# Patient Record
Sex: FEMALE | Race: WHITE | Employment: PART TIME | URBAN - NONMETROPOLITAN AREA
[De-identification: names, ages, dates, MRNs, and addresses within clinical notes are randomized per-mention and may not be internally consistent; named-entity substitution may affect disease eponyms.]

---

## 2018-02-20 ENCOUNTER — HOSPITAL ENCOUNTER (EMERGENCY)
Age: 28
Discharge: HOME | End: 2018-02-20
Attending: FAMILY MEDICINE
Payer: COMMERCIAL

## 2018-02-20 ENCOUNTER — APPOINTMENT (OUTPATIENT)
Dept: GENERAL RADIOLOGY | Age: 28
End: 2018-02-20
Payer: COMMERCIAL

## 2018-02-20 VITALS
OXYGEN SATURATION: 97 % | HEART RATE: 123 BPM | WEIGHT: 136 LBS | BODY MASS INDEX: 24.87 KG/M2 | SYSTOLIC BLOOD PRESSURE: 123 MMHG | DIASTOLIC BLOOD PRESSURE: 97 MMHG | TEMPERATURE: 99.9 F | RESPIRATION RATE: 16 BRPM

## 2018-02-20 DIAGNOSIS — R63.8 DEHYDRATION SYMPTOMS: ICD-10-CM

## 2018-02-20 DIAGNOSIS — J10.1 INFLUENZA A: Primary | ICD-10-CM

## 2018-02-20 LAB
ABSOLUTE EOS #: 0 K/UL (ref 0–0.4)
ABSOLUTE IMMATURE GRANULOCYTE: ABNORMAL K/UL (ref 0–0.3)
ABSOLUTE LYMPH #: 0.8 K/UL (ref 1–4.8)
ABSOLUTE MONO #: 0.4 K/UL (ref 0–1)
ANION GAP SERPL CALCULATED.3IONS-SCNC: 12 MMOL/L (ref 9–17)
BASOPHILS # BLD: 0 % (ref 0–2)
BASOPHILS ABSOLUTE: 0 K/UL (ref 0–0.2)
BUN BLDV-MCNC: 13 MG/DL (ref 6–20)
BUN/CREAT BLD: 21 (ref 9–20)
CALCIUM SERPL-MCNC: 9.4 MG/DL (ref 8.6–10.4)
CHLORIDE BLD-SCNC: 102 MMOL/L (ref 98–107)
CO2: 22 MMOL/L (ref 20–31)
CREAT SERPL-MCNC: 0.63 MG/DL (ref 0.5–0.9)
DIFFERENTIAL TYPE: ABNORMAL
DIRECT EXAM: ABNORMAL
DIRECT EXAM: NORMAL
EKG ATRIAL RATE: 108 BPM
EKG P AXIS: 48 DEGREES
EKG P-R INTERVAL: 140 MS
EKG Q-T INTERVAL: 320 MS
EKG QRS DURATION: 74 MS
EKG QTC CALCULATION (BAZETT): 428 MS
EKG R AXIS: 35 DEGREES
EKG T AXIS: 27 DEGREES
EKG VENTRICULAR RATE: 108 BPM
EOSINOPHILS RELATIVE PERCENT: 0 % (ref 0–8)
GFR AFRICAN AMERICAN: >60 ML/MIN
GFR NON-AFRICAN AMERICAN: >60 ML/MIN
GFR SERPL CREATININE-BSD FRML MDRD: ABNORMAL ML/MIN/{1.73_M2}
GFR SERPL CREATININE-BSD FRML MDRD: ABNORMAL ML/MIN/{1.73_M2}
GLUCOSE BLD-MCNC: 107 MG/DL (ref 70–99)
HCG QUANTITATIVE: <1 IU/L
HCT VFR BLD CALC: 44.3 % (ref 36–46)
HEMOGLOBIN: 15 G/DL (ref 12–16)
IMMATURE GRANULOCYTES: ABNORMAL %
LYMPHOCYTES # BLD: 13 % (ref 24–44)
Lab: ABNORMAL
Lab: ABNORMAL
Lab: NORMAL
MCH RBC QN AUTO: 30.1 PG (ref 26–34)
MCHC RBC AUTO-ENTMCNC: 33.9 G/DL (ref 31–37)
MCV RBC AUTO: 88.9 FL (ref 80–100)
MONOCYTES # BLD: 6 % (ref 0–12)
NRBC AUTOMATED: ABNORMAL PER 100 WBC
PDW BLD-RTO: 12.6 % (ref 12.1–15.2)
PLATELET # BLD: 210 K/UL (ref 140–450)
PLATELET ESTIMATE: ABNORMAL
PMV BLD AUTO: 8.9 FL (ref 6–12)
POTASSIUM SERPL-SCNC: 3.9 MMOL/L (ref 3.7–5.3)
RBC # BLD: 4.99 M/UL (ref 4–5.2)
RBC # BLD: ABNORMAL 10*6/UL
SEG NEUTROPHILS: 81 % (ref 36–66)
SEGMENTED NEUTROPHILS ABSOLUTE COUNT: 4.9 K/UL (ref 1.8–7.7)
SODIUM BLD-SCNC: 136 MMOL/L (ref 135–144)
SPECIMEN DESCRIPTION: ABNORMAL
SPECIMEN DESCRIPTION: NORMAL
STATUS: ABNORMAL
STATUS: ABNORMAL
STATUS: NORMAL
WBC # BLD: 6.1 K/UL (ref 3.5–11)
WBC # BLD: ABNORMAL 10*3/UL

## 2018-02-20 PROCEDURE — 6360000002 HC RX W HCPCS: Performed by: FAMILY MEDICINE

## 2018-02-20 PROCEDURE — 2580000003 HC RX 258: Performed by: FAMILY MEDICINE

## 2018-02-20 PROCEDURE — 71046 X-RAY EXAM CHEST 2 VIEWS: CPT

## 2018-02-20 PROCEDURE — 85025 COMPLETE CBC W/AUTO DIFF WBC: CPT

## 2018-02-20 PROCEDURE — 36415 COLL VENOUS BLD VENIPUNCTURE: CPT

## 2018-02-20 PROCEDURE — 99284 EMERGENCY DEPT VISIT MOD MDM: CPT

## 2018-02-20 PROCEDURE — 96374 THER/PROPH/DIAG INJ IV PUSH: CPT

## 2018-02-20 PROCEDURE — 84702 CHORIONIC GONADOTROPIN TEST: CPT

## 2018-02-20 PROCEDURE — 93005 ELECTROCARDIOGRAM TRACING: CPT

## 2018-02-20 PROCEDURE — 87804 INFLUENZA ASSAY W/OPTIC: CPT

## 2018-02-20 PROCEDURE — 80048 BASIC METABOLIC PNL TOTAL CA: CPT

## 2018-02-20 PROCEDURE — 87651 STREP A DNA AMP PROBE: CPT

## 2018-02-20 PROCEDURE — 6370000000 HC RX 637 (ALT 250 FOR IP): Performed by: FAMILY MEDICINE

## 2018-02-20 RX ORDER — OSELTAMIVIR PHOSPHATE 75 MG/1
75 CAPSULE ORAL 2 TIMES DAILY
Qty: 10 CAPSULE | Refills: 0 | Status: SHIPPED | OUTPATIENT
Start: 2018-02-20 | End: 2018-02-25

## 2018-02-20 RX ORDER — 0.9 % SODIUM CHLORIDE 0.9 %
1000 INTRAVENOUS SOLUTION INTRAVENOUS ONCE
Status: COMPLETED | OUTPATIENT
Start: 2018-02-20 | End: 2018-02-20

## 2018-02-20 RX ORDER — DEXTROMETHORPHAN HYDROBROMIDE AND PROMETHAZINE HYDROCHLORIDE 15; 6.25 MG/5ML; MG/5ML
5 SYRUP ORAL 4 TIMES DAILY PRN
Qty: 140 ML | Refills: 0 | Status: SHIPPED | OUTPATIENT
Start: 2018-02-20 | End: 2018-02-27

## 2018-02-20 RX ORDER — KETOROLAC TROMETHAMINE 30 MG/ML
30 INJECTION, SOLUTION INTRAMUSCULAR; INTRAVENOUS ONCE
Status: COMPLETED | OUTPATIENT
Start: 2018-02-20 | End: 2018-02-20

## 2018-02-20 RX ORDER — PROMETHAZINE HYDROCHLORIDE AND CODEINE PHOSPHATE 6.25; 1 MG/5ML; MG/5ML
5 SYRUP ORAL ONCE
Status: COMPLETED | OUTPATIENT
Start: 2018-02-20 | End: 2018-02-20

## 2018-02-20 RX ADMIN — KETOROLAC TROMETHAMINE 30 MG: 30 INJECTION, SOLUTION INTRAMUSCULAR at 08:32

## 2018-02-20 RX ADMIN — Medication 5 ML: at 08:23

## 2018-02-20 RX ADMIN — SODIUM CHLORIDE 1000 ML: 9 INJECTION, SOLUTION INTRAVENOUS at 08:31

## 2018-02-20 ASSESSMENT — PAIN SCALES - GENERAL
PAINLEVEL_OUTOF10: 10
PAINLEVEL_OUTOF10: 10

## 2018-02-20 ASSESSMENT — PAIN DESCRIPTION - LOCATION
LOCATION_2: THROAT
LOCATION: HEAD

## 2018-02-20 ASSESSMENT — PAIN DESCRIPTION - INTENSITY: RATING_2: 8

## 2018-02-20 ASSESSMENT — PAIN DESCRIPTION - DESCRIPTORS: DESCRIPTORS: ACHING

## 2018-02-20 ASSESSMENT — PAIN DESCRIPTION - PAIN TYPE: TYPE: ACUTE PAIN

## 2018-02-20 NOTE — ED PROVIDER NOTES
[facility]  eMERGENCY dEPARTMENT eNCOUnter          [facility]       Chief Complaint   Patient presents with    Cough     onset Friday    Pharyngitis     onset Friday    Headache     onset yesterday    Dizziness     onset this am       Nurses Notes reviewed and I agree except as noted in the HPI. HISTORY OF PRESENT ILLNESS    Elena Degroot is a 28 y.o. female who presents To emergency with complaint of cough sore throat headache bodyaches dizziness and fever chills. Patient states cough and sore throat symptoms began 3-4 days prior, headache began day prior, the patient had some dizziness this morning. Patient did not realize she had a fever quantitatively until she arrived emergent, but has had subjective fevers and chills. Patient has had no relief with Motrin 800 Tylenol,  at night all evening prior. 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 Patient rates her overall discomfort a 10 out of 10, aching. REVIEW OF SYSTEMS     Review of Systems   All other systems reviewed and are negative. PAST MEDICAL HISTORY    has a past medical history of Anemia; GERD (gastroesophageal reflux disease); Kidney stones; and Postpartum depression. SURGICAL HISTORY      has a past surgical history that includes External ear surgery; Upper gastrointestinal endoscopy; and other surgical history (Right, 2016). CURRENT MEDICATIONS       Discharge Medication List as of 2018  9:38 AM      CONTINUE these medications which have NOT CHANGED    Details   omeprazole (PRILOSEC) 20 MG capsule Take 20 mg by mouth Daily , R-0             ALLERGIES     has No Known Allergies. FAMILY HISTORY     indicated that her mother is alive. She indicated that her father is alive. She indicated that her sister is alive.  She indicated that her maternal grandmother is . She indicated that her maternal grandfather is alive. family history includes Arthritis in her maternal grandmother; Asthma in her sister; Heart Disease in her maternal grandfather; High Blood Pressure in her maternal grandfather; Stroke in her maternal grandfather; Ulcerative Colitis in her father and sister; Vision Loss in her maternal grandmother. SOCIAL HISTORY      reports that she has never smoked. She has never used smokeless tobacco. She reports that she does not drink alcohol or use drugs. PHYSICAL EXAM     INITIAL VITALS:  weight is 136 lb (61.7 kg). Her tympanic temperature is 99.9 °F (37.7 °C). Her blood pressure is 123/97 (abnormal) and her pulse is 123. Her respiration is 16 and oxygen saturation is 97%. Physical Exam   Constitutional: Patient is oriented to person, place, and time. Patient appears well-developed and well-nourished. Patient is active and cooperative. HENT:   Head: Normocephalic and atraumatic. Head is without contusion. Right Ear: Hearing and external ear normal. No drainage. Left Ear: Hearing and external ear normal. No drainage. Nose: Nose normal. No nasal deformity. No epistaxis. Mouth/Throat: Mucous membranes are  dry. Mild erythema negative exudate or oral lesions  Eyes: EOMI. Conjunctivae, sclera, and lids are normal. Right eye exhibits no discharge. Left eye exhibits no discharge. Neck: Full passive range of motion without pain and phonation normal.   Cardiovascular:  Tachycardia, regular rhythm and intact distal pulses. No edema. Pulses: Right radial pulse  2+   Pulmonary/Chest: Effort normal.  Mild tachypnea. No wheezes, rhonchi, or rales. Abdominal: Soft. Patient without distension or tenderness  Musculoskeletal:   Negative acute trauma or deformity,  apparent full range of motion and normal strength all extremities appropriate to age. Neurological: Patient is alert and oriented to person, place, and time. patient displays no tremor. Patient displays no seizure activity. .  Lymphatic: Mild anterior cervical adenopathy, nontender  Skin: Skin is warm and dry. Patient is not diaphoretic. Psychiatric: Patient has a normal mood and affect. Patient speech is normal and behavior is normal. Cognition and memory are normal.      DIFFERENTIAL DIAGNOSIS:   Influenza strep throat pneumonia bronchitis URI    DIAGNOSTIC RESULTS     EKG: All EKG's are interpreted by the Emergency Department Physician who either signs or Co-signs this chart in the absence of a cardiologist.  EKG    The patient had an EKG which is interpreted by me in the absence of a Cardiologist.   [x] Without comparison to previous. [] With comparison to a previous EKG Dated     EKG @ 0756 hrs - sinus tachycardia rate 108 normal axis normal intervals , no prior for comparison      RADIOLOGY: non-plain film images(s) such as CT, Ultrasound and MRI are read by the radiologist.  XR CHEST STANDARD (2 VW)   Final Result   Normal chest          LABS:   Labs Reviewed   RAPID INFLUENZA A/B ANTIGENS - Abnormal; Notable for the following:        Result Value    Direct Exam   (*)     Value: POSITIVE for Influenza A Antigen NEGATIVE for Influenza B Antigen    All other components within normal limits   CBC WITH AUTO DIFFERENTIAL - Abnormal; Notable for the following:     Seg Neutrophils 81 (*)     Lymphocytes 13 (*)     Absolute Lymph # 0.80 (*)     All other components within normal limits   BASIC METABOLIC PANEL - Abnormal; Notable for the following:     Glucose 107 (*)     Bun/Cre Ratio 21 (*)     All other components within normal limits   STREP A DNA PROBE, AMPLIFICATION - Abnormal; Notable for the following:     Direct Exam   (*)     Value: POSITIVE: Specimen positive for Streptococcus pyogenes by DNA amplification.     All other components within normal limits   STREP SCREEN GROUP A THROAT   HCG, QUANTITATIVE, PREGNANCY       EMERGENCY DEPARTMENT COURSE:   Vitals:    Vitals:    02/20/18 4968

## 2018-09-18 ENCOUNTER — HOSPITAL ENCOUNTER (OUTPATIENT)
Age: 28
Discharge: HOME | End: 2018-09-18
Payer: COMMERCIAL

## 2018-09-18 LAB
ESTRADIOL LEVEL: 47 PG/ML (ref 27–314)
HCG QUANTITATIVE: <1 IU/L
PROGESTERONE LEVEL: 4.9 NG/ML

## 2018-09-18 PROCEDURE — 82670 ASSAY OF TOTAL ESTRADIOL: CPT

## 2018-09-18 PROCEDURE — 84702 CHORIONIC GONADOTROPIN TEST: CPT

## 2018-09-18 PROCEDURE — 84144 ASSAY OF PROGESTERONE: CPT

## 2018-09-18 PROCEDURE — 36415 COLL VENOUS BLD VENIPUNCTURE: CPT

## 2018-09-25 ENCOUNTER — HOSPITAL ENCOUNTER (EMERGENCY)
Age: 28
Discharge: HOME | End: 2018-09-25
Attending: EMERGENCY MEDICINE
Payer: COMMERCIAL

## 2018-09-25 VITALS
DIASTOLIC BLOOD PRESSURE: 88 MMHG | TEMPERATURE: 98.4 F | HEART RATE: 84 BPM | SYSTOLIC BLOOD PRESSURE: 152 MMHG | BODY MASS INDEX: 26.7 KG/M2 | OXYGEN SATURATION: 97 % | WEIGHT: 146 LBS | RESPIRATION RATE: 16 BRPM

## 2018-09-25 DIAGNOSIS — S30.814A ABRASION OF VAGINA, INITIAL ENCOUNTER: Primary | ICD-10-CM

## 2018-09-25 PROCEDURE — 99282 EMERGENCY DEPT VISIT SF MDM: CPT

## 2018-09-25 RX ORDER — ESTRADIOL 0.5 MG/1
2 TABLET ORAL DAILY
Status: ON HOLD | COMMUNITY
End: 2019-06-24

## 2018-09-25 ASSESSMENT — PAIN SCALES - GENERAL: PAINLEVEL_OUTOF10: 9

## 2018-09-25 ASSESSMENT — ENCOUNTER SYMPTOMS
CONSTIPATION: 0
DIARRHEA: 0
SORE THROAT: 0
ABDOMINAL DISTENTION: 0
COUGH: 0
NAUSEA: 0
SHORTNESS OF BREATH: 0
RHINORRHEA: 0
VOMITING: 0
WHEEZING: 0

## 2018-09-25 ASSESSMENT — PAIN DESCRIPTION - PAIN TYPE: TYPE: ACUTE PAIN

## 2018-09-25 ASSESSMENT — PAIN DESCRIPTION - LOCATION: LOCATION: VAGINA

## 2018-09-25 ASSESSMENT — PAIN DESCRIPTION - DESCRIPTORS: DESCRIPTORS: SORE

## 2018-09-25 NOTE — ED PROVIDER NOTES
Pressure Maternal Grandfather     Arthritis Maternal Grandmother     Vision Loss Maternal Grandmother        Allergies:  Patient has no known allergies. Home Medications:  Prior to Admission medications    Medication Sig Start Date End Date Taking? Authorizing Provider   estradiol (ESTRACE) 0.5 MG tablet Take 0.5 mg by mouth daily   Yes Historical Provider, MD   omeprazole (PRILOSEC) 20 MG capsule Take 20 mg by mouth Daily  5/20/16  Yes Historical Provider, MD       REVIEW OF SYSTEMS    (2-9 systems for level 4, 10 or more for level 5)      Review of Systems   Constitutional: Negative for activity change, appetite change, fatigue and fever. HENT: Negative for congestion, rhinorrhea and sore throat. Respiratory: Negative for cough, shortness of breath and wheezing. Cardiovascular: Negative for chest pain, palpitations and leg swelling. Gastrointestinal: Negative for abdominal distention, constipation, diarrhea, nausea and vomiting. Genitourinary: Positive for vaginal pain. Negative for decreased urine volume and dysuria. Skin: Negative for rash. Neurological: Negative for dizziness, weakness, light-headedness, numbness and headaches. PHYSICAL EXAM   (up to 7 for level 4, 8 or more for level 5)      INITIAL VITALS:   BP (!) 152/88   Pulse 84   Temp 98.4 °F (36.9 °C) (Tympanic)   Resp 16   Wt 146 lb (66.2 kg)   LMP 09/21/2018   SpO2 97%   BMI 26.70 kg/m²     Physical Exam   Constitutional: She is oriented to person, place, and time. Nontoxic appearing, answering all questions appropriately no signs of distress   HENT:   Head: Normocephalic and atraumatic. Eyes: Conjunctivae are normal. Right eye exhibits no discharge. Left eye exhibits no discharge. Cardiovascular: Normal rate, regular rhythm and normal heart sounds. Exam reveals no gallop and no friction rub. No murmur heard. Pulmonary/Chest: Effort normal and breath sounds normal. No respiratory distress.  She has no DO  09/25/18 [facility] Francine, DO  09/25/18 1208

## 2018-09-25 NOTE — ED NOTES
Writer in with dr Macey Sheppard during vaginal exam. Pt tolerated well.        Angela Donald RN  09/25/18 7651

## 2018-10-23 ENCOUNTER — HOSPITAL ENCOUNTER (EMERGENCY)
Age: 28
Discharge: HOME | End: 2018-10-23
Attending: EMERGENCY MEDICINE
Payer: COMMERCIAL

## 2018-10-23 VITALS
SYSTOLIC BLOOD PRESSURE: 130 MMHG | HEART RATE: 103 BPM | RESPIRATION RATE: 18 BRPM | TEMPERATURE: 99.5 F | OXYGEN SATURATION: 99 % | DIASTOLIC BLOOD PRESSURE: 90 MMHG

## 2018-10-23 DIAGNOSIS — J06.9 UPPER RESPIRATORY TRACT INFECTION, UNSPECIFIED TYPE: Primary | ICD-10-CM

## 2018-10-23 DIAGNOSIS — J40 BRONCHITIS: ICD-10-CM

## 2018-10-23 PROCEDURE — 99283 EMERGENCY DEPT VISIT LOW MDM: CPT

## 2018-11-07 ENCOUNTER — HOSPITAL ENCOUNTER (OUTPATIENT)
Dept: ULTRASOUND IMAGING | Facility: CLINIC | Age: 28
Discharge: HOME | End: 2018-11-09
Payer: COMMERCIAL

## 2018-11-07 ENCOUNTER — HOSPITAL ENCOUNTER (OUTPATIENT)
Age: 28
Setting detail: SPECIMEN
Discharge: HOME | End: 2018-11-07
Payer: COMMERCIAL

## 2018-11-07 DIAGNOSIS — Z34.90 PREGNANCY, UNSPECIFIED GESTATIONAL AGE: ICD-10-CM

## 2018-11-07 LAB
ESTRADIOL LEVEL: 390 PG/ML (ref 27–314)
HCG QUANTITATIVE: ABNORMAL IU/L
PROGESTERONE LEVEL: 9.56 NG/ML

## 2018-11-07 PROCEDURE — 76817 TRANSVAGINAL US OBSTETRIC: CPT

## 2018-11-14 ENCOUNTER — HOSPITAL ENCOUNTER (OUTPATIENT)
Age: 28
Setting detail: SPECIMEN
Discharge: HOME | End: 2018-11-14
Payer: COMMERCIAL

## 2018-11-14 ENCOUNTER — HOSPITAL ENCOUNTER (OUTPATIENT)
Dept: ULTRASOUND IMAGING | Facility: CLINIC | Age: 28
Discharge: HOME | End: 2018-11-16
Payer: COMMERCIAL

## 2018-11-14 DIAGNOSIS — Z3A.01 LESS THAN 8 WEEKS GESTATION OF PREGNANCY: ICD-10-CM

## 2018-11-14 LAB
ESTRADIOL LEVEL: 637 PG/ML (ref 27–314)
PROGESTERONE LEVEL: 21.9 NG/ML

## 2018-11-14 PROCEDURE — 76817 TRANSVAGINAL US OBSTETRIC: CPT

## 2018-11-25 ENCOUNTER — HOSPITAL ENCOUNTER (EMERGENCY)
Age: 28
Discharge: HOME | End: 2018-11-25
Payer: COMMERCIAL

## 2018-11-25 VITALS
SYSTOLIC BLOOD PRESSURE: 114 MMHG | RESPIRATION RATE: 13 BRPM | TEMPERATURE: 98.4 F | HEART RATE: 80 BPM | DIASTOLIC BLOOD PRESSURE: 80 MMHG | OXYGEN SATURATION: 97 %

## 2018-11-25 DIAGNOSIS — N30.00 ACUTE CYSTITIS WITHOUT HEMATURIA: ICD-10-CM

## 2018-11-25 DIAGNOSIS — O21.9 NAUSEA/VOMITING IN PREGNANCY: ICD-10-CM

## 2018-11-25 DIAGNOSIS — K21.9 GASTROESOPHAGEAL REFLUX DISEASE, ESOPHAGITIS PRESENCE NOT SPECIFIED: Primary | ICD-10-CM

## 2018-11-25 LAB
-: ABNORMAL
ABSOLUTE EOS #: 0.13 K/UL (ref 0–0.44)
ABSOLUTE IMMATURE GRANULOCYTE: 0.03 K/UL (ref 0–0.3)
ABSOLUTE LYMPH #: 2.57 K/UL (ref 1.1–3.7)
ABSOLUTE MONO #: 0.58 K/UL (ref 0.1–1.2)
ALBUMIN SERPL-MCNC: 3.7 G/DL (ref 3.5–5.2)
ALBUMIN/GLOBULIN RATIO: 1.2 (ref 1–2.5)
ALP BLD-CCNC: 73 U/L (ref 35–104)
ALT SERPL-CCNC: 12 U/L (ref 5–33)
AMORPHOUS: ABNORMAL
ANION GAP SERPL CALCULATED.3IONS-SCNC: 11 MMOL/L (ref 9–17)
AST SERPL-CCNC: 15 U/L
BACTERIA: ABNORMAL
BASOPHILS # BLD: 1 % (ref 0–2)
BASOPHILS ABSOLUTE: 0.06 K/UL (ref 0–0.2)
BILIRUB SERPL-MCNC: 0.22 MG/DL (ref 0.3–1.2)
BILIRUBIN URINE: NEGATIVE
BUN BLDV-MCNC: 8 MG/DL (ref 6–20)
BUN/CREAT BLD: 15 (ref 9–20)
CALCIUM SERPL-MCNC: 9 MG/DL (ref 8.6–10.4)
CASTS UA: ABNORMAL /LPF
CHLORIDE BLD-SCNC: 103 MMOL/L (ref 98–107)
CO2: 24 MMOL/L (ref 20–31)
COLOR: YELLOW
COMMENT UA: ABNORMAL
CREAT SERPL-MCNC: 0.54 MG/DL (ref 0.5–0.9)
CRYSTALS, UA: ABNORMAL /HPF
DIFFERENTIAL TYPE: ABNORMAL
EOSINOPHILS RELATIVE PERCENT: 1 % (ref 1–4)
EPITHELIAL CELLS UA: ABNORMAL /HPF (ref 0–25)
GFR AFRICAN AMERICAN: >60 ML/MIN
GFR NON-AFRICAN AMERICAN: >60 ML/MIN
GFR SERPL CREATININE-BSD FRML MDRD: ABNORMAL ML/MIN/{1.73_M2}
GFR SERPL CREATININE-BSD FRML MDRD: ABNORMAL ML/MIN/{1.73_M2}
GLUCOSE BLD-MCNC: 86 MG/DL (ref 70–99)
GLUCOSE URINE: NEGATIVE
HCT VFR BLD CALC: 41.7 % (ref 36.3–47.1)
HEMOGLOBIN: 14.3 G/DL (ref 11.9–15.1)
IMMATURE GRANULOCYTES: 0 %
KETONES, URINE: NEGATIVE
LEUKOCYTE ESTERASE, URINE: ABNORMAL
LIPASE: 22 U/L (ref 13–60)
LYMPHOCYTES # BLD: 26 % (ref 24–43)
MCH RBC QN AUTO: 31.6 PG (ref 25.2–33.5)
MCHC RBC AUTO-ENTMCNC: 34.3 G/DL (ref 28.4–34.8)
MCV RBC AUTO: 92.1 FL (ref 82.6–102.9)
MONOCYTES # BLD: 6 % (ref 3–12)
MUCUS: ABNORMAL
NITRITE, URINE: NEGATIVE
NRBC AUTOMATED: 0 PER 100 WBC
OTHER OBSERVATIONS UA: ABNORMAL
PDW BLD-RTO: 11.5 % (ref 11.8–14.4)
PH UA: 6 (ref 5–9)
PLATELET # BLD: 241 K/UL (ref 138–453)
PLATELET ESTIMATE: ABNORMAL
PMV BLD AUTO: 10.2 FL (ref 8.1–13.5)
POTASSIUM SERPL-SCNC: 4 MMOL/L (ref 3.7–5.3)
PROTEIN UA: NEGATIVE
RBC # BLD: 4.53 M/UL (ref 3.95–5.11)
RBC # BLD: ABNORMAL 10*6/UL
RBC UA: ABNORMAL /HPF (ref 0–2)
RENAL EPITHELIAL, UA: ABNORMAL /HPF
SEG NEUTROPHILS: 66 % (ref 36–65)
SEGMENTED NEUTROPHILS ABSOLUTE COUNT: 6.45 K/UL (ref 1.5–8.1)
SODIUM BLD-SCNC: 138 MMOL/L (ref 135–144)
SPECIFIC GRAVITY UA: 1.02 (ref 1.01–1.02)
TOTAL PROTEIN: 6.8 G/DL (ref 6.4–8.3)
TRICHOMONAS: ABNORMAL
TURBIDITY: CLEAR
URINE HGB: NEGATIVE
UROBILINOGEN, URINE: NORMAL
WBC # BLD: 9.8 K/UL (ref 3.5–11.3)
WBC # BLD: ABNORMAL 10*3/UL
WBC UA: ABNORMAL /HPF (ref 0–5)
YEAST: ABNORMAL

## 2018-11-25 PROCEDURE — 85025 COMPLETE CBC W/AUTO DIFF WBC: CPT

## 2018-11-25 PROCEDURE — 96375 TX/PRO/DX INJ NEW DRUG ADDON: CPT

## 2018-11-25 PROCEDURE — 6360000002 HC RX W HCPCS: Performed by: PHYSICIAN ASSISTANT

## 2018-11-25 PROCEDURE — 2580000003 HC RX 258: Performed by: PHYSICIAN ASSISTANT

## 2018-11-25 PROCEDURE — 81001 URINALYSIS AUTO W/SCOPE: CPT

## 2018-11-25 PROCEDURE — 96365 THER/PROPH/DIAG IV INF INIT: CPT

## 2018-11-25 PROCEDURE — 36415 COLL VENOUS BLD VENIPUNCTURE: CPT

## 2018-11-25 PROCEDURE — 87086 URINE CULTURE/COLONY COUNT: CPT

## 2018-11-25 PROCEDURE — 80053 COMPREHEN METABOLIC PANEL: CPT

## 2018-11-25 PROCEDURE — 99284 EMERGENCY DEPT VISIT MOD MDM: CPT

## 2018-11-25 PROCEDURE — 83690 ASSAY OF LIPASE: CPT

## 2018-11-25 RX ORDER — METOCLOPRAMIDE HYDROCHLORIDE 5 MG/ML
10 INJECTION INTRAMUSCULAR; INTRAVENOUS ONCE
Status: DISCONTINUED | OUTPATIENT
Start: 2018-11-25 | End: 2018-11-25

## 2018-11-25 RX ORDER — CEPHALEXIN 500 MG/1
500 CAPSULE ORAL 4 TIMES DAILY
Qty: 28 CAPSULE | Refills: 0 | Status: SHIPPED | OUTPATIENT
Start: 2018-11-25 | End: 2018-12-02

## 2018-11-25 RX ORDER — METOCLOPRAMIDE HYDROCHLORIDE 5 MG/ML
10 INJECTION INTRAMUSCULAR; INTRAVENOUS ONCE
Status: COMPLETED | OUTPATIENT
Start: 2018-11-25 | End: 2018-11-25

## 2018-11-25 RX ORDER — 0.9 % SODIUM CHLORIDE 0.9 %
1000 INTRAVENOUS SOLUTION INTRAVENOUS ONCE
Status: DISCONTINUED | OUTPATIENT
Start: 2018-11-25 | End: 2018-11-25

## 2018-11-25 RX ORDER — OMEPRAZOLE 20 MG/1
20 CAPSULE, DELAYED RELEASE ORAL DAILY
Qty: 30 CAPSULE | Refills: 0 | Status: SHIPPED | OUTPATIENT
Start: 2018-11-25 | End: 2020-09-30 | Stop reason: ALTCHOICE

## 2018-11-25 RX ORDER — DOXYLAMINE SUCCINATE AND PYRIDOXINE HYDROCHLORIDE, DELAYED RELEASE TABLETS 10 MG/10 MG 10; 10 MG/1; MG/1
TABLET, DELAYED RELEASE ORAL
Qty: 20 TABLET | Refills: 0 | Status: ON HOLD | OUTPATIENT
Start: 2018-11-25 | End: 2019-06-16

## 2018-11-25 RX ADMIN — SODIUM CHLORIDE 999 ML: 9 INJECTION, SOLUTION INTRAVENOUS at 17:09

## 2018-11-25 RX ADMIN — METOCLOPRAMIDE 10 MG: 5 INJECTION, SOLUTION INTRAMUSCULAR; INTRAVENOUS at 17:16

## 2018-11-25 ASSESSMENT — ENCOUNTER SYMPTOMS
VOMITING: 1
SORE THROAT: 1
ABDOMINAL PAIN: 0
NAUSEA: 1

## 2018-11-25 ASSESSMENT — PAIN DESCRIPTION - PAIN TYPE: TYPE: ACUTE PAIN

## 2018-11-25 ASSESSMENT — PAIN SCALES - GENERAL: PAINLEVEL_OUTOF10: 3

## 2018-11-25 ASSESSMENT — PAIN DESCRIPTION - LOCATION: LOCATION: THROAT

## 2018-11-26 LAB
CULTURE: NORMAL
Lab: NORMAL
SPECIMEN DESCRIPTION: NORMAL
STATUS: NORMAL

## 2018-11-29 ENCOUNTER — APPOINTMENT (OUTPATIENT)
Dept: GENERAL RADIOLOGY | Age: 28
End: 2018-11-29
Payer: COMMERCIAL

## 2018-11-29 ENCOUNTER — HOSPITAL ENCOUNTER (EMERGENCY)
Age: 28
Discharge: HOME | End: 2018-11-29
Attending: EMERGENCY MEDICINE
Payer: COMMERCIAL

## 2018-11-29 VITALS
OXYGEN SATURATION: 99 % | RESPIRATION RATE: 18 BRPM | HEIGHT: 63 IN | BODY MASS INDEX: 25.87 KG/M2 | TEMPERATURE: 98.6 F | DIASTOLIC BLOOD PRESSURE: 106 MMHG | WEIGHT: 146 LBS | SYSTOLIC BLOOD PRESSURE: 135 MMHG | HEART RATE: 83 BPM

## 2018-11-29 DIAGNOSIS — J02.9 SORE THROAT: Primary | ICD-10-CM

## 2018-11-29 DIAGNOSIS — K21.00 GASTROESOPHAGEAL REFLUX DISEASE WITH ESOPHAGITIS: ICD-10-CM

## 2018-11-29 PROCEDURE — 99283 EMERGENCY DEPT VISIT LOW MDM: CPT

## 2018-11-29 PROCEDURE — 6370000000 HC RX 637 (ALT 250 FOR IP): Performed by: EMERGENCY MEDICINE

## 2018-11-29 PROCEDURE — 70360 X-RAY EXAM OF NECK: CPT

## 2018-11-29 RX ORDER — MAGNESIUM HYDROXIDE/ALUMINUM HYDROXICE/SIMETHICONE 120; 1200; 1200 MG/30ML; MG/30ML; MG/30ML
30 SUSPENSION ORAL ONCE
Status: COMPLETED | OUTPATIENT
Start: 2018-11-29 | End: 2018-11-29

## 2018-11-29 RX ORDER — PROMETHAZINE HYDROCHLORIDE 12.5 MG/1
12.5 TABLET ORAL 3 TIMES DAILY PRN
Qty: 12 TABLET | Refills: 0 | Status: SHIPPED | OUTPATIENT
Start: 2018-11-29 | End: 2018-12-06

## 2018-11-29 RX ADMIN — ALUMINUM HYDROXIDE, MAGNESIUM HYDROXIDE, AND SIMETHICONE 30 ML: 200; 200; 20 SUSPENSION ORAL at 18:21

## 2018-11-29 NOTE — ED NOTES
Patient requesting xray of neck to make sure there isn't something in her throat     Yancy Joaquin RN  11/29/18 6814

## 2018-11-29 NOTE — ED PROVIDER NOTES
History:   Procedure Laterality Date    EXTERNAL EAR SURGERY      OTHER SURGICAL HISTORY Right 5/26/2016    right cysto HLL with stent exchange    UPPER GASTROINTESTINAL ENDOSCOPY         CURRENT MEDICATIONS       Previous Medications    CEPHALEXIN (KEFLEX) 500 MG CAPSULE    Take 1 capsule by mouth 4 times daily for 7 days    DOXYLAMINE-PYRIDOXINE 10-10 MG TBEC    Take 2 tablets PO at bedtime for Nausea    ESTRADIOL (ESTRACE) 0.5 MG TABLET    Take 2 mg by mouth daily     OMEPRAZOLE (PRILOSEC) 20 MG DELAYED RELEASE CAPSULE    Take 1 capsule by mouth daily    PROGESTERONE (FIRST-PROGESTERONE ) 200 MG SUPP    Place 200 mg vaginally daily    PROGESTERONE (PROMETRIUM) 200 MG CAPSULE    Take 200 mg by mouth daily       ALLERGIES     Patient has no known allergies.     FAMILY HISTORY       Family History   Problem Relation Age of Onset    Ulcerative Colitis Father     Ulcerative Colitis Sister     Asthma Sister     Heart Disease Maternal Grandfather     Stroke Maternal Grandfather     High Blood Pressure Maternal Grandfather     Arthritis Maternal Grandmother     Vision Loss Maternal Grandmother         SOCIAL HISTORY       Social History     Social History    Marital status: Single     Spouse name: N/A    Number of children: N/A    Years of education: N/A     Social History Main Topics    Smoking status: Never Smoker    Smokeless tobacco: Never Used    Alcohol use No    Drug use: No    Sexual activity: Yes     Partners: Male     Other Topics Concern    None     Social History Narrative    None       SCREENINGS           PHYSICAL EXAM    (up to 7 for level 4, 8 or more for level 5)     ED Triage Vitals [11/29/18 1726]   BP Temp Temp Source Pulse Resp SpO2 Height Weight   (!) 135/106 98.6 °F (37 °C) Tympanic 83 18 99 % 5' 3\" (1.6 m) 146 lb (66.2 kg)     ED Triage Vitals [11/29/18 1726]   Enc Vitals Group      BP (!) 135/106      Pulse 83      Resp 18      Temp 98.6 °F (37 °C)      Temp Source that she is eating and drinking and doing fine  [BD]   1834 Requested radiology to shield the abdomen    [BD]      ED Course User Index  [BD] Syanns El, DO     Medications   aluminum & magnesium hydroxide-simethicone (MAALOX) 425-188-17 MG/5ML suspension 30 mL (30 mLs Oral Given 11/29/18 1821)       MDM. Patient was counseled to follow up with primary care doctor in one to two days or return to emergency Department if patient has new or worsening symptoms or other concerns. I was able to address the patient's questions, pain and other concerns to their satisfaction. The patient and/or family   -had the results of all tests and diagnosis explained to them   -were given both verbal and written discharge instructions   -were instructed of the importance of close follow-up   -were told that an ED diagnosis is often a preliminary diagnosis   -that definitive care is often not able to be given in the ED   -were told that close follow-up is essential for good health and good outcomes         REVAL:     As suspected x-ray negative unremarkable visit no findings of medical emergency advised close follow-up    Patient states that she is already on Prilosec    I advised using Maalox with it    I estimate there is LOW risk for (including but not limited to) ACUTE CORONARY SYNDROME, PULMONARY EMBOLISM, PNEUMOTHORAX, RUPTURED ESOPHAGUS OR THORACIC AORTIC DISSECTION, thus I consider the discharge disposition reasonable. Emeterio Dave (or their surrogate) and I have discussed the diagnosis and risks, and we agree with discharging home with close follow-up. We also discussed returning to the Emergency Department immediately if new or worsening symptoms occur. We have discussed the symptoms which are most concerning that necessitate immediate return.         I estimate there is LOW risk for (including but not limited to) NON-ACCIDENTAL TRAUMA, UNSTABLE CERVICAL FRACTURE, ACUTE CORONARY SYNDROME, AORTIC DISSECTION, CENTRAL CORD SYNDROME, / ANTERIOR/ POSTERIOR CORD SYNDROME, EPIDURAL MASS LESION or BACTERIAL MENINGITIS thus I consider the discharge disposition reasonable. Ania Show (or their surrogate) and I have discussed the diagnosis and risks, and we agree with discharging home with close follow-up. We also discussed returning to the Emergency Department immediately if new or worsening symptoms occur. We have discussed the symptoms which are most concerning that necessitate immediate return. CRITICAL CARE TIME       CONSULTS:  None    PROCEDURES:  Unless otherwise noted below, none     Procedures    ATTESTATIONS  Vital signs and nursing notes reviewed. If included as part of this work-up, I interpreted the ECG andreviewed all diagnostic imaging as well as provided preliminary interpretation of plain film imaging as noted. As warranted and when indicated,  I reviewed the PDMP as  applicable. FINAL IMPRESSION      1. Sore throat    2. Gastroesophageal reflux disease with esophagitis          DISPOSITION/PLAN   DISPOSITION        PATIENT REFERREDTO:  [place] [address]  [phone]  Schedule an appointment as soon as possible for a visit in 1 day  For reevaluation of today's complaint      DISCHARGE MEDICATIONS:  New Prescriptions    No medications on file                Summation      Patient Course: See HPI and MDM       ED Medications administered this visit:    Medications   aluminum & magnesium hydroxide-simethicone (MAALOX) 200-200-20 MG/5ML suspension 30 mL (30 mLs Oral Given 11/29/18 1821)       New Prescriptions from this visit:  New Prescriptions    No medications on file       Follow-up:  [address] [facility]  Schedule an appointment as soon as possible for a visit in 1 day  For reevaluation of today's complaint        Final Impression:   1. Sore throat    2.  Gastroesophageal

## 2018-11-29 NOTE — ED NOTES
Patient states that she is pregnant and has been vomiting a lot, states that she has something stuck in her throat. Says she is unable to eat or drink anything that she will vomit it right back up.      Soniya Ledezma RN  11/29/18 1941

## 2018-12-03 ENCOUNTER — HOSPITAL ENCOUNTER (OUTPATIENT)
Age: 28
Setting detail: SPECIMEN
Discharge: HOME | End: 2018-12-03
Payer: COMMERCIAL

## 2018-12-03 ENCOUNTER — HOSPITAL ENCOUNTER (OUTPATIENT)
Dept: ULTRASOUND IMAGING | Facility: CLINIC | Age: 28
Discharge: HOME | End: 2018-12-05
Payer: COMMERCIAL

## 2018-12-03 DIAGNOSIS — Z34.90 PREGNANCY, UNSPECIFIED GESTATIONAL AGE: ICD-10-CM

## 2018-12-03 LAB
ESTRADIOL LEVEL: 1912 PG/ML (ref 27–314)
PROGESTERONE LEVEL: 21.33 NG/ML

## 2018-12-03 PROCEDURE — 76801 OB US < 14 WKS SINGLE FETUS: CPT

## 2018-12-03 PROCEDURE — 76817 TRANSVAGINAL US OBSTETRIC: CPT

## 2018-12-14 ENCOUNTER — HOSPITAL ENCOUNTER (OUTPATIENT)
Dept: ULTRASOUND IMAGING | Age: 28
Discharge: HOME | End: 2018-12-16
Payer: COMMERCIAL

## 2018-12-14 DIAGNOSIS — O20.9 VAGINAL BLEEDING AFFECTING EARLY PREGNANCY: ICD-10-CM

## 2018-12-14 PROCEDURE — 76817 TRANSVAGINAL US OBSTETRIC: CPT

## 2018-12-26 ENCOUNTER — HOSPITAL ENCOUNTER (OUTPATIENT)
Age: 28
Discharge: HOME | End: 2018-12-26
Payer: COMMERCIAL

## 2018-12-26 LAB
ABO/RH: NORMAL
ABSOLUTE EOS #: 0.04 K/UL (ref 0–0.44)
ABSOLUTE IMMATURE GRANULOCYTE: <0.03 K/UL (ref 0–0.3)
ABSOLUTE LYMPH #: 2 K/UL (ref 1.1–3.7)
ABSOLUTE MONO #: 0.39 K/UL (ref 0.1–1.2)
ANTIBODY SCREEN: NEGATIVE
BASOPHILS # BLD: 0 % (ref 0–2)
BASOPHILS ABSOLUTE: <0.03 K/UL (ref 0–0.2)
DIFFERENTIAL TYPE: ABNORMAL
EOSINOPHILS RELATIVE PERCENT: 1 % (ref 1–4)
HCT VFR BLD CALC: 41.5 % (ref 36.3–47.1)
HEMOGLOBIN: 13.7 G/DL (ref 11.9–15.1)
HEPATITIS B SURFACE ANTIGEN: NONREACTIVE
IMMATURE GRANULOCYTES: 0 %
LYMPHOCYTES # BLD: 28 % (ref 24–43)
MCH RBC QN AUTO: 30.5 PG (ref 25.2–33.5)
MCHC RBC AUTO-ENTMCNC: 33 G/DL (ref 28.4–34.8)
MCV RBC AUTO: 92.4 FL (ref 82.6–102.9)
MONOCYTES # BLD: 5 % (ref 3–12)
NRBC AUTOMATED: 0 PER 100 WBC
PDW BLD-RTO: 11.8 % (ref 11.8–14.4)
PLATELET # BLD: 237 K/UL (ref 138–453)
PLATELET ESTIMATE: ABNORMAL
PMV BLD AUTO: 10.4 FL (ref 8.1–13.5)
RBC # BLD: 4.49 M/UL (ref 3.95–5.11)
RBC # BLD: ABNORMAL 10*6/UL
RUBV IGG SER QL: 108.8 IU/ML
SEG NEUTROPHILS: 66 % (ref 36–65)
SEGMENTED NEUTROPHILS ABSOLUTE COUNT: 4.74 K/UL (ref 1.5–8.1)
T. PALLIDUM, IGG: NONREACTIVE
THYROXINE, FREE: 1.27 NG/DL (ref 0.93–1.7)
TSH SERPL DL<=0.05 MIU/L-ACNC: 0.02 MIU/L (ref 0.3–5)
WBC # BLD: 7.2 K/UL (ref 3.5–11.3)
WBC # BLD: ABNORMAL 10*3/UL

## 2018-12-26 PROCEDURE — 84443 ASSAY THYROID STIM HORMONE: CPT

## 2018-12-26 PROCEDURE — 85025 COMPLETE CBC W/AUTO DIFF WBC: CPT

## 2018-12-26 PROCEDURE — 86900 BLOOD TYPING SEROLOGIC ABO: CPT

## 2018-12-26 PROCEDURE — 84439 ASSAY OF FREE THYROXINE: CPT

## 2018-12-26 PROCEDURE — 87340 HEPATITIS B SURFACE AG IA: CPT

## 2018-12-26 PROCEDURE — 86850 RBC ANTIBODY SCREEN: CPT

## 2018-12-26 PROCEDURE — 36415 COLL VENOUS BLD VENIPUNCTURE: CPT

## 2018-12-26 PROCEDURE — 86780 TREPONEMA PALLIDUM: CPT

## 2018-12-26 PROCEDURE — 86901 BLOOD TYPING SEROLOGIC RH(D): CPT

## 2018-12-26 PROCEDURE — 86762 RUBELLA ANTIBODY: CPT

## 2019-01-11 LAB
ABO, EXTERNAL RESULT: NORMAL
C. TRACHOMATIS, EXTERNAL RESULT: NORMAL
HEP B, EXTERNAL RESULT: NORMAL
HIV, EXTERNAL RESULT: NORMAL
RHOGAM, EXTERNAL RESULT: NORMAL
RPR, EXTERNAL RESULT: NORMAL
RUBELLA TITER, EXTERNAL RESULT: NORMAL

## 2019-02-14 ENCOUNTER — HOSPITAL ENCOUNTER (OUTPATIENT)
Age: 29
Discharge: HOME | End: 2019-02-14
Attending: OBSTETRICS & GYNECOLOGY | Admitting: OBSTETRICS & GYNECOLOGY
Payer: COMMERCIAL

## 2019-02-14 ENCOUNTER — APPOINTMENT (OUTPATIENT)
Dept: ULTRASOUND IMAGING | Age: 29
End: 2019-02-14
Payer: COMMERCIAL

## 2019-02-14 LAB
-: ABNORMAL
ABSOLUTE EOS #: 0.06 K/UL (ref 0–0.44)
ABSOLUTE IMMATURE GRANULOCYTE: 0.06 K/UL (ref 0–0.3)
ABSOLUTE LYMPH #: 2.26 K/UL (ref 1.1–3.7)
ABSOLUTE MONO #: 0.63 K/UL (ref 0.1–1.2)
AMORPHOUS: ABNORMAL
BACTERIA: ABNORMAL
BASOPHILS # BLD: 0 % (ref 0–2)
BASOPHILS ABSOLUTE: <0.03 K/UL (ref 0–0.2)
BILIRUBIN URINE: NEGATIVE
CASTS UA: ABNORMAL /LPF
COLOR: YELLOW
COMMENT UA: ABNORMAL
CRYSTALS, UA: ABNORMAL /HPF
DIFFERENTIAL TYPE: ABNORMAL
EOSINOPHILS RELATIVE PERCENT: 1 % (ref 1–4)
EPITHELIAL CELLS UA: ABNORMAL /HPF (ref 0–25)
GLUCOSE URINE: NEGATIVE
HCT VFR BLD CALC: 35.8 % (ref 36.3–47.1)
HEMOGLOBIN: 12.1 G/DL (ref 11.9–15.1)
IMMATURE GRANULOCYTES: 1 %
KETONES, URINE: NEGATIVE
LEUKOCYTE ESTERASE, URINE: ABNORMAL
LYMPHOCYTES # BLD: 22 % (ref 24–43)
MCH RBC QN AUTO: 31.2 PG (ref 25.2–33.5)
MCHC RBC AUTO-ENTMCNC: 33.8 G/DL (ref 28.4–34.8)
MCV RBC AUTO: 92.3 FL (ref 82.6–102.9)
MONOCYTES # BLD: 6 % (ref 3–12)
MUCUS: ABNORMAL
NITRITE, URINE: NEGATIVE
NRBC AUTOMATED: 0 PER 100 WBC
OTHER OBSERVATIONS UA: ABNORMAL
PDW BLD-RTO: 13.2 % (ref 11.8–14.4)
PH UA: 7.5 (ref 5–9)
PLATELET # BLD: 207 K/UL (ref 138–453)
PLATELET ESTIMATE: ABNORMAL
PMV BLD AUTO: 10.1 FL (ref 8.1–13.5)
PROTEIN UA: NEGATIVE
RBC # BLD: 3.88 M/UL (ref 3.95–5.11)
RBC # BLD: ABNORMAL 10*6/UL
RBC UA: ABNORMAL /HPF (ref 0–2)
RENAL EPITHELIAL, UA: ABNORMAL /HPF
SEG NEUTROPHILS: 70 % (ref 36–65)
SEGMENTED NEUTROPHILS ABSOLUTE COUNT: 7.37 K/UL (ref 1.5–8.1)
SPECIFIC GRAVITY UA: 1.01 (ref 1.01–1.02)
TRICHOMONAS: ABNORMAL
TURBIDITY: ABNORMAL
URINE HGB: NEGATIVE
UROBILINOGEN, URINE: NORMAL
WBC # BLD: 10.4 K/UL (ref 3.5–11.3)
WBC # BLD: ABNORMAL 10*3/UL
WBC UA: ABNORMAL /HPF (ref 0–5)
YEAST: ABNORMAL

## 2019-02-14 PROCEDURE — 85025 COMPLETE CBC W/AUTO DIFF WBC: CPT

## 2019-02-14 PROCEDURE — 36415 COLL VENOUS BLD VENIPUNCTURE: CPT

## 2019-02-14 PROCEDURE — 81001 URINALYSIS AUTO W/SCOPE: CPT

## 2019-02-14 PROCEDURE — 76817 TRANSVAGINAL US OBSTETRIC: CPT

## 2019-02-14 PROCEDURE — 99212 OFFICE O/P EST SF 10 MIN: CPT

## 2019-02-25 ENCOUNTER — HOSPITAL ENCOUNTER (OUTPATIENT)
Dept: ULTRASOUND IMAGING | Age: 29
Discharge: HOME | End: 2019-02-27
Payer: COMMERCIAL

## 2019-02-25 DIAGNOSIS — Z36.3 ENCOUNTER FOR ANTENATAL SCREENING FOR MALFORMATION USING ULTRASOUND: ICD-10-CM

## 2019-02-25 PROCEDURE — 76805 OB US >/= 14 WKS SNGL FETUS: CPT

## 2019-03-29 ENCOUNTER — HOSPITAL ENCOUNTER (OUTPATIENT)
Dept: ULTRASOUND IMAGING | Age: 29
Discharge: HOME | End: 2019-03-31
Payer: COMMERCIAL

## 2019-03-29 DIAGNOSIS — O36.63X0 EXCESSIVE FETAL GROWTH AFFECTING MANAGEMENT OF PREGNANCY IN THIRD TRIMESTER, SINGLE OR UNSPECIFIED FETUS: ICD-10-CM

## 2019-03-29 PROCEDURE — 76805 OB US >/= 14 WKS SNGL FETUS: CPT

## 2019-04-19 ENCOUNTER — HOSPITAL ENCOUNTER (OUTPATIENT)
Dept: ULTRASOUND IMAGING | Age: 29
Discharge: HOME | End: 2019-04-21
Payer: COMMERCIAL

## 2019-04-19 DIAGNOSIS — O40.3XX0 POLYHYDRAMNIOS IN THIRD TRIMESTER COMPLICATION, SINGLE OR UNSPECIFIED FETUS: ICD-10-CM

## 2019-04-19 PROCEDURE — 76805 OB US >/= 14 WKS SNGL FETUS: CPT

## 2019-05-25 ENCOUNTER — HOSPITAL ENCOUNTER (OUTPATIENT)
Age: 29
Discharge: HOME | End: 2019-05-26
Attending: OBSTETRICS & GYNECOLOGY | Admitting: OBSTETRICS & GYNECOLOGY
Payer: COMMERCIAL

## 2019-05-25 PROBLEM — O47.00 PRETERM CONTRACTIONS: Status: ACTIVE | Noted: 2019-05-25

## 2019-05-25 LAB
-: ABNORMAL
ABO/RH: NORMAL
ABSOLUTE EOS #: 0.04 K/UL (ref 0–0.44)
ABSOLUTE IMMATURE GRANULOCYTE: 0.21 K/UL (ref 0–0.3)
ABSOLUTE LYMPH #: 1.99 K/UL (ref 1.1–3.7)
ABSOLUTE MONO #: 0.9 K/UL (ref 0.1–1.2)
AMORPHOUS: ABNORMAL
ANTIBODY SCREEN: NEGATIVE
ARM BAND NUMBER: NORMAL
BACTERIA: ABNORMAL
BASOPHILS # BLD: 0 % (ref 0–2)
BASOPHILS ABSOLUTE: 0.03 K/UL (ref 0–0.2)
BILIRUBIN URINE: NEGATIVE
CASTS UA: ABNORMAL /LPF
COLOR: YELLOW
COMMENT UA: ABNORMAL
CRYSTALS, UA: ABNORMAL /HPF
DIFFERENTIAL TYPE: ABNORMAL
EOSINOPHILS RELATIVE PERCENT: 0 % (ref 1–4)
EPITHELIAL CELLS UA: ABNORMAL /HPF (ref 0–25)
EXPIRATION DATE: NORMAL
GLUCOSE URINE: NEGATIVE
HCT VFR BLD CALC: 33.2 % (ref 36.3–47.1)
HEMOGLOBIN: 10.2 G/DL (ref 11.9–15.1)
IMMATURE GRANULOCYTES: 2 %
KETONES, URINE: ABNORMAL
LEUKOCYTE ESTERASE, URINE: ABNORMAL
LYMPHOCYTES # BLD: 16 % (ref 24–43)
MCH RBC QN AUTO: 27.1 PG (ref 25.2–33.5)
MCHC RBC AUTO-ENTMCNC: 30.7 G/DL (ref 28.4–34.8)
MCV RBC AUTO: 88.1 FL (ref 82.6–102.9)
MONOCYTES # BLD: 7 % (ref 3–12)
MUCUS: ABNORMAL
NITRITE, URINE: NEGATIVE
NRBC AUTOMATED: 0.2 PER 100 WBC
OTHER OBSERVATIONS UA: ABNORMAL
PDW BLD-RTO: 14.3 % (ref 11.8–14.4)
PH UA: 7 (ref 5–9)
PLATELET # BLD: 173 K/UL (ref 138–453)
PLATELET ESTIMATE: ABNORMAL
PMV BLD AUTO: 11.2 FL (ref 8.1–13.5)
PROTEIN UA: NEGATIVE
RBC # BLD: 3.77 M/UL (ref 3.95–5.11)
RBC # BLD: ABNORMAL 10*6/UL
RBC UA: ABNORMAL /HPF (ref 0–2)
RENAL EPITHELIAL, UA: ABNORMAL /HPF
SEG NEUTROPHILS: 75 % (ref 36–65)
SEGMENTED NEUTROPHILS ABSOLUTE COUNT: 9.52 K/UL (ref 1.5–8.1)
SPECIFIC GRAVITY UA: 1.01 (ref 1.01–1.02)
TRICHOMONAS: ABNORMAL
TURBIDITY: CLEAR
URINE HGB: NEGATIVE
UROBILINOGEN, URINE: NORMAL
WBC # BLD: 12.7 K/UL (ref 3.5–11.3)
WBC # BLD: ABNORMAL 10*3/UL
WBC UA: ABNORMAL /HPF (ref 0–5)
YEAST: ABNORMAL

## 2019-05-25 PROCEDURE — 86850 RBC ANTIBODY SCREEN: CPT

## 2019-05-25 PROCEDURE — 81001 URINALYSIS AUTO W/SCOPE: CPT

## 2019-05-25 PROCEDURE — 86900 BLOOD TYPING SEROLOGIC ABO: CPT

## 2019-05-25 PROCEDURE — 96372 THER/PROPH/DIAG INJ SC/IM: CPT

## 2019-05-25 PROCEDURE — 2580000003 HC RX 258: Performed by: OBSTETRICS & GYNECOLOGY

## 2019-05-25 PROCEDURE — 6370000000 HC RX 637 (ALT 250 FOR IP): Performed by: OBSTETRICS & GYNECOLOGY

## 2019-05-25 PROCEDURE — 85025 COMPLETE CBC W/AUTO DIFF WBC: CPT

## 2019-05-25 PROCEDURE — 6360000002 HC RX W HCPCS: Performed by: OBSTETRICS & GYNECOLOGY

## 2019-05-25 PROCEDURE — 86901 BLOOD TYPING SEROLOGIC RH(D): CPT

## 2019-05-25 RX ORDER — BETAMETHASONE SODIUM PHOSPHATE AND BETAMETHASONE ACETATE 3; 3 MG/ML; MG/ML
12 INJECTION, SUSPENSION INTRA-ARTICULAR; INTRALESIONAL; INTRAMUSCULAR; SOFT TISSUE ONCE
Status: COMPLETED | OUTPATIENT
Start: 2019-05-25 | End: 2019-05-25

## 2019-05-25 RX ORDER — PROMETHAZINE HYDROCHLORIDE 25 MG/ML
50 INJECTION, SOLUTION INTRAMUSCULAR; INTRAVENOUS ONCE
Status: COMPLETED | OUTPATIENT
Start: 2019-05-25 | End: 2019-05-25

## 2019-05-25 RX ORDER — HYDROXYZINE PAMOATE 50 MG/1
50 CAPSULE ORAL ONCE
Status: COMPLETED | OUTPATIENT
Start: 2019-05-25 | End: 2019-05-25

## 2019-05-25 RX ORDER — TERBUTALINE SULFATE 1 MG/ML
0.25 INJECTION, SOLUTION SUBCUTANEOUS ONCE
Status: COMPLETED | OUTPATIENT
Start: 2019-05-25 | End: 2019-05-25

## 2019-05-25 RX ORDER — SODIUM CHLORIDE, SODIUM LACTATE, POTASSIUM CHLORIDE, CALCIUM CHLORIDE 600; 310; 30; 20 MG/100ML; MG/100ML; MG/100ML; MG/100ML
INJECTION, SOLUTION INTRAVENOUS CONTINUOUS
Status: DISCONTINUED | OUTPATIENT
Start: 2019-05-25 | End: 2019-05-26 | Stop reason: HOSPADM

## 2019-05-25 RX ADMIN — SODIUM CHLORIDE, POTASSIUM CHLORIDE, SODIUM LACTATE AND CALCIUM CHLORIDE: 600; 310; 30; 20 INJECTION, SOLUTION INTRAVENOUS at 22:28

## 2019-05-25 RX ADMIN — HYDROXYZINE PAMOATE 50 MG: 50 CAPSULE ORAL at 22:27

## 2019-05-25 RX ADMIN — Medication 12 MG: at 22:18

## 2019-05-25 RX ADMIN — TERBUTALINE SULFATE 0.25 MG: 1 INJECTION SUBCUTANEOUS at 22:18

## 2019-05-25 RX ADMIN — PROMETHAZINE HYDROCHLORIDE 50 MG: 25 INJECTION INTRAMUSCULAR; INTRAVENOUS at 23:12

## 2019-05-26 VITALS
SYSTOLIC BLOOD PRESSURE: 98 MMHG | HEART RATE: 122 BPM | DIASTOLIC BLOOD PRESSURE: 57 MMHG | TEMPERATURE: 98.1 F | RESPIRATION RATE: 20 BRPM

## 2019-05-26 LAB — N. GONORRHOEAE, EXTERNAL RESULT: NORMAL

## 2019-05-26 PROCEDURE — 96361 HYDRATE IV INFUSION ADD-ON: CPT

## 2019-05-26 PROCEDURE — 99218 PR INITIAL OBSERVATION CARE/DAY 30 MINUTES: CPT | Performed by: OBSTETRICS & GYNECOLOGY

## 2019-05-26 PROCEDURE — 96360 HYDRATION IV INFUSION INIT: CPT

## 2019-05-26 PROCEDURE — 99215 OFFICE O/P EST HI 40 MIN: CPT

## 2019-05-26 PROCEDURE — 59025 FETAL NON-STRESS TEST: CPT | Performed by: OBSTETRICS & GYNECOLOGY

## 2019-05-26 NOTE — FLOWSHEET NOTE
Called to retreive patient from ER. Reports she is 34 weeks and having back pain, and cramping all day. She has hasn't drank anything all day, and just laying in bed all day. Reports Dr Swapnil Rivers usually orders her phenergan for this. Reports this is a surrogate pregnancy, and that she sees [facility] for \"high fluid and larger head and stomach on baby. \" oriented to room and clean catch urine obtained.

## 2019-05-26 NOTE — FLOWSHEET NOTE
Patient reports feeling very anxious, and nervous about having pre term labor. Enc given, and educates patient we are watching her very closely. Patient's toddler also at bedside, and crying. Lights dimmed and enc to take deep breaths.

## 2019-05-26 NOTE — PROGRESS NOTES
Progress note: 33year-old  at 34 weeks 5 days who presented last night with nausea and dehydration and contractions. Good fetal heart tones were noted and there was a reactive nonstress test.  Urinalysis was consistent with dehydration and +4 urine ketones. The patient was given 2 L of IV fluid bolus. One dose of terbutaline subcu. 50 by mouth with Vistaril. The patient later had 50 IM Phenergan. These measures did stop her  contractions and patient felt significantly better after IV fluid bolus and was discharged to home. Diagnosis of false labor contractions nausea vomiting and dehydration.

## 2019-05-26 NOTE — FLOWSHEET NOTE
Dr Goyo Flor called with patient's arrival and c/o of back pain and tightening all day today, has hardly drank any fluids today. CTX q 2 mins apart, and SVE all relayed. Is a patient of Dr Florence Gonzalez and we have no records for her. Per patient she is due 7/2/19, and is a surrogate, she also sees [facility] for \"high fluid, and baby has large abdomen and head\". See orders.

## 2019-05-30 LAB — GBS, EXTERNAL RESULT: NEGATIVE

## 2019-06-01 ENCOUNTER — HOSPITAL ENCOUNTER (OUTPATIENT)
Age: 29
Discharge: HOME | End: 2019-06-01
Attending: OBSTETRICS & GYNECOLOGY | Admitting: OBSTETRICS & GYNECOLOGY
Payer: COMMERCIAL

## 2019-06-01 VITALS
RESPIRATION RATE: 18 BRPM | SYSTOLIC BLOOD PRESSURE: 112 MMHG | HEART RATE: 125 BPM | TEMPERATURE: 98.2 F | DIASTOLIC BLOOD PRESSURE: 77 MMHG

## 2019-06-01 DIAGNOSIS — O40.3XX0 POLYHYDRAMNIOS IN THIRD TRIMESTER COMPLICATION, SINGLE OR UNSPECIFIED FETUS: ICD-10-CM

## 2019-06-01 PROCEDURE — 59025 FETAL NON-STRESS TEST: CPT

## 2019-06-01 RX ORDER — LANOLIN ALCOHOL/MO/W.PET/CERES
325 CREAM (GRAM) TOPICAL 2 TIMES DAILY
Status: ON HOLD | COMMUNITY
End: 2019-06-24

## 2019-06-01 RX ORDER — FOLIC ACID 1 MG/1
1 TABLET ORAL DAILY
Status: ON HOLD | COMMUNITY
End: 2019-06-24

## 2019-06-01 NOTE — PROGRESS NOTES
Patient presents to L&D today for polyhydramnios    IUP at 35 4/7 weeks     NST is reactive. Quality of tracing is satisfactory.

## 2019-06-04 ENCOUNTER — HOSPITAL ENCOUNTER (OUTPATIENT)
Age: 29
Discharge: HOME | End: 2019-06-04
Attending: OBSTETRICS & GYNECOLOGY | Admitting: OBSTETRICS & GYNECOLOGY
Payer: COMMERCIAL

## 2019-06-04 VITALS
TEMPERATURE: 97.7 F | DIASTOLIC BLOOD PRESSURE: 78 MMHG | SYSTOLIC BLOOD PRESSURE: 118 MMHG | HEART RATE: 115 BPM | RESPIRATION RATE: 16 BRPM

## 2019-06-04 PROCEDURE — 59025 FETAL NON-STRESS TEST: CPT

## 2019-06-04 NOTE — PROGRESS NOTES
Patient presents to L&D today for polyhydramnios    IUP at 36+0 weeks     NST is reactive. Quality of tracing is satisfactory.

## 2019-06-08 ENCOUNTER — HOSPITAL ENCOUNTER (OUTPATIENT)
Age: 29
Discharge: HOME | End: 2019-06-08
Attending: OBSTETRICS & GYNECOLOGY | Admitting: OBSTETRICS & GYNECOLOGY
Payer: COMMERCIAL

## 2019-06-08 VITALS
RESPIRATION RATE: 16 BRPM | SYSTOLIC BLOOD PRESSURE: 117 MMHG | TEMPERATURE: 98.4 F | DIASTOLIC BLOOD PRESSURE: 76 MMHG | HEART RATE: 117 BPM

## 2019-06-08 PROCEDURE — 59025 FETAL NON-STRESS TEST: CPT

## 2019-06-08 NOTE — PROGRESS NOTES
Patient presents to L&D today for Polyhydramnios    IUP at 36+4 weeks     NST is reactive. Quality of tracing is satisfactory.

## 2019-06-11 ENCOUNTER — HOSPITAL ENCOUNTER (OUTPATIENT)
Age: 29
Discharge: HOME | End: 2019-06-11
Attending: OBSTETRICS & GYNECOLOGY | Admitting: OBSTETRICS & GYNECOLOGY
Payer: COMMERCIAL

## 2019-06-11 VITALS
SYSTOLIC BLOOD PRESSURE: 115 MMHG | HEART RATE: 112 BPM | TEMPERATURE: 98.6 F | RESPIRATION RATE: 18 BRPM | DIASTOLIC BLOOD PRESSURE: 77 MMHG

## 2019-06-11 PROCEDURE — 59025 FETAL NON-STRESS TEST: CPT

## 2019-06-15 ENCOUNTER — HOSPITAL ENCOUNTER (OUTPATIENT)
Age: 29
Discharge: HOME | End: 2019-06-15
Attending: OBSTETRICS & GYNECOLOGY | Admitting: OBSTETRICS & GYNECOLOGY
Payer: COMMERCIAL

## 2019-06-15 VITALS
SYSTOLIC BLOOD PRESSURE: 118 MMHG | DIASTOLIC BLOOD PRESSURE: 73 MMHG | HEART RATE: 101 BPM | TEMPERATURE: 97.7 F | RESPIRATION RATE: 16 BRPM

## 2019-06-15 PROCEDURE — 59025 FETAL NON-STRESS TEST: CPT

## 2019-06-15 NOTE — PROGRESS NOTES
Patient presents to L&D today for poliohydramnios    IUP at 37.4 weeks     NST is reactive. Quality of tracing is satisfactory.

## 2019-06-16 ENCOUNTER — HOSPITAL ENCOUNTER (OUTPATIENT)
Age: 29
Discharge: HOME | End: 2019-06-16
Attending: OBSTETRICS & GYNECOLOGY | Admitting: OBSTETRICS & GYNECOLOGY
Payer: COMMERCIAL

## 2019-06-16 VITALS
RESPIRATION RATE: 16 BRPM | DIASTOLIC BLOOD PRESSURE: 76 MMHG | SYSTOLIC BLOOD PRESSURE: 114 MMHG | HEART RATE: 105 BPM | TEMPERATURE: 97.8 F

## 2019-06-16 LAB
-: ABNORMAL
ABO/RH: NORMAL
ABSOLUTE EOS #: 0.08 K/UL (ref 0–0.44)
ABSOLUTE IMMATURE GRANULOCYTE: 0.33 K/UL (ref 0–0.3)
ABSOLUTE LYMPH #: 2.83 K/UL (ref 1.1–3.7)
ABSOLUTE MONO #: 1.04 K/UL (ref 0.1–1.2)
AMORPHOUS: ABNORMAL
ANTIBODY SCREEN: NEGATIVE
ARM BAND NUMBER: NORMAL
BACTERIA: ABNORMAL
BASOPHILS # BLD: 1 % (ref 0–2)
BASOPHILS ABSOLUTE: 0.07 K/UL (ref 0–0.2)
BILIRUBIN URINE: NEGATIVE
CASTS UA: ABNORMAL /LPF
COLOR: YELLOW
COMMENT UA: ABNORMAL
CRYSTALS, UA: ABNORMAL /HPF
DIFFERENTIAL TYPE: ABNORMAL
EOSINOPHILS RELATIVE PERCENT: 1 % (ref 1–4)
EPITHELIAL CELLS UA: ABNORMAL /HPF (ref 0–25)
EXPIRATION DATE: NORMAL
GLUCOSE URINE: NEGATIVE
HCT VFR BLD CALC: 32.3 % (ref 36.3–47.1)
HEMOGLOBIN: 9.6 G/DL (ref 11.9–15.1)
IMMATURE GRANULOCYTES: 3 %
KETONES, URINE: NEGATIVE
LEUKOCYTE ESTERASE, URINE: ABNORMAL
LYMPHOCYTES # BLD: 22 % (ref 24–43)
MCH RBC QN AUTO: 25.4 PG (ref 25.2–33.5)
MCHC RBC AUTO-ENTMCNC: 29.7 G/DL (ref 28.4–34.8)
MCV RBC AUTO: 85.4 FL (ref 82.6–102.9)
MONOCYTES # BLD: 8 % (ref 3–12)
MUCUS: ABNORMAL
NITRITE, URINE: NEGATIVE
NRBC AUTOMATED: 0.2 PER 100 WBC
OTHER OBSERVATIONS UA: ABNORMAL
PDW BLD-RTO: 15.3 % (ref 11.8–14.4)
PH UA: 7.5 (ref 5–9)
PLATELET # BLD: 176 K/UL (ref 138–453)
PLATELET ESTIMATE: ABNORMAL
PMV BLD AUTO: 11.7 FL (ref 8.1–13.5)
PROTEIN UA: NEGATIVE
RBC # BLD: 3.78 M/UL (ref 3.95–5.11)
RBC # BLD: ABNORMAL 10*6/UL
RBC UA: ABNORMAL /HPF (ref 0–2)
RENAL EPITHELIAL, UA: ABNORMAL /HPF
SEG NEUTROPHILS: 65 % (ref 36–65)
SEGMENTED NEUTROPHILS ABSOLUTE COUNT: 8.76 K/UL (ref 1.5–8.1)
SPECIFIC GRAVITY UA: 1.01 (ref 1.01–1.02)
TRICHOMONAS: ABNORMAL
TURBIDITY: CLEAR
URINE HGB: NEGATIVE
UROBILINOGEN, URINE: NORMAL
WBC # BLD: 13.1 K/UL (ref 3.5–11.3)
WBC # BLD: ABNORMAL 10*3/UL
WBC UA: ABNORMAL /HPF (ref 0–5)
YEAST: ABNORMAL

## 2019-06-16 PROCEDURE — 81001 URINALYSIS AUTO W/SCOPE: CPT

## 2019-06-16 PROCEDURE — 96361 HYDRATE IV INFUSION ADD-ON: CPT

## 2019-06-16 PROCEDURE — 59025 FETAL NON-STRESS TEST: CPT

## 2019-06-16 PROCEDURE — 36415 COLL VENOUS BLD VENIPUNCTURE: CPT

## 2019-06-16 PROCEDURE — 99215 OFFICE O/P EST HI 40 MIN: CPT

## 2019-06-16 PROCEDURE — 6370000000 HC RX 637 (ALT 250 FOR IP): Performed by: OBSTETRICS & GYNECOLOGY

## 2019-06-16 PROCEDURE — 86850 RBC ANTIBODY SCREEN: CPT

## 2019-06-16 PROCEDURE — 85025 COMPLETE CBC W/AUTO DIFF WBC: CPT

## 2019-06-16 PROCEDURE — 2580000003 HC RX 258: Performed by: OBSTETRICS & GYNECOLOGY

## 2019-06-16 PROCEDURE — 86901 BLOOD TYPING SEROLOGIC RH(D): CPT

## 2019-06-16 PROCEDURE — 87086 URINE CULTURE/COLONY COUNT: CPT

## 2019-06-16 PROCEDURE — 86900 BLOOD TYPING SEROLOGIC ABO: CPT

## 2019-06-16 PROCEDURE — 96360 HYDRATION IV INFUSION INIT: CPT

## 2019-06-16 RX ORDER — SODIUM CHLORIDE, SODIUM LACTATE, POTASSIUM CHLORIDE, CALCIUM CHLORIDE 600; 310; 30; 20 MG/100ML; MG/100ML; MG/100ML; MG/100ML
1000 INJECTION, SOLUTION INTRAVENOUS ONCE
Status: COMPLETED | OUTPATIENT
Start: 2019-06-16 | End: 2019-06-16

## 2019-06-16 RX ORDER — ACETAMINOPHEN 500 MG
1000 TABLET ORAL ONCE
Status: DISCONTINUED | OUTPATIENT
Start: 2019-06-16 | End: 2019-06-16 | Stop reason: HOSPADM

## 2019-06-16 RX ORDER — HYDROXYZINE HYDROCHLORIDE 25 MG/1
25 TABLET, FILM COATED ORAL ONCE
Status: COMPLETED | OUTPATIENT
Start: 2019-06-16 | End: 2019-06-16

## 2019-06-16 RX ORDER — PROMETHAZINE HYDROCHLORIDE 25 MG/1
25 TABLET ORAL ONCE
Status: COMPLETED | OUTPATIENT
Start: 2019-06-16 | End: 2019-06-16

## 2019-06-16 RX ORDER — SODIUM CHLORIDE, SODIUM LACTATE, POTASSIUM CHLORIDE, CALCIUM CHLORIDE 600; 310; 30; 20 MG/100ML; MG/100ML; MG/100ML; MG/100ML
INJECTION, SOLUTION INTRAVENOUS CONTINUOUS
Status: DISCONTINUED | OUTPATIENT
Start: 2019-06-16 | End: 2019-06-16 | Stop reason: HOSPADM

## 2019-06-16 RX ADMIN — HYDROXYZINE HYDROCHLORIDE 25 MG: 25 TABLET ORAL at 14:29

## 2019-06-16 RX ADMIN — SODIUM CHLORIDE, POTASSIUM CHLORIDE, SODIUM LACTATE AND CALCIUM CHLORIDE: 600; 310; 30; 20 INJECTION, SOLUTION INTRAVENOUS at 13:12

## 2019-06-16 RX ADMIN — PROMETHAZINE HYDROCHLORIDE 25 MG: 25 TABLET ORAL at 10:57

## 2019-06-16 RX ADMIN — SODIUM CHLORIDE, POTASSIUM CHLORIDE, SODIUM LACTATE AND CALCIUM CHLORIDE 1000 ML: 600; 310; 30; 20 INJECTION, SOLUTION INTRAVENOUS at 12:09

## 2019-06-16 ASSESSMENT — PAIN DESCRIPTION - DESCRIPTORS
DESCRIPTORS: SORE
DESCRIPTORS: CRAMPING

## 2019-06-16 NOTE — PROGRESS NOTES
Obstetrical antepartum discharge instructions explained to pt, pt v.u. Pt's fiance and 3 children accompany her to home after she gets dressed.

## 2019-06-16 NOTE — PROGRESS NOTES
RN calls  [facility] and reports pt arrived with c/o nausea, cramping since last night that worsened this a.m. With vaginal pressure. SVE reported. RN also reports pt here yesterday afternoon for biweekly NST for polyhydramnios. Urine collected but not back yet reported. Pt requesting phenergan because pt states zofran makes her feel sicker. Rn also reports this is a surrogate pregnancy. Pt multip with history of vaginal deliveries. Orders received.

## 2019-06-16 NOTE — PROGRESS NOTES
Pt states she can tell that the contractions have increased in frequency and she still feels abdominal cramping and low back pain. Pt refuses warm rice sock at this time. Tylenol offered again and pt refuses at this time. Pt states nausea gone as long as she remains in upright position.

## 2019-06-16 NOTE — PROGRESS NOTES
Pt comes in from home with complaints of vaginal pressure when standing and contractions since last night that have been on and off and worsened around 06:00 this a.m. Pt denies vaginal bleeding. Pt reports baby has been slower moving the last couple of days. Pt denies headache or blurred vision. Pt c/0 nausea, denies diarrhea. Pt denies pain or burning when urinating. Pt rates cramping \"7\" on scale 1-10. Pt c/o lower back pain, rates \"5\". Pt states this is surrogacy pregnancy.

## 2019-06-16 NOTE — PROGRESS NOTES
Plan of care discussed with patient. Pt refuses oral vistaril. Pt states that the contractions feel like they spaced out but her lower back pain is still there with just an occasional abdominal cramp in the front. Pt asks RN how vistaril works and when [facility] re-educates patient on the medication and that it's a smaller dose than the last time she had it on a previous visit, pt decides to take the vistaril. Pt states she wants to go home.

## 2019-06-16 NOTE — PROGRESS NOTES
Nausea improving per pt. Tylenol offered for back pain, discomfort. Pt refuses tylenol at this time.

## 2019-06-17 LAB
CULTURE: NORMAL
Lab: NORMAL
SPECIMEN DESCRIPTION: NORMAL

## 2019-06-23 ENCOUNTER — HOSPITAL ENCOUNTER (INPATIENT)
Age: 29
LOS: 2 days | Discharge: HOME | End: 2019-06-25
Attending: OBSTETRICS & GYNECOLOGY | Admitting: OBSTETRICS & GYNECOLOGY
Payer: COMMERCIAL

## 2019-06-23 DIAGNOSIS — N94.89 UTERINE PAIN: Primary | ICD-10-CM

## 2019-06-23 DIAGNOSIS — N20.0 RENAL LITHIASIS: ICD-10-CM

## 2019-06-23 DIAGNOSIS — N20.1 URETERAL CALCULUS: ICD-10-CM

## 2019-06-23 PROBLEM — Z34.90 TERM PREGNANCY: Status: ACTIVE | Noted: 2019-06-23

## 2019-06-23 PROCEDURE — 1220000000 HC SEMI PRIVATE OB R&B

## 2019-06-23 PROCEDURE — 80306 DRUG TEST PRSMV INSTRMNT: CPT

## 2019-06-23 PROCEDURE — 85025 COMPLETE CBC W/AUTO DIFF WBC: CPT

## 2019-06-23 PROCEDURE — 2580000003 HC RX 258: Performed by: OBSTETRICS & GYNECOLOGY

## 2019-06-23 RX ORDER — ONDANSETRON 2 MG/ML
4 INJECTION INTRAMUSCULAR; INTRAVENOUS EVERY 6 HOURS PRN
Status: DISCONTINUED | OUTPATIENT
Start: 2019-06-23 | End: 2019-06-24

## 2019-06-23 RX ORDER — SODIUM CHLORIDE 0.9 % (FLUSH) 0.9 %
10 SYRINGE (ML) INJECTION EVERY 12 HOURS SCHEDULED
Status: DISCONTINUED | OUTPATIENT
Start: 2019-06-24 | End: 2019-06-24

## 2019-06-23 RX ORDER — METHYLERGONOVINE MALEATE 0.2 MG/ML
200 INJECTION INTRAVENOUS PRN
Status: DISCONTINUED | OUTPATIENT
Start: 2019-06-23 | End: 2019-06-24

## 2019-06-23 RX ORDER — MISOPROSTOL 100 UG/1
900 TABLET ORAL PRN
Status: DISCONTINUED | OUTPATIENT
Start: 2019-06-23 | End: 2019-06-24

## 2019-06-23 RX ORDER — CARBOPROST TROMETHAMINE 250 UG/ML
250 INJECTION, SOLUTION INTRAMUSCULAR PRN
Status: DISCONTINUED | OUTPATIENT
Start: 2019-06-23 | End: 2019-06-24

## 2019-06-23 RX ORDER — LIDOCAINE HYDROCHLORIDE 10 MG/ML
30 INJECTION, SOLUTION EPIDURAL; INFILTRATION; INTRACAUDAL; PERINEURAL PRN
Status: DISCONTINUED | OUTPATIENT
Start: 2019-06-23 | End: 2019-06-24

## 2019-06-23 RX ORDER — SODIUM CHLORIDE 0.9 % (FLUSH) 0.9 %
10 SYRINGE (ML) INJECTION PRN
Status: DISCONTINUED | OUTPATIENT
Start: 2019-06-23 | End: 2019-06-24

## 2019-06-23 RX ORDER — SEVOFLURANE 250 ML/250ML
1 LIQUID RESPIRATORY (INHALATION) CONTINUOUS PRN
Status: DISCONTINUED | OUTPATIENT
Start: 2019-06-23 | End: 2019-06-24

## 2019-06-23 RX ORDER — NALBUPHINE HCL 10 MG/ML
10 AMPUL (ML) INJECTION
Status: DISCONTINUED | OUTPATIENT
Start: 2019-06-23 | End: 2019-06-24

## 2019-06-23 RX ORDER — ACETAMINOPHEN 325 MG/1
650 TABLET ORAL EVERY 4 HOURS PRN
Status: DISCONTINUED | OUTPATIENT
Start: 2019-06-23 | End: 2019-06-24

## 2019-06-23 RX ORDER — SODIUM CHLORIDE, SODIUM LACTATE, POTASSIUM CHLORIDE, CALCIUM CHLORIDE 600; 310; 30; 20 MG/100ML; MG/100ML; MG/100ML; MG/100ML
INJECTION, SOLUTION INTRAVENOUS PRN
Status: DISCONTINUED | OUTPATIENT
Start: 2019-06-23 | End: 2019-06-24

## 2019-06-23 RX ADMIN — SODIUM CHLORIDE, POTASSIUM CHLORIDE, SODIUM LACTATE AND CALCIUM CHLORIDE: 600; 310; 30; 20 INJECTION, SOLUTION INTRAVENOUS at 23:52

## 2019-06-24 ENCOUNTER — ANESTHESIA EVENT (OUTPATIENT)
Dept: LABOR AND DELIVERY | Age: 29
End: 2019-06-24
Payer: COMMERCIAL

## 2019-06-24 ENCOUNTER — ANESTHESIA (OUTPATIENT)
Dept: LABOR AND DELIVERY | Age: 29
End: 2019-06-24
Payer: COMMERCIAL

## 2019-06-24 LAB
ABSOLUTE EOS #: 0.11 K/UL (ref 0–0.44)
ABSOLUTE IMMATURE GRANULOCYTE: 0.31 K/UL (ref 0–0.3)
ABSOLUTE LYMPH #: 3.65 K/UL (ref 1.1–3.7)
ABSOLUTE MONO #: 1.36 K/UL (ref 0.1–1.2)
AMPHETAMINE SCREEN URINE: NEGATIVE
BARBITURATE SCREEN URINE: NEGATIVE
BASOPHILS # BLD: 0 % (ref 0–2)
BASOPHILS ABSOLUTE: 0.05 K/UL (ref 0–0.2)
BENZODIAZEPINE SCREEN, URINE: NEGATIVE
BUPRENORPHINE URINE: NEGATIVE
CANNABINOID SCREEN URINE: NEGATIVE
COCAINE METABOLITE, URINE: NEGATIVE
DIFFERENTIAL TYPE: ABNORMAL
EOSINOPHILS RELATIVE PERCENT: 1 % (ref 1–4)
HCT VFR BLD CALC: 31.2 % (ref 36.3–47.1)
HEMOGLOBIN: 9.3 G/DL (ref 11.9–15.1)
IMMATURE GRANULOCYTES: 2 %
LYMPHOCYTES # BLD: 24 % (ref 24–43)
MCH RBC QN AUTO: 24.9 PG (ref 25.2–33.5)
MCHC RBC AUTO-ENTMCNC: 29.8 G/DL (ref 28.4–34.8)
MCV RBC AUTO: 83.4 FL (ref 82.6–102.9)
MDMA URINE: NORMAL
METHADONE SCREEN, URINE: NEGATIVE
METHAMPHETAMINE, URINE: NEGATIVE
MONOCYTES # BLD: 9 % (ref 3–12)
NRBC AUTOMATED: 0 PER 100 WBC
OPIATES, URINE: NEGATIVE
OXYCODONE SCREEN URINE: NEGATIVE
PDW BLD-RTO: 15.6 % (ref 11.8–14.4)
PHENCYCLIDINE, URINE: NEGATIVE
PLATELET # BLD: 200 K/UL (ref 138–453)
PLATELET ESTIMATE: ABNORMAL
PMV BLD AUTO: 11.3 FL (ref 8.1–13.5)
PROPOXYPHENE, URINE: NEGATIVE
RBC # BLD: 3.74 M/UL (ref 3.95–5.11)
RBC # BLD: ABNORMAL 10*6/UL
SEG NEUTROPHILS: 64 % (ref 36–65)
SEGMENTED NEUTROPHILS ABSOLUTE COUNT: 9.62 K/UL (ref 1.5–8.1)
TEST INFORMATION: NORMAL
TRICYCLIC ANTIDEPRESSANTS, UR: NEGATIVE
WBC # BLD: 15.1 K/UL (ref 3.5–11.3)
WBC # BLD: ABNORMAL 10*3/UL

## 2019-06-24 PROCEDURE — 6370000000 HC RX 637 (ALT 250 FOR IP): Performed by: OBSTETRICS & GYNECOLOGY

## 2019-06-24 PROCEDURE — 88307 TISSUE EXAM BY PATHOLOGIST: CPT

## 2019-06-24 PROCEDURE — 2580000003 HC RX 258: Performed by: OBSTETRICS & GYNECOLOGY

## 2019-06-24 PROCEDURE — 51702 INSERT TEMP BLADDER CATH: CPT

## 2019-06-24 PROCEDURE — 6360000002 HC RX W HCPCS: Performed by: NURSE ANESTHETIST, CERTIFIED REGISTERED

## 2019-06-24 PROCEDURE — 1200000000 HC SEMI PRIVATE

## 2019-06-24 PROCEDURE — 7200000001 HC VAGINAL DELIVERY

## 2019-06-24 PROCEDURE — 2500000003 HC RX 250 WO HCPCS: Performed by: NURSE ANESTHETIST, CERTIFIED REGISTERED

## 2019-06-24 PROCEDURE — 6360000002 HC RX W HCPCS: Performed by: OBSTETRICS & GYNECOLOGY

## 2019-06-24 PROCEDURE — 3700000025 EPIDURAL BLOCK: Performed by: NURSE ANESTHETIST, CERTIFIED REGISTERED

## 2019-06-24 RX ORDER — LANOLIN 100 %
OINTMENT (GRAM) TOPICAL PRN
Status: DISCONTINUED | OUTPATIENT
Start: 2019-06-24 | End: 2019-06-25 | Stop reason: HOSPADM

## 2019-06-24 RX ORDER — SODIUM CHLORIDE 0.9 % (FLUSH) 0.9 %
10 SYRINGE (ML) INJECTION PRN
Status: DISCONTINUED | OUTPATIENT
Start: 2019-06-24 | End: 2019-06-25 | Stop reason: HOSPADM

## 2019-06-24 RX ORDER — LIDOCAINE HYDROCHLORIDE 20 MG/ML
INJECTION, SOLUTION EPIDURAL; INFILTRATION; INTRACAUDAL; PERINEURAL PRN
Status: DISCONTINUED | OUTPATIENT
Start: 2019-06-24 | End: 2019-06-24 | Stop reason: SDUPTHER

## 2019-06-24 RX ORDER — EPHEDRINE SULFATE 50 MG/ML
5 INJECTION, SOLUTION INTRAVENOUS EVERY 5 MIN PRN
Status: DISCONTINUED | OUTPATIENT
Start: 2019-06-24 | End: 2019-06-24

## 2019-06-24 RX ORDER — ROPIVACAINE HYDROCHLORIDE 2 MG/ML
10 INJECTION, SOLUTION EPIDURAL; INFILTRATION; PERINEURAL CONTINUOUS
Status: DISCONTINUED | OUTPATIENT
Start: 2019-06-24 | End: 2019-06-24

## 2019-06-24 RX ORDER — ROPIVACAINE HYDROCHLORIDE 2 MG/ML
INJECTION, SOLUTION EPIDURAL; INFILTRATION; PERINEURAL CONTINUOUS PRN
Status: DISCONTINUED | OUTPATIENT
Start: 2019-06-24 | End: 2019-06-24 | Stop reason: SDUPTHER

## 2019-06-24 RX ORDER — ONDANSETRON 2 MG/ML
4 INJECTION INTRAMUSCULAR; INTRAVENOUS EVERY 6 HOURS PRN
Status: DISCONTINUED | OUTPATIENT
Start: 2019-06-24 | End: 2019-06-25 | Stop reason: HOSPADM

## 2019-06-24 RX ORDER — FERROUS SULFATE 325(65) MG
325 TABLET ORAL 3 TIMES DAILY PRN
Status: DISCONTINUED | OUTPATIENT
Start: 2019-06-24 | End: 2019-06-25 | Stop reason: HOSPADM

## 2019-06-24 RX ORDER — IBUPROFEN 800 MG/1
800 TABLET ORAL EVERY 8 HOURS
Status: DISCONTINUED | OUTPATIENT
Start: 2019-06-24 | End: 2019-06-25 | Stop reason: HOSPADM

## 2019-06-24 RX ORDER — SODIUM CHLORIDE 0.9 % (FLUSH) 0.9 %
10 SYRINGE (ML) INJECTION EVERY 12 HOURS SCHEDULED
Status: DISCONTINUED | OUTPATIENT
Start: 2019-06-24 | End: 2019-06-25 | Stop reason: HOSPADM

## 2019-06-24 RX ORDER — ACETAMINOPHEN 325 MG/1
650 TABLET ORAL EVERY 4 HOURS PRN
Status: DISCONTINUED | OUTPATIENT
Start: 2019-06-24 | End: 2019-06-25 | Stop reason: HOSPADM

## 2019-06-24 RX ORDER — METHYLERGONOVINE MALEATE 0.2 MG/ML
200 INJECTION INTRAVENOUS
Status: ACTIVE | OUTPATIENT
Start: 2019-06-24 | End: 2019-06-24

## 2019-06-24 RX ORDER — OXYCODONE HYDROCHLORIDE AND ACETAMINOPHEN 5; 325 MG/1; MG/1
2 TABLET ORAL ONCE
Status: COMPLETED | OUTPATIENT
Start: 2019-06-24 | End: 2019-06-24

## 2019-06-24 RX ORDER — SODIUM CHLORIDE, SODIUM LACTATE, POTASSIUM CHLORIDE, CALCIUM CHLORIDE 600; 310; 30; 20 MG/100ML; MG/100ML; MG/100ML; MG/100ML
INJECTION, SOLUTION INTRAVENOUS CONTINUOUS
Status: DISCONTINUED | OUTPATIENT
Start: 2019-06-24 | End: 2019-06-25 | Stop reason: HOSPADM

## 2019-06-24 RX ORDER — FENTANYL CITRATE 50 UG/ML
INJECTION, SOLUTION INTRAMUSCULAR; INTRAVENOUS PRN
Status: DISCONTINUED | OUTPATIENT
Start: 2019-06-24 | End: 2019-06-24 | Stop reason: SDUPTHER

## 2019-06-24 RX ORDER — DOCUSATE SODIUM 100 MG/1
100 CAPSULE, LIQUID FILLED ORAL 2 TIMES DAILY
Status: DISCONTINUED | OUTPATIENT
Start: 2019-06-24 | End: 2019-06-25 | Stop reason: HOSPADM

## 2019-06-24 RX ORDER — ROPIVACAINE HYDROCHLORIDE 5 MG/ML
INJECTION, SOLUTION EPIDURAL; INFILTRATION; PERINEURAL PRN
Status: DISCONTINUED | OUTPATIENT
Start: 2019-06-24 | End: 2019-06-24 | Stop reason: SDUPTHER

## 2019-06-24 RX ORDER — ROPIVACAINE HYDROCHLORIDE 2 MG/ML
INJECTION, SOLUTION EPIDURAL; INFILTRATION; PERINEURAL PRN
Status: DISCONTINUED | OUTPATIENT
Start: 2019-06-24 | End: 2019-06-24 | Stop reason: SDUPTHER

## 2019-06-24 RX ADMIN — ACETAMINOPHEN 650 MG: 325 TABLET, FILM COATED ORAL at 17:55

## 2019-06-24 RX ADMIN — FERROUS SULFATE TAB 325 MG (65 MG ELEMENTAL FE) 325 MG: 325 (65 FE) TAB at 12:40

## 2019-06-24 RX ADMIN — DOCUSATE SODIUM 100 MG: 100 CAPSULE, LIQUID FILLED ORAL at 17:02

## 2019-06-24 RX ADMIN — ROPIVACAINE HYDROCHLORIDE 10 ML/HR: 2 INJECTION, SOLUTION EPIDURAL; INFILTRATION at 03:04

## 2019-06-24 RX ADMIN — OXYCODONE HYDROCHLORIDE AND ACETAMINOPHEN 2 TABLET: 5; 325 TABLET ORAL at 22:34

## 2019-06-24 RX ADMIN — Medication 10 ML: at 20:04

## 2019-06-24 RX ADMIN — IBUPROFEN 800 MG: 800 TABLET ORAL at 20:02

## 2019-06-24 RX ADMIN — FENTANYL CITRATE 100 MCG: 50 INJECTION INTRAMUSCULAR; INTRAVENOUS at 07:16

## 2019-06-24 RX ADMIN — BENZOCAINE AND LEVOMENTHOL: 200; 5 SPRAY TOPICAL at 13:05

## 2019-06-24 RX ADMIN — ROPIVACAINE HYDROCHLORIDE 3 ML: 5 INJECTION, SOLUTION EPIDURAL; INFILTRATION; PERINEURAL at 07:17

## 2019-06-24 RX ADMIN — OXYTOCIN-SODIUM CHLORIDE 0.9% IV SOLN 30 UNIT/500ML 1 MILLI-UNITS/MIN: 30-0.9/5 SOLUTION at 05:11

## 2019-06-24 RX ADMIN — LIDOCAINE HYDROCHLORIDE 4 ML: 20 INJECTION, SOLUTION EPIDURAL; INFILTRATION; INTRACAUDAL at 02:57

## 2019-06-24 RX ADMIN — LIDOCAINE HYDROCHLORIDE 5 ML: 20 INJECTION, SOLUTION EPIDURAL; INFILTRATION; INTRACAUDAL at 07:15

## 2019-06-24 RX ADMIN — ONDANSETRON 4 MG: 2 INJECTION INTRAMUSCULAR; INTRAVENOUS at 10:32

## 2019-06-24 RX ADMIN — FERROUS SULFATE TAB 325 MG (65 MG ELEMENTAL FE) 325 MG: 325 (65 FE) TAB at 17:02

## 2019-06-24 RX ADMIN — SODIUM CHLORIDE, POTASSIUM CHLORIDE, SODIUM LACTATE AND CALCIUM CHLORIDE: 600; 310; 30; 20 INJECTION, SOLUTION INTRAVENOUS at 02:26

## 2019-06-24 RX ADMIN — IBUPROFEN 800 MG: 800 TABLET ORAL at 13:04

## 2019-06-24 RX ADMIN — Medication 10 ML: at 13:05

## 2019-06-24 RX ADMIN — ROPIVACAINE HYDROCHLORIDE 5 ML: 2 INJECTION, SOLUTION EPIDURAL; INFILTRATION at 03:04

## 2019-06-24 RX ADMIN — Medication 40 EACH: at 13:05

## 2019-06-24 RX ADMIN — LIDOCAINE HYDROCHLORIDE 1 ML: 20 INJECTION, SOLUTION EPIDURAL; INFILTRATION; INTRACAUDAL at 03:00

## 2019-06-24 ASSESSMENT — PAIN SCALES - GENERAL
PAINLEVEL_OUTOF10: 5
PAINLEVEL_OUTOF10: 9
PAINLEVEL_OUTOF10: 7
PAINLEVEL_OUTOF10: 7
PAINLEVEL_OUTOF10: 0
PAINLEVEL_OUTOF10: 9

## 2019-06-24 ASSESSMENT — PAIN DESCRIPTION - DESCRIPTORS
DESCRIPTORS: CRAMPING
DESCRIPTORS: CRAMPING
DESCRIPTORS: ACHING

## 2019-06-24 ASSESSMENT — PAIN DESCRIPTION - PAIN TYPE
TYPE: ACUTE PAIN
TYPE: ACUTE PAIN

## 2019-06-24 ASSESSMENT — PAIN DESCRIPTION - LOCATION
LOCATION: ABDOMEN;BACK

## 2019-06-24 ASSESSMENT — PAIN DESCRIPTION - ORIENTATION
ORIENTATION: LOWER

## 2019-06-24 ASSESSMENT — PAIN DESCRIPTION - FREQUENCY
FREQUENCY: INTERMITTENT
FREQUENCY: INTERMITTENT

## 2019-06-24 NOTE — PLAN OF CARE
pain  Description  Control of acute pain  Outcome: Ongoing  Goal: Control of chronic pain  Description  Control of chronic pain  Outcome: Ongoing

## 2019-06-24 NOTE — FLOWSHEET NOTE
Arrives to unit with SO for scheduled induction of labor. Oriented to room. UA obtained, and changes into gown. This is a surrogate pregnancy, natural father shown to room 203. Father plans to do skin to skin with baby after delivery.

## 2019-06-24 NOTE — FLOWSHEET NOTE
Dr Deloise Felty notified of SVE and ctx pattern. Comfortable with epidural. May start pitocin per protocol. See orders.

## 2019-06-24 NOTE — PLAN OF CARE
Problem: Discharge Planning:  Goal: Discharged to appropriate level of care  Description  Discharged to appropriate level of care  6/24/2019 1116 by Nelly Tatum RN  Outcome: Ongoing  6/24/2019 0839 by Nelly Tatum RN  Outcome: Ongoing  6/23/2019 2251 by Nathan Oliver RN  Outcome: Ongoing     Problem: Constipation:  Goal: Bowel elimination is within specified parameters  Description  Bowel elimination is within specified parameters  6/24/2019 1116 by Nelly Tatum RN  Outcome: Ongoing  6/24/2019 0839 by Nelly Tatum RN  Outcome: Ongoing  6/23/2019 2251 by Nathan Oliver RN  Outcome: Ongoing     Problem: Fluid Volume - Imbalance:  Goal: Absence of imbalanced fluid volume signs and symptoms  Description  Absence of imbalanced fluid volume signs and symptoms  6/24/2019 1116 by Nelly Tatum RN  Outcome: Ongoing  6/24/2019 0839 by Nelly Tatum RN  Outcome: Ongoing  6/23/2019 2251 by Nathan Oliver RN  Outcome: Ongoing  Goal: Absence of postpartum hemorrhage signs and symptoms  Description  Absence of postpartum hemorrhage signs and symptoms  6/24/2019 1116 by Nelly Tatum RN  Outcome: Ongoing  6/24/2019 0839 by Nelly Tatum RN  Outcome: Ongoing  6/23/2019 2251 by Nathan Oliver RN  Outcome: Ongoing     Problem: Mood - Altered:  Goal: Mood stable  Description  Mood stable  6/24/2019 1116 by Nelly Tatum RN  Outcome: Ongoing  6/24/2019 0839 by Nelly Tatum RN  Outcome: Ongoing  6/23/2019 2251 by Nathan Oliver RN  Outcome: Ongoing     Problem: Pain - Acute:  Goal: Pain level will decrease  Description  Pain level will decrease  6/24/2019 1116 by Nelly Tatum RN  Outcome: Ongoing  6/24/2019 0839 by Nelly Tatum RN  Outcome: Ongoing  6/23/2019 2251 by Nathan Oliver RN  Outcome: Ongoing     Problem: Pain:  Goal: Pain level will decrease  Description  Pain level will decrease  6/24/2019 1116 by Nelly Tatum RN  Outcome: Ongoing  6/24/2019 6779 by Familia Ramon RN  Outcome: Ongoing  6/23/2019 2251 by Xavier Walker RN  Outcome: Ongoing  Goal: Control of acute pain  Description  Control of acute pain  6/24/2019 1116 by Familia Ramon RN  Outcome: Ongoing  6/24/2019 0839 by Familia Ramon RN  Outcome: Ongoing  Goal: Control of chronic pain  Description  Control of chronic pain  6/24/2019 1116 by Familia Ramon RN  Outcome: Ongoing  6/24/2019 0839 by Familia Ramon RN  Outcome: Ongoing

## 2019-06-24 NOTE — PLAN OF CARE
Problem: Discharge Planning:  Goal: Discharged to appropriate level of care  Description  Discharged to appropriate level of care  Outcome: Ongoing     Problem: Constipation:  Goal: Bowel elimination is within specified parameters  Description  Bowel elimination is within specified parameters  Outcome: Ongoing     Problem: Fluid Volume - Imbalance:  Goal: Absence of imbalanced fluid volume signs and symptoms  Description  Absence of imbalanced fluid volume signs and symptoms  Outcome: Ongoing  Goal: Absence of postpartum hemorrhage signs and symptoms  Description  Absence of postpartum hemorrhage signs and symptoms  Outcome: Ongoing     Problem: Infection - Risk of, Puerperal Infection:  Goal: Will show no infection signs and symptoms  Description  Will show no infection signs and symptoms  Outcome: Ongoing     Problem: Mood - Altered:  Goal: Mood stable  Description  Mood stable  Outcome: Ongoing     Problem: Pain - Acute:  Goal: Pain level will decrease  Description  Pain level will decrease  Outcome: Ongoing

## 2019-06-24 NOTE — L&D DELIVERY SUMMARY NOTE
Department of Obstetrics and Gynecology  Spontaneous Vaginal Delivery Note    Labor & Delivery Summary  Dilation Complete Date: 19  Dilation Complete Time: 915    Pre-operative Diagnosis:  Term pregnancy Surrogate mother Hydramnios    Post-operative Diagnosis:  Same, delivered, Term single living child 1 tight nuchal cord    Procedure:  Spontaneous vaginal delivery    Surgeon:  Kristofer Leger M.D. Information for the patient's :  Chris Feliciano [[id number]]          Anesthesia:  epidural anesthesia    Estimated blood loss:  400ml    Specimen:  Placenta was sent for pathological analysis. Umbilical cord blood sent Yes    Umbilical cord blood gases: not required    Complications:  none    Condition:  infant stable to general nursery    Details of Procedure: The patient is a 33 y.o. female at 38w7d   OB History        4    Para   3    Term   3            AB        Living   3       SAB        TAB        Ectopic        Molar        Multiple        Live Births   3             who was admitted for latent labor. She received the following interventions: Coretha Ditch The patient progressed well,did receive an epidural, became complete and started to push. After pushing for 10 minutes the fetal head was at the perineum. The nose and mouth were suctioned with bulb suction, a tight loop nuchal cord was cut and the rest of the infant delivered atraumatically, placed on mother abdomen. The cord was clamped and cut and infant handed off to the waiting nurse for evaluation. The delivery of the placenta was spontaneous. The perineum and vagina were explored and a first degree laceration was repaired in standard fashion using 00-chromic.

## 2019-06-24 NOTE — ANESTHESIA PROCEDURE NOTES
Epidural Block    Patient location during procedure: OB  Start time: 6/24/2019 2:49 AM  End time: 6/24/2019 2:52 AM  Reason for block: labor epidural  Staffing  Resident/CRNA: FÁTIMA Chavez CRNA  Preanesthetic Checklist  Completed: patient identified, surgical consent, pre-op evaluation, timeout performed, IV checked, risks and benefits discussed, monitors and equipment checked, anesthesia consent given, oxygen available and patient being monitored  Epidural  Patient position: sitting  Prep: ChloraPrep  Patient monitoring: continuous pulse ox and frequent blood pressure checks  Approach: midline  Location: lumbar (1-5) (L3-4)  Injection technique: BENITO air and BENITO saline  Provider prep: mask and sterile gloves  Needle  Needle type: Tuohy   Needle gauge: 17 G  Needle length: 3.5 in  Needle insertion depth: 4 cm  Catheter type: side hole  Catheter size: 19 G  Catheter at skin depth: 10 cm  Test dose: negative  Kit: zavala  Lot number: [id number]  Expiration date: 3/31/2020  Assessment  Hemodynamics: stable  Attempts: 1

## 2019-06-24 NOTE — H&P
Department of Obstetrics and Gynecology   Obstetrics History and Physical        CHIEF COMPLAINT:  Active labor. HISTORY OF PRESENT ILLNESS:      The patient is a 33 y.o. female at 44w7d. OB History        4    Para   3    Term   3            AB        Living   3       SAB        TAB        Ectopic        Molar        Multiple        Live Births   3            Patient presents in active labor.     Estimated Due Date: Estimated Date of Delivery: 19    PRENATAL CARE:    Complicated by: mild hydramnios    PAST OB HISTORY:  OB History        4    Para   3    Term   3            AB        Living   3       SAB        TAB        Ectopic        Molar        Multiple        Live Births   3                Past Medical History:        Diagnosis Date    Anemia     GERD (gastroesophageal reflux disease)     Kidney stones     Postpartum depression      Past Surgical History:        Procedure Laterality Date    EXTERNAL EAR SURGERY      OTHER SURGICAL HISTORY Right 2016    right cysto HLL with stent exchange    UPPER GASTROINTESTINAL ENDOSCOPY       Allergies:  Reglan [metoclopramide]    Social History:    Social History     Socioeconomic History    Marital status: Single     Spouse name: Not on file    Number of children: Not on file    Years of education: Not on file    Highest education level: Not on file   Occupational History    Not on file   Social Needs    Financial resource strain: Not on file    Food insecurity:     Worry: Not on file     Inability: Not on file    Transportation needs:     Medical: Not on file     Non-medical: Not on file   Tobacco Use    Smoking status: Never Smoker    Smokeless tobacco: Never Used   Substance and Sexual Activity    Alcohol use: No    Drug use: No    Sexual activity: Yes     Partners: Male   Lifestyle    Physical activity:     Days per week: Not on file     Minutes per session: Not on file    Stress: Not on file midline, no thyroidmegaly  Heart: Regular rate and rhythm without murmur  Neurologic:  Awake, alert, oriented to name, place and time. Cranial nerves II-X11 grossly intact. Patellar DTR's 2/4 bilaterally. No clonus  Lungs:  No increased work of breathing, good air exchange  Abdomen:  Soft, non tender, gravid, consistent with her gestational age. Fundal height 40 CM  Fetal heart rate:  Reassuring, Cat 1, appropriate for gestational age  Pelvis:  Adequate pelvis  Cervix: 4 cm 75% soft -2  Contraction frequency:  3 minutes    Membranes:  Ruptured meconium stained light    Labs:   CBC:   Lab Results   Component Value Date    WBC 13.1 06/16/2019    RBC 3.78 06/16/2019    RBC 4.64 08/28/2011    HGB 9.6 06/16/2019    HCT 32.3 06/16/2019    MCV 85.4 06/16/2019    MCH 25.4 06/16/2019    MCHC 29.7 06/16/2019    RDW 15.3 06/16/2019     06/16/2019     08/28/2011    MPV 11.7 06/16/2019       Tracing is reassuring. ASSESSMENT: Term Intrauterine Pregnancy in active labor. Hydramnios (mild), Light meconium stained AF    PLAN: Antepartum labor protocol.        GBS: neg

## 2019-06-24 NOTE — ANESTHESIA PRE PROCEDURE
Intravenous Continuous PRN Amanda Almeida MD        methylergonovine (METHERGINE) injection 200 mcg  200 mcg Intramuscular PRN Amanda Almeida MD        carboprost (HEMABATE) injection 250 mcg  250 mcg Intramuscular PRN Amanda Almeida MD        misoprostol (CYTOTEC) tablet 900 mcg  900 mcg Rectal PRN Amanda Rogers, MD       Heloise Jose Antonio witch hazel-glycerin (TUCKS) pad   Topical PRN Amanda Almeida MD        benzocaine-menthol (DERMOPLAST) 20-0.5 % spray   Topical PRN Amanda Almeida MD           Allergies: Allergies   Allergen Reactions    Reglan [Metoclopramide]      Causes super anxiousness       Problem List:    Patient Active Problem List   Diagnosis Code    GERD (gastroesophageal reflux disease) K21.9    Renal lithiasis N20.0    Ureteral calculus N20.1     contractions O47.9    Term pregnancy Z34.80       Past Medical History:        Diagnosis Date    Anemia     GERD (gastroesophageal reflux disease)     Kidney stones     Postpartum depression        Past Surgical History:        Procedure Laterality Date    EXTERNAL EAR SURGERY      OTHER SURGICAL HISTORY Right 2016    right cysto HLL with stent exchange    UPPER GASTROINTESTINAL ENDOSCOPY         Social History:    Social History     Tobacco Use    Smoking status: Never Smoker    Smokeless tobacco: Never Used   Substance Use Topics    Alcohol use:  No                                Counseling given: Not Answered      Vital Signs (Current):   Vitals:    19 0255 19 0256 19 0258 19 0300   BP: 122/68 (!) 125/56 117/67 117/67   Pulse: 102 102 112 112   Resp:       Temp:       TempSrc:       SpO2:       Weight:       Height:                                                  BP Readings from Last 3 Encounters:   19 117/67   19 114/76   06/15/19 118/73       NPO Status: Time of last liquid consumption: 2309                        Time of last solid consumption: 1700                        Date of last liquid consumption: 06/23/19                        Date of last solid food consumption: 06/23/19    BMI:   Wt Readings from Last 3 Encounters:   06/23/19 162 lb (73.5 kg)   11/29/18 146 lb (66.2 kg)   09/25/18 146 lb (66.2 kg)     Body mass index is 28.7 kg/m². CBC:   Lab Results   Component Value Date    WBC 15.1 06/23/2019    RBC 3.74 06/23/2019    RBC 4.64 08/28/2011    HGB 9.3 06/23/2019    HCT 31.2 06/23/2019    MCV 83.4 06/23/2019    RDW 15.6 06/23/2019     06/23/2019     08/28/2011       CMP:   Lab Results   Component Value Date     11/25/2018    K 4.0 11/25/2018     11/25/2018    CO2 24 11/25/2018    BUN 8 11/25/2018    CREATININE 0.54 11/25/2018    GFRAA >60 11/25/2018    LABGLOM >60 11/25/2018    GLUCOSE 86 11/25/2018    GLUCOSE 81 08/28/2011    PROT 6.8 11/25/2018    CALCIUM 9.0 11/25/2018    BILITOT 0.22 11/25/2018    ALKPHOS 73 11/25/2018    AST 15 11/25/2018    ALT 12 11/25/2018       POC Tests: No results for input(s): POCGLU, POCNA, POCK, POCCL, POCBUN, POCHEMO, POCHCT in the last 72 hours.     Coags: No results found for: PROTIME, INR, APTT    HCG (If Applicable):   Lab Results   Component Value Date    PREGTESTUR NEGATIVE 06/10/2016    HCGQUANT 25,664 (H) 11/07/2018        ABGs: No results found for: PHART, PO2ART, TDZ7UES, GSW6UJL, BEART, W9CVQQLN     Type & Screen (If Applicable):  No results found for: LABABO, LABRH    Anesthesia Evaluation   no history of anesthetic complications:   Airway: Mallampati: II  TM distance: >3 FB   Neck ROM: full  Mouth opening: > = 3 FB Dental: normal exam         Pulmonary:Negative Pulmonary ROS and normal exam  breath sounds clear to auscultation                             Cardiovascular:  Exercise tolerance: good (>4 METS),                     Neuro/Psych:   Negative Neuro/Psych ROS              GI/Hepatic/Renal:   (+) GERD: poorly controlled,           Endo/Other: Negative Endo/Other ROS                    Abdominal:           Vascular:

## 2019-06-25 VITALS
WEIGHT: 155.5 LBS | HEIGHT: 63 IN | RESPIRATION RATE: 16 BRPM | SYSTOLIC BLOOD PRESSURE: 116 MMHG | OXYGEN SATURATION: 98 % | BODY MASS INDEX: 27.55 KG/M2 | TEMPERATURE: 97.9 F | HEART RATE: 85 BPM | DIASTOLIC BLOOD PRESSURE: 73 MMHG

## 2019-06-25 LAB
ABSOLUTE EOS #: 0.14 K/UL (ref 0–0.44)
ABSOLUTE IMMATURE GRANULOCYTE: 0.14 K/UL (ref 0–0.3)
ABSOLUTE LYMPH #: 3.12 K/UL (ref 1.1–3.7)
ABSOLUTE MONO #: 0.28 K/UL (ref 0.1–1.2)
BASOPHILS # BLD: 0 % (ref 0–2)
BASOPHILS ABSOLUTE: 0 K/UL (ref 0–0.2)
DIFFERENTIAL TYPE: ABNORMAL
EOSINOPHILS RELATIVE PERCENT: 1 % (ref 1–4)
HCT VFR BLD CALC: 27.5 % (ref 36.3–47.1)
HEMOGLOBIN: 8.3 G/DL (ref 11.9–15.1)
IMMATURE GRANULOCYTES: 1 %
LYMPHOCYTES # BLD: 22 % (ref 24–43)
MCH RBC QN AUTO: 24.9 PG (ref 25.2–33.5)
MCHC RBC AUTO-ENTMCNC: 30.2 G/DL (ref 28.4–34.8)
MCV RBC AUTO: 82.6 FL (ref 82.6–102.9)
MONOCYTES # BLD: 2 % (ref 3–12)
MORPHOLOGY: NORMAL
NRBC AUTOMATED: 0 PER 100 WBC
PDW BLD-RTO: 15.9 % (ref 11.8–14.4)
PLATELET # BLD: 209 K/UL (ref 138–453)
PLATELET ESTIMATE: ABNORMAL
PMV BLD AUTO: 11.3 FL (ref 8.1–13.5)
RBC # BLD: 3.33 M/UL (ref 3.95–5.11)
RBC # BLD: ABNORMAL 10*6/UL
SEG NEUTROPHILS: 74 % (ref 36–65)
SEGMENTED NEUTROPHILS ABSOLUTE COUNT: 10.52 K/UL (ref 1.5–8.1)
WBC # BLD: 14.2 K/UL (ref 3.5–11.3)
WBC # BLD: ABNORMAL 10*3/UL

## 2019-06-25 PROCEDURE — 0HQ9XZZ REPAIR PERINEUM SKIN, EXTERNAL APPROACH: ICD-10-PCS | Performed by: OBSTETRICS & GYNECOLOGY

## 2019-06-25 PROCEDURE — 6370000000 HC RX 637 (ALT 250 FOR IP): Performed by: OBSTETRICS & GYNECOLOGY

## 2019-06-25 PROCEDURE — 36415 COLL VENOUS BLD VENIPUNCTURE: CPT

## 2019-06-25 PROCEDURE — 85025 COMPLETE CBC W/AUTO DIFF WBC: CPT

## 2019-06-25 RX ORDER — OXYCODONE HYDROCHLORIDE AND ACETAMINOPHEN 5; 325 MG/1; MG/1
2 TABLET ORAL EVERY 8 HOURS PRN
Qty: 12 TABLET | Refills: 0 | Status: SHIPPED | OUTPATIENT
Start: 2019-06-25 | End: 2019-06-28

## 2019-06-25 RX ADMIN — IBUPROFEN 800 MG: 800 TABLET ORAL at 06:00

## 2019-06-25 RX ADMIN — DOCUSATE SODIUM 100 MG: 100 CAPSULE, LIQUID FILLED ORAL at 09:01

## 2019-06-25 ASSESSMENT — PAIN DESCRIPTION - INTENSITY: RATING_2: 0

## 2019-06-25 ASSESSMENT — PAIN SCALES - GENERAL
PAINLEVEL_OUTOF10: 5
PAINLEVEL_OUTOF10: 0

## 2019-06-25 NOTE — PROGRESS NOTES
Pt requesting to hold baby she gave birth to for surrogate family. Called OB, spoke with Braeden Wayne, she states that surrogate father is not here at this time but she will ask him as soon as he returns and call writer with answer.  Await

## 2019-06-25 NOTE — ANESTHESIA POSTPROCEDURE EVALUATION
Department of Anesthesiology  Postprocedure Note    Patient: Jt Griffin  MRN: [id number]  YOB: 1990  Date of evaluation: 6/25/2019  Time:  2:27 PM     Procedure Summary     Date:  06/24/19 Room / Location:      Anesthesia Start:  0240 Anesthesia Stop:  1010    Procedure:  Labor Analgesia Diagnosis:      Scheduled Providers:   Responsible Provider:  FÁTIMA Ortiz CRNA    Anesthesia Type:  epidural ASA Status:  2          Anesthesia Type: epidural    Yana Phase I:      Yana Phase II:      Last vitals: Reviewed and per EMR flowsheets.        Anesthesia Post Evaluation    Patient location during evaluation: floor  Patient participation: complete - patient participated  Level of consciousness: awake  Airway patency: patent  Nausea & Vomiting: no nausea and no vomiting  Complications: no  Cardiovascular status: blood pressure returned to baseline  Respiratory status: acceptable  Hydration status: euvolemic  Comments: Pt denies headache, sensory and motor function returned to baseline

## 2019-06-25 NOTE — PROGRESS NOTES
Pt taken to OB dept to visit  and surrogate parents in room 56, per surrogate fathers permission. OB will bring pt back to room 330 when visit complete.

## 2019-06-25 NOTE — PROGRESS NOTES
Nutrition Assessment (Low Risk)    Type and Reason for Visit: Initial    Nutrition Recommendations: continue current diet     Nutrition Assessment:  Patient assessed for nutritional risk. Deemed to be at low risk at this time. Will continue to monitor for changes in status. No significant PES.      Malnutrition Assessment:  · Malnutrition Status: Insufficient data    Nutrition Risk Level   Risk Level: Low    Nutrition Diagnosis:   · Problem: No nutrition diagnosis at this time    Nutrition Intervention:  Food and/or Delivery: Continue current diet  Nutrition Education/Counseling/Coordination of Care:  Continued Inpatient Monitoring, Education Not Indicated, Coordination of Care      Electronically signed by Andrew Antonio RD, BRAXTON on 6/25/19 at 9:04 AM    Contact Number: [phone]

## 2019-06-25 NOTE — DISCHARGE INSTR - DIET

## 2019-06-25 NOTE — PLAN OF CARE
Problem: Discharge Planning:  Goal: Discharged to appropriate level of care  Description  Discharged to appropriate level of care  Outcome: Ongoing  Note:   Plan is for patient to return home on discharge. Will continue to monitor. Problem: Constipation:  Goal: Bowel elimination is within specified parameters  Description  Bowel elimination is within specified parameters  Outcome: Ongoing  Note:   Patient has not been able to pass gas since delivery. Bowel sounds are hypoactive in all full quadrants. Colace is ordered 2 times daily. Will continue to monitor. Problem: Fluid Volume - Imbalance:  Goal: Absence of imbalanced fluid volume signs and symptoms  Description  Absence of imbalanced fluid volume signs and symptoms  Outcome: Ongoing     Problem: Mood - Altered:  Goal: Mood stable  Description  Mood stable  Outcome: Ongoing     Problem: Pain - Acute:  Goal: Pain level will decrease  Description  Pain level will decrease  Outcome: Ongoing  Note:   Assess patients pain every four hours and as needed using the 1-10 pain scale. Patient encouraged to reposition every two hours and as needed. Medications as prescribed by physician. Alternatives to pain medications provided as tolerated. Will continue to monitor. Problem: Pain:  Goal: Pain level will decrease  Description  Pain level will decrease  Outcome: Ongoing  Note:   Assess patients pain every four hours and as needed using the 1-10 pain scale. Patient encouraged to reposition every two hours and as needed. Medications as prescribed by physician. Alternatives to pain medications provided as tolerated. Will continue to monitor.       Problem: Pain:  Goal: Control of acute pain  Description  Control of acute pain  Outcome: Ongoing

## 2019-06-25 NOTE — PROGRESS NOTES
Patient complains of sever pain that is not being controlled with pain medicine that was given and declines tylenol at this time. Patient asks writer to call Dr. Raven Mcrae to see if willing to give anything different for pain control. Writer called Dr. Raven Mcrae at this time. New order for Percocet 5-325 two tablets once and continue to assess.

## 2019-06-25 NOTE — PROGRESS NOTES
Received call from [place] nurse, Flores Pat, JAELYN, she states that surrogate father of baby has returned and agrees to have pt visit the baby at approx 1300 today. Pt d/c order in and pt will be taken to [place] dept once she is d/c'd from unit. Pt aware.

## 2019-06-25 NOTE — DISCHARGE SUMMARY
Obstetrical Discharge Form  Dr. Jerry Bueno    Gestational Age:38w6d    Antepartum complications: Hydramnios. Light Meconium AF Anemia of pregnancy    Date of Delivery: 2019      Type of Delivery: vaginal, spontaneous    Delivered By:  INDIANA Camacho:   Information for the patient's :  Dana Kaminski [[id number]]        Anesthesia: Epidural    Intrapartum complications: None    Feeding method: bottle - Enfamil    Postpartum complications: none    Discharge Medication:    Michael Alonso   Home Medication Instructions ADR:[id number]    Printed on:19 1220   Medication Information                      omeprazole (PRILOSEC) 20 MG delayed release capsule  Take 1 capsule by mouth daily             oxyCODONE-acetaminophen (PERCOCET) 5-325 MG per tablet  Take 2 tablets by mouth every 8 hours as needed for Pain for up to 3 days. Intended supply: 3 days.  Take lowest dose possible to manage pain                Condition: Stable  Disposition: Home  Discharge Date: today     Plan:   Follow up in 6 week(s)

## 2019-06-26 LAB — SURGICAL PATHOLOGY REPORT: NORMAL

## 2019-07-13 ENCOUNTER — HOSPITAL ENCOUNTER (EMERGENCY)
Age: 29
Discharge: HOME | DRG: 532 | End: 2019-07-14
Attending: EMERGENCY MEDICINE
Payer: COMMERCIAL

## 2019-07-13 VITALS
SYSTOLIC BLOOD PRESSURE: 123 MMHG | DIASTOLIC BLOOD PRESSURE: 91 MMHG | OXYGEN SATURATION: 98 % | RESPIRATION RATE: 14 BRPM | HEART RATE: 87 BPM | TEMPERATURE: 97.3 F

## 2019-07-13 DIAGNOSIS — N90.89 HEMATOMA OF LABIA MAJORA: Primary | ICD-10-CM

## 2019-07-13 PROCEDURE — 99282 EMERGENCY DEPT VISIT SF MDM: CPT

## 2019-07-13 RX ORDER — LIDOCAINE HYDROCHLORIDE 10 MG/ML
INJECTION, SOLUTION INFILTRATION; PERINEURAL
Status: DISCONTINUED
Start: 2019-07-13 | End: 2019-07-14 | Stop reason: HOSPADM

## 2019-07-13 RX ORDER — ACETAMINOPHEN 500 MG
1000 TABLET ORAL EVERY 6 HOURS PRN
Status: ON HOLD | COMMUNITY
End: 2021-05-06 | Stop reason: HOSPADM

## 2019-07-13 RX ORDER — LIDOCAINE HYDROCHLORIDE 10 MG/ML
10 INJECTION, SOLUTION EPIDURAL; INFILTRATION; INTRACAUDAL; PERINEURAL ONCE
Status: DISCONTINUED | OUTPATIENT
Start: 2019-07-13 | End: 2019-07-14 | Stop reason: HOSPADM

## 2019-07-13 RX ORDER — IBUPROFEN 800 MG/1
800 TABLET ORAL EVERY 6 HOURS PRN
COMMUNITY
End: 2020-09-30 | Stop reason: ALTCHOICE

## 2019-07-13 ASSESSMENT — PAIN DESCRIPTION - PAIN TYPE: TYPE: ACUTE PAIN

## 2019-07-13 ASSESSMENT — PAIN DESCRIPTION - DESCRIPTORS: DESCRIPTORS: THROBBING

## 2019-07-13 ASSESSMENT — PAIN SCALES - GENERAL: PAINLEVEL_OUTOF10: 6

## 2019-07-13 ASSESSMENT — PAIN DESCRIPTION - LOCATION: LOCATION: LABIA

## 2019-07-13 ASSESSMENT — PAIN DESCRIPTION - FREQUENCY: FREQUENCY: CONTINUOUS

## 2019-07-14 ENCOUNTER — HOSPITAL ENCOUNTER (INPATIENT)
Age: 29
LOS: 1 days | Discharge: ANOTHER HOSPITAL | DRG: 532 | End: 2019-07-16
Attending: EMERGENCY MEDICINE | Admitting: OBSTETRICS & GYNECOLOGY
Payer: COMMERCIAL

## 2019-07-14 ENCOUNTER — APPOINTMENT (OUTPATIENT)
Dept: CT IMAGING | Age: 29
DRG: 532 | End: 2019-07-14
Payer: COMMERCIAL

## 2019-07-14 DIAGNOSIS — N76.0 ABSCESS OF VAGINA: ICD-10-CM

## 2019-07-14 DIAGNOSIS — R50.9 FEVER, UNSPECIFIED FEVER CAUSE: Primary | ICD-10-CM

## 2019-07-14 PROBLEM — N76.4 LABIAL ABSCESS: Status: ACTIVE | Noted: 2019-07-14

## 2019-07-14 LAB
ABSOLUTE EOS #: 0 K/UL (ref 0–0.44)
ABSOLUTE IMMATURE GRANULOCYTE: 0 K/UL (ref 0–0.3)
ABSOLUTE LYMPH #: 0.86 K/UL (ref 1.1–3.7)
ABSOLUTE MONO #: 0.32 K/UL (ref 0.1–1.2)
ALBUMIN SERPL-MCNC: 4.2 G/DL (ref 3.5–5.2)
ALBUMIN/GLOBULIN RATIO: 1.2 (ref 1–2.5)
ALP BLD-CCNC: 105 U/L (ref 35–104)
ALT SERPL-CCNC: 21 U/L (ref 5–33)
ANION GAP SERPL CALCULATED.3IONS-SCNC: 14 MMOL/L (ref 9–17)
AST SERPL-CCNC: 32 U/L
BASOPHILS # BLD: 0 % (ref 0–2)
BASOPHILS ABSOLUTE: 0 K/UL (ref 0–0.2)
BILIRUB SERPL-MCNC: 0.45 MG/DL (ref 0.3–1.2)
BUN BLDV-MCNC: 13 MG/DL (ref 6–20)
BUN/CREAT BLD: 13 (ref 9–20)
CALCIUM SERPL-MCNC: 9.3 MG/DL (ref 8.6–10.4)
CHLORIDE BLD-SCNC: 103 MMOL/L (ref 98–107)
CO2: 23 MMOL/L (ref 20–31)
CREAT SERPL-MCNC: 1.02 MG/DL (ref 0.5–0.9)
DIFFERENTIAL TYPE: ABNORMAL
EOSINOPHILS RELATIVE PERCENT: 0 % (ref 1–4)
GFR AFRICAN AMERICAN: >60 ML/MIN
GFR NON-AFRICAN AMERICAN: >60 ML/MIN
GFR SERPL CREATININE-BSD FRML MDRD: ABNORMAL ML/MIN/{1.73_M2}
GFR SERPL CREATININE-BSD FRML MDRD: ABNORMAL ML/MIN/{1.73_M2}
GLUCOSE BLD-MCNC: 96 MG/DL (ref 70–99)
HCT VFR BLD CALC: 38.7 % (ref 36.3–47.1)
HEMOGLOBIN: 11.5 G/DL (ref 11.9–15.1)
IMMATURE GRANULOCYTES: 0 %
LACTIC ACID, WHOLE BLOOD: NORMAL MMOL/L (ref 0.7–2.1)
LACTIC ACID: 1 MMOL/L (ref 0.5–2.2)
LYMPHOCYTES # BLD: 8 % (ref 24–43)
MCH RBC QN AUTO: 24.4 PG (ref 25.2–33.5)
MCHC RBC AUTO-ENTMCNC: 29.7 G/DL (ref 28.4–34.8)
MCV RBC AUTO: 82.2 FL (ref 82.6–102.9)
MONOCYTES # BLD: 3 % (ref 3–12)
MORPHOLOGY: ABNORMAL
NRBC AUTOMATED: 0 PER 100 WBC
PDW BLD-RTO: 17.1 % (ref 11.8–14.4)
PLATELET # BLD: 253 K/UL (ref 138–453)
PLATELET ESTIMATE: ABNORMAL
PMV BLD AUTO: 10.4 FL (ref 8.1–13.5)
POTASSIUM SERPL-SCNC: 3.6 MMOL/L (ref 3.7–5.3)
RBC # BLD: 4.71 M/UL (ref 3.95–5.11)
RBC # BLD: ABNORMAL 10*6/UL
SEG NEUTROPHILS: 89 % (ref 36–65)
SEGMENTED NEUTROPHILS ABSOLUTE COUNT: 9.62 K/UL (ref 1.5–8.1)
SODIUM BLD-SCNC: 140 MMOL/L (ref 135–144)
TOTAL PROTEIN: 7.6 G/DL (ref 6.4–8.3)
WBC # BLD: 10.8 K/UL (ref 3.5–11.3)
WBC # BLD: ABNORMAL 10*3/UL

## 2019-07-14 PROCEDURE — 80053 COMPREHEN METABOLIC PANEL: CPT

## 2019-07-14 PROCEDURE — 10060 I&D ABSCESS SIMPLE/SINGLE: CPT

## 2019-07-14 PROCEDURE — 74177 CT ABD & PELVIS W/CONTRAST: CPT

## 2019-07-14 PROCEDURE — 6360000004 HC RX CONTRAST MEDICATION: Performed by: EMERGENCY MEDICINE

## 2019-07-14 PROCEDURE — 99283 EMERGENCY DEPT VISIT LOW MDM: CPT

## 2019-07-14 PROCEDURE — 2580000003 HC RX 258: Performed by: EMERGENCY MEDICINE

## 2019-07-14 PROCEDURE — 83605 ASSAY OF LACTIC ACID: CPT

## 2019-07-14 PROCEDURE — 6360000002 HC RX W HCPCS: Performed by: EMERGENCY MEDICINE

## 2019-07-14 PROCEDURE — 6370000000 HC RX 637 (ALT 250 FOR IP): Performed by: EMERGENCY MEDICINE

## 2019-07-14 PROCEDURE — 81001 URINALYSIS AUTO W/SCOPE: CPT

## 2019-07-14 PROCEDURE — 85025 COMPLETE CBC W/AUTO DIFF WBC: CPT

## 2019-07-14 RX ORDER — KETOROLAC TROMETHAMINE 30 MG/ML
30 INJECTION, SOLUTION INTRAMUSCULAR; INTRAVENOUS ONCE
Status: COMPLETED | OUTPATIENT
Start: 2019-07-14 | End: 2019-07-14

## 2019-07-14 RX ORDER — CLINDAMYCIN PHOSPHATE 900 MG/50ML
900 INJECTION INTRAVENOUS ONCE
Status: DISCONTINUED | OUTPATIENT
Start: 2019-07-14 | End: 2019-07-15 | Stop reason: SDUPTHER

## 2019-07-14 RX ORDER — PROMETHAZINE HYDROCHLORIDE 25 MG/ML
12.5 INJECTION, SOLUTION INTRAMUSCULAR; INTRAVENOUS ONCE
Status: COMPLETED | OUTPATIENT
Start: 2019-07-14 | End: 2019-07-14

## 2019-07-14 RX ORDER — MORPHINE SULFATE 4 MG/ML
4 INJECTION, SOLUTION INTRAMUSCULAR; INTRAVENOUS ONCE
Status: COMPLETED | OUTPATIENT
Start: 2019-07-14 | End: 2019-07-14

## 2019-07-14 RX ORDER — 0.9 % SODIUM CHLORIDE 0.9 %
1000 INTRAVENOUS SOLUTION INTRAVENOUS ONCE
Status: COMPLETED | OUTPATIENT
Start: 2019-07-14 | End: 2019-07-14

## 2019-07-14 RX ORDER — OXYCODONE HYDROCHLORIDE AND ACETAMINOPHEN 5; 325 MG/1; MG/1
1 TABLET ORAL EVERY 6 HOURS PRN
Qty: 12 TABLET | Refills: 0 | Status: SHIPPED | OUTPATIENT
Start: 2019-07-14 | End: 2019-07-17

## 2019-07-14 RX ORDER — ACETAMINOPHEN 500 MG
1000 TABLET ORAL ONCE
Status: COMPLETED | OUTPATIENT
Start: 2019-07-14 | End: 2019-07-14

## 2019-07-14 RX ADMIN — IOPAMIDOL 75 ML: 755 INJECTION, SOLUTION INTRAVENOUS at 21:32

## 2019-07-14 RX ADMIN — ACETAMINOPHEN 1000 MG: 500 TABLET, FILM COATED ORAL at 22:52

## 2019-07-14 RX ADMIN — PROMETHAZINE HYDROCHLORIDE 12.5 MG: 25 INJECTION, SOLUTION INTRAMUSCULAR; INTRAVENOUS at 21:14

## 2019-07-14 RX ADMIN — SODIUM CHLORIDE 1000 ML: 9 INJECTION, SOLUTION INTRAVENOUS at 21:13

## 2019-07-14 RX ADMIN — KETOROLAC TROMETHAMINE 30 MG: 30 INJECTION, SOLUTION INTRAMUSCULAR at 21:14

## 2019-07-14 RX ADMIN — MORPHINE SULFATE 4 MG: 4 INJECTION, SOLUTION INTRAMUSCULAR; INTRAVENOUS at 21:52

## 2019-07-14 ASSESSMENT — ENCOUNTER SYMPTOMS
DIARRHEA: 0
COLOR CHANGE: 1
ABDOMINAL PAIN: 0
COUGH: 0
ABDOMINAL PAIN: 1
NAUSEA: 1
NAUSEA: 0
SHORTNESS OF BREATH: 0
SORE THROAT: 0
VOMITING: 0
COLOR CHANGE: 1
COUGH: 0
SHORTNESS OF BREATH: 0
BACK PAIN: 0
SORE THROAT: 0
VOMITING: 1

## 2019-07-14 ASSESSMENT — PAIN SCALES - GENERAL
PAINLEVEL_OUTOF10: 9
PAINLEVEL_OUTOF10: 5
PAINLEVEL_OUTOF10: 9
PAINLEVEL_OUTOF10: 9
PAINLEVEL_OUTOF10: 5

## 2019-07-14 ASSESSMENT — PAIN DESCRIPTION - PAIN TYPE: TYPE: ACUTE PAIN

## 2019-07-14 ASSESSMENT — PAIN DESCRIPTION - LOCATION: LOCATION: GENERALIZED

## 2019-07-15 LAB
-: ABNORMAL
ABSOLUTE EOS #: <0.03 K/UL (ref 0–0.44)
ABSOLUTE IMMATURE GRANULOCYTE: <0.03 K/UL (ref 0–0.3)
ABSOLUTE LYMPH #: 0.73 K/UL (ref 1.1–3.7)
ABSOLUTE MONO #: 0.21 K/UL (ref 0.1–1.2)
AMORPHOUS: ABNORMAL
BACTERIA: ABNORMAL
BASOPHILS # BLD: 0 % (ref 0–2)
BASOPHILS ABSOLUTE: <0.03 K/UL (ref 0–0.2)
BILIRUBIN URINE: NEGATIVE
CASTS UA: ABNORMAL /LPF
COLOR: YELLOW
COMMENT UA: ABNORMAL
CRYSTALS, UA: ABNORMAL /HPF
DIFFERENTIAL TYPE: ABNORMAL
EOSINOPHILS RELATIVE PERCENT: 0 % (ref 1–4)
EPITHELIAL CELLS UA: ABNORMAL /HPF (ref 0–25)
GLUCOSE URINE: NEGATIVE
HCT VFR BLD CALC: 37.5 % (ref 36.3–47.1)
HEMOGLOBIN: 10.9 G/DL (ref 11.9–15.1)
IMMATURE GRANULOCYTES: 0 %
KETONES, URINE: ABNORMAL
LEUKOCYTE ESTERASE, URINE: NEGATIVE
LYMPHOCYTES # BLD: 15 % (ref 24–43)
MCH RBC QN AUTO: 24.4 PG (ref 25.2–33.5)
MCHC RBC AUTO-ENTMCNC: 29.1 G/DL (ref 28.4–34.8)
MCV RBC AUTO: 84.1 FL (ref 82.6–102.9)
MONOCYTES # BLD: 4 % (ref 3–12)
MUCUS: ABNORMAL
NITRITE, URINE: NEGATIVE
NRBC AUTOMATED: 0 PER 100 WBC
OTHER OBSERVATIONS UA: ABNORMAL
PDW BLD-RTO: 17.4 % (ref 11.8–14.4)
PH UA: 7 (ref 5–9)
PLATELET # BLD: 185 K/UL (ref 138–453)
PLATELET ESTIMATE: ABNORMAL
PMV BLD AUTO: 9.6 FL (ref 8.1–13.5)
PROTEIN UA: NEGATIVE
RBC # BLD: 4.46 M/UL (ref 3.95–5.11)
RBC # BLD: ABNORMAL 10*6/UL
RBC UA: ABNORMAL /HPF (ref 0–2)
RENAL EPITHELIAL, UA: ABNORMAL /HPF
SEG NEUTROPHILS: 80 % (ref 36–65)
SEGMENTED NEUTROPHILS ABSOLUTE COUNT: 3.98 K/UL (ref 1.5–8.1)
SPECIFIC GRAVITY UA: 1.01 (ref 1.01–1.02)
TRICHOMONAS: ABNORMAL
TURBIDITY: CLEAR
URINE HGB: ABNORMAL
UROBILINOGEN, URINE: NORMAL
WBC # BLD: 5 K/UL (ref 3.5–11.3)
WBC # BLD: ABNORMAL 10*3/UL
WBC UA: ABNORMAL /HPF (ref 0–5)
YEAST: ABNORMAL

## 2019-07-15 PROCEDURE — 2500000003 HC RX 250 WO HCPCS: Performed by: OBSTETRICS & GYNECOLOGY

## 2019-07-15 PROCEDURE — 6370000000 HC RX 637 (ALT 250 FOR IP): Performed by: ADVANCED PRACTICE MIDWIFE

## 2019-07-15 PROCEDURE — 81001 URINALYSIS AUTO W/SCOPE: CPT

## 2019-07-15 PROCEDURE — 6370000000 HC RX 637 (ALT 250 FOR IP): Performed by: OBSTETRICS & GYNECOLOGY

## 2019-07-15 PROCEDURE — 36415 COLL VENOUS BLD VENIPUNCTURE: CPT

## 2019-07-15 PROCEDURE — 1200000000 HC SEMI PRIVATE

## 2019-07-15 PROCEDURE — 85025 COMPLETE CBC W/AUTO DIFF WBC: CPT

## 2019-07-15 RX ORDER — OXYCODONE HYDROCHLORIDE AND ACETAMINOPHEN 5; 325 MG/1; MG/1
1 TABLET ORAL EVERY 4 HOURS PRN
Status: DISCONTINUED | OUTPATIENT
Start: 2019-07-15 | End: 2019-07-16 | Stop reason: HOSPADM

## 2019-07-15 RX ORDER — HYDROXYZINE HYDROCHLORIDE 25 MG/ML
25 INJECTION, SOLUTION INTRAMUSCULAR NIGHTLY PRN
Status: DISCONTINUED | OUTPATIENT
Start: 2019-07-15 | End: 2019-07-16 | Stop reason: HOSPADM

## 2019-07-15 RX ORDER — IBUPROFEN 800 MG/1
800 TABLET ORAL EVERY 8 HOURS PRN
Status: DISCONTINUED | OUTPATIENT
Start: 2019-07-15 | End: 2019-07-16 | Stop reason: HOSPADM

## 2019-07-15 RX ORDER — HYDROCODONE BITARTRATE AND ACETAMINOPHEN 5; 325 MG/1; MG/1
2 TABLET ORAL EVERY 4 HOURS PRN
Status: DISCONTINUED | OUTPATIENT
Start: 2019-07-15 | End: 2019-07-15

## 2019-07-15 RX ORDER — CLINDAMYCIN PHOSPHATE 600 MG/50ML
600 INJECTION INTRAVENOUS EVERY 6 HOURS
Status: DISCONTINUED | OUTPATIENT
Start: 2019-07-15 | End: 2019-07-16 | Stop reason: HOSPADM

## 2019-07-15 RX ORDER — ONDANSETRON 4 MG/1
4 TABLET, ORALLY DISINTEGRATING ORAL EVERY 6 HOURS PRN
Status: DISCONTINUED | OUTPATIENT
Start: 2019-07-15 | End: 2019-07-16

## 2019-07-15 RX ADMIN — IBUPROFEN 800 MG: 800 TABLET, FILM COATED ORAL at 23:14

## 2019-07-15 RX ADMIN — OXYCODONE HYDROCHLORIDE AND ACETAMINOPHEN 1 TABLET: 5; 325 TABLET ORAL at 06:41

## 2019-07-15 RX ADMIN — CLINDAMYCIN PHOSPHATE 600 MG: 600 INJECTION, SOLUTION INTRAVENOUS at 00:35

## 2019-07-15 RX ADMIN — CLINDAMYCIN PHOSPHATE 600 MG: 600 INJECTION, SOLUTION INTRAVENOUS at 11:35

## 2019-07-15 RX ADMIN — ONDANSETRON 4 MG: 4 TABLET, ORALLY DISINTEGRATING ORAL at 22:56

## 2019-07-15 RX ADMIN — OXYCODONE HYDROCHLORIDE AND ACETAMINOPHEN 1 TABLET: 5; 325 TABLET ORAL at 16:06

## 2019-07-15 RX ADMIN — ONDANSETRON 4 MG: 4 TABLET, ORALLY DISINTEGRATING ORAL at 10:28

## 2019-07-15 RX ADMIN — OXYCODONE HYDROCHLORIDE AND ACETAMINOPHEN 1 TABLET: 5; 325 TABLET ORAL at 02:25

## 2019-07-15 RX ADMIN — CLINDAMYCIN PHOSPHATE 600 MG: 600 INJECTION, SOLUTION INTRAVENOUS at 06:34

## 2019-07-15 RX ADMIN — CLINDAMYCIN PHOSPHATE 600 MG: 600 INJECTION, SOLUTION INTRAVENOUS at 17:36

## 2019-07-15 RX ADMIN — OXYCODONE HYDROCHLORIDE AND ACETAMINOPHEN 1 TABLET: 5; 325 TABLET ORAL at 20:45

## 2019-07-15 RX ADMIN — IBUPROFEN 800 MG: 800 TABLET, FILM COATED ORAL at 13:07

## 2019-07-15 RX ADMIN — OXYCODONE HYDROCHLORIDE AND ACETAMINOPHEN 1 TABLET: 5; 325 TABLET ORAL at 11:35

## 2019-07-15 ASSESSMENT — PAIN SCALES - GENERAL
PAINLEVEL_OUTOF10: 9
PAINLEVEL_OUTOF10: 8
PAINLEVEL_OUTOF10: 8
PAINLEVEL_OUTOF10: 10
PAINLEVEL_OUTOF10: 5
PAINLEVEL_OUTOF10: 7
PAINLEVEL_OUTOF10: 10
PAINLEVEL_OUTOF10: 6
PAINLEVEL_OUTOF10: 7

## 2019-07-15 ASSESSMENT — PAIN DESCRIPTION - FREQUENCY
FREQUENCY: CONTINUOUS

## 2019-07-15 ASSESSMENT — PAIN DESCRIPTION - LOCATION
LOCATION: VAGINA
LOCATION: LABIA

## 2019-07-15 ASSESSMENT — PAIN DESCRIPTION - ORIENTATION
ORIENTATION: LOWER
ORIENTATION: LOWER

## 2019-07-15 ASSESSMENT — PAIN DESCRIPTION - ONSET
ONSET: GRADUAL
ONSET: ON-GOING
ONSET: GRADUAL

## 2019-07-15 ASSESSMENT — PAIN DESCRIPTION - DIRECTION: RADIATING_TOWARDS: RT BUTTOCKS

## 2019-07-15 ASSESSMENT — PAIN DESCRIPTION - DESCRIPTORS
DESCRIPTORS: BURNING;THROBBING
DESCRIPTORS: BURNING

## 2019-07-15 ASSESSMENT — PAIN DESCRIPTION - PAIN TYPE
TYPE: ACUTE PAIN

## 2019-07-15 NOTE — ED PROVIDER NOTES
All other components within normal limits   LACTIC ACID, PLASMA   URINALYSIS WITH MICROSCOPIC        All other labs were within normal range or not returned as of this dictation. EMERGENCY DEPARTMENT COURSE and DIFFERENTIAL DIAGNOSIS/MDM:   Vitals:    Vitals:    07/14/19 2253 07/14/19 2301 07/14/19 2316 07/14/19 2331   BP:  99/70 99/70 99/73   Pulse: 114      Resp:       Temp:       TempSrc:       SpO2:  95% 95% 96%   Weight:       Height:           Medications   clindamycin (CLEOCIN) 900 mg in dextrose 5 % 50 mL IVPB (900 mg Intravenous Not Given 7/14/19 2338)   0.9 % sodium chloride bolus (0 mLs Intravenous Stopped 7/14/19 2239)   promethazine (PHENERGAN) injection 12.5 mg (12.5 mg Intravenous Given 7/14/19 2114)   ketorolac (TORADOL) injection 30 mg (30 mg Intravenous Given 7/14/19 2114)   iopamidol (ISOVUE-370) 76 % injection 75 mL (75 mLs Intravenous Given 7/14/19 2132)   morphine injection 4 mg (4 mg Intravenous Given 7/14/19 2152)   acetaminophen (TYLENOL) tablet 1,000 mg (1,000 mg Oral Given 7/14/19 2252)       MDM. Patient arrived febrile and was tachycardic secondary to this. Her abdomen was soft and nonsurgical but with now the progression of symptoms from the day prior basic labs and a CT scan were obtained. Labs showed no leukocytosis or bandemia lactic acid was also normal.  CT scan showed no acute changes within the abdomen but it did suggest a possible abscess in the vagina. At this time with the worsening symptoms and fever presentation the case was discussed with OB/GYN. They recommend IV antibiotics and placement at this time so they can evaluate the patient in the morning and decide if surgical intervention is needed. REVAL:         CRITICAL CARE TIME   Total Critical Care time was minutes, excluding separately reportable procedures. There was a high probability of clinically significant/life threatening deterioration in the patient's condition which required my urgent intervention.

## 2019-07-15 NOTE — PROGRESS NOTES
Department of Obstetrics and Gynecology  Labor and Delivery  Adarsh Alexander CN Outpatient Note      SUBJECTIVE:  Patient was observed last night from her second ED visit for labial hematoma/ abscess, is still \"painful\" and wearing ice pack, has had 2 iv doses of clindamycin, was febrile last pm and temp is now 98.7 with ibuprofen and acetaminophen being given during the night. Patient was tachy to 127 in ED but now pulse of 92. WBC this am of 5,000. No other urinary or breast signs of infection or any aside respiratory or GI complaints. OBJECTIVE: see hx of febrile and tachycardic in ED, now normalizing. HISTORY OF PRESENT ILLNESS:  Pt was seen in office last week and 2 times in ED for 3 week postpartum vulvar lump/ infection. Had history of vaginal laceration with repair at delivery. The patient is a 33 y.o. female at 38w7d. OB History        4    Para   4    Term   4            AB        Living   4       SAB        TAB        Ectopic        Molar        Multiple   0    Live Births   4            Patient presents with a chief complaint vulvar pain and fever        Past Medical History:   Diagnosis Date    Anemia     GERD (gastroesophageal reflux disease)     Kidney stones     Postpartum depression        Vitals:  /73   Pulse 92   Temp 98.7 °F (37.1 °C) (Temporal)   Resp 16   Ht 5' 3\" (1.6 m)   Wt 145 lb 11.2 oz (66.1 kg)   LMP 2018   SpO2 98%   BMI 25.81 kg/m²     CONSTITUTIONAL:  awake, alert, cooperative, no apparent distress, and appears stated age  ABDOMEN:  No scars, normal bowel sounds, soft, non-distended, non-tender, no masses palpated, no hepatosplenomegally  GENITAL/URINARY:  Vulvar pain \"clitoris hurts\"  NEUROLOGIC:  Awake, alert, oriented to name, place and time. gait is guarded.     Pelvic exam: VULVA: normal labia bilaterally, supraclitoral thumbsize cystlike \"ball\" that is very tender although only slightly reddened, nothing draining, VAGINA: sight of apparent laceration appears well healed without signs of infection.         DATA:  Results for orders placed or performed during the hospital encounter of 07/14/19   CBC auto differential   Result Value Ref Range    WBC 10.8 3.5 - 11.3 k/uL    RBC 4.71 3.95 - 5.11 m/uL    Hemoglobin 11.5 (L) 11.9 - 15.1 g/dL    Hematocrit 38.7 36.3 - 47.1 %    MCV 82.2 (L) 82.6 - 102.9 fL    MCH 24.4 (L) 25.2 - 33.5 pg    MCHC 29.7 28.4 - 34.8 g/dL    RDW 17.1 (H) 11.8 - 14.4 %    Platelets 072 130 - 870 k/uL    MPV 10.4 8.1 - 13.5 fL    NRBC Automated 0.0 0.0 per 100 WBC    Differential Type NOT REPORTED     WBC Morphology NOT REPORTED     RBC Morphology NOT REPORTED     Platelet Estimate NOT REPORTED     Seg Neutrophils 89 (H) 36 - 65 %    Lymphocytes 8 (L) 24 - 43 %    Monocytes 3 3 - 12 %    Eosinophils % 0 (L) 1 - 4 %    Immature Granulocytes 0 0 %    Basophils 0 0 - 2 %    Segs Absolute 9.62 (H) 1.50 - 8.10 k/uL    Absolute Lymph # 0.86 (L) 1.10 - 3.70 k/uL    Absolute Mono # 0.32 0.10 - 1.20 k/uL    Absolute Eos # 0.00 0.00 - 0.44 k/uL    Absolute Immature Granulocyte 0.00 0.00 - 0.30 k/uL    Basophils # 0.00 0.0 - 0.2 k/uL    Morphology Large platelets noted    Comprehensive metabolic panel   Result Value Ref Range    Glucose 96 70 - 99 mg/dL    BUN 13 6 - 20 mg/dL    CREATININE 1.02 (H) 0.50 - 0.90 mg/dL    Bun/Cre Ratio 13 9 - 20    Calcium 9.3 8.6 - 10.4 mg/dL    Sodium 140 135 - 144 mmol/L    Potassium 3.6 (L) 3.7 - 5.3 mmol/L    Chloride 103 98 - 107 mmol/L    CO2 23 20 - 31 mmol/L    Anion Gap 14 9 - 17 mmol/L    Alkaline Phosphatase 105 (H) 35 - 104 U/L    ALT 21 5 - 33 U/L    AST 32 (H) <32 U/L    Total Bilirubin 0.45 0.3 - 1.2 mg/dL    Total Protein 7.6 6.4 - 8.3 g/dL    Alb 4.2 3.5 - 5.2 g/dL    Albumin/Globulin Ratio 1.2 1.0 - 2.5    GFR Non-African American >60 >60 mL/min    GFR African American >60 >60 mL/min    GFR Comment          GFR Staging         Lactic acid, plasma   Result Value Ref Range    Lactic Acid

## 2019-07-15 NOTE — ED NOTES
Patient has abscess/hematoma to labia/clitoral zheng which was opened yesterday in the ER. Area is noted to bed swollen and tender.        Maribel Guzman RN  07/14/19 [id number]

## 2019-07-15 NOTE — PROGRESS NOTES
Discussed discharge plans with the patient. Patient is a 33year old female here with supraclitoral abscess. She is alert and oriented. Patient is single and lives with her fiance and children. She has just given birth 2 weeks ago. Patient uses no medical equipment. Patient does the cooking and the cleaning. She manages her own medication and drives    Her PCP is Sandrine Phelps CNP. She has medical insurance that helps with medication cost.    The discharge plan is home with no services. She does not have advance directives. SW to monitor and assist with any needs or concerns as they arise.       NURYS Gregg

## 2019-07-16 VITALS
WEIGHT: 143 LBS | TEMPERATURE: 97.6 F | BODY MASS INDEX: 25.34 KG/M2 | RESPIRATION RATE: 16 BRPM | HEART RATE: 78 BPM | HEIGHT: 63 IN | SYSTOLIC BLOOD PRESSURE: 103 MMHG | DIASTOLIC BLOOD PRESSURE: 63 MMHG | OXYGEN SATURATION: 97 %

## 2019-07-16 LAB
ABSOLUTE EOS #: 0.15 K/UL (ref 0–0.44)
ABSOLUTE IMMATURE GRANULOCYTE: <0.03 K/UL (ref 0–0.3)
ABSOLUTE LYMPH #: 1.97 K/UL (ref 1.1–3.7)
ABSOLUTE MONO #: 0.37 K/UL (ref 0.1–1.2)
ANION GAP SERPL CALCULATED.3IONS-SCNC: 9 MMOL/L (ref 9–17)
BASOPHILS # BLD: 1 % (ref 0–2)
BASOPHILS ABSOLUTE: 0.04 K/UL (ref 0–0.2)
BUN BLDV-MCNC: 14 MG/DL (ref 6–20)
BUN/CREAT BLD: 13 (ref 9–20)
CALCIUM SERPL-MCNC: 8.7 MG/DL (ref 8.6–10.4)
CHLORIDE BLD-SCNC: 103 MMOL/L (ref 98–107)
CO2: 26 MMOL/L (ref 20–31)
CREAT SERPL-MCNC: 1.07 MG/DL (ref 0.5–0.9)
DIFFERENTIAL TYPE: ABNORMAL
EOSINOPHILS RELATIVE PERCENT: 3 % (ref 1–4)
GFR AFRICAN AMERICAN: >60 ML/MIN
GFR NON-AFRICAN AMERICAN: >60 ML/MIN
GFR SERPL CREATININE-BSD FRML MDRD: ABNORMAL ML/MIN/{1.73_M2}
GFR SERPL CREATININE-BSD FRML MDRD: ABNORMAL ML/MIN/{1.73_M2}
GLUCOSE BLD-MCNC: 98 MG/DL (ref 70–99)
HCT VFR BLD CALC: 37.5 % (ref 36.3–47.1)
HEMOGLOBIN: 11.4 G/DL (ref 11.9–15.1)
IMMATURE GRANULOCYTES: 0 %
LYMPHOCYTES # BLD: 34 % (ref 24–43)
MCH RBC QN AUTO: 24.8 PG (ref 25.2–33.5)
MCHC RBC AUTO-ENTMCNC: 30.4 G/DL (ref 28.4–34.8)
MCV RBC AUTO: 81.7 FL (ref 82.6–102.9)
MONOCYTES # BLD: 6 % (ref 3–12)
NRBC AUTOMATED: 0 PER 100 WBC
PDW BLD-RTO: 17.3 % (ref 11.8–14.4)
PLATELET # BLD: 199 K/UL (ref 138–453)
PLATELET ESTIMATE: ABNORMAL
PMV BLD AUTO: 10 FL (ref 8.1–13.5)
POTASSIUM SERPL-SCNC: 3.6 MMOL/L (ref 3.7–5.3)
RBC # BLD: 4.59 M/UL (ref 3.95–5.11)
RBC # BLD: ABNORMAL 10*6/UL
SEG NEUTROPHILS: 57 % (ref 36–65)
SEGMENTED NEUTROPHILS ABSOLUTE COUNT: 3.31 K/UL (ref 1.5–8.1)
SODIUM BLD-SCNC: 138 MMOL/L (ref 135–144)
WBC # BLD: 5.9 K/UL (ref 3.5–11.3)
WBC # BLD: ABNORMAL 10*3/UL

## 2019-07-16 PROCEDURE — 80048 BASIC METABOLIC PNL TOTAL CA: CPT

## 2019-07-16 PROCEDURE — 6360000002 HC RX W HCPCS: Performed by: OBSTETRICS & GYNECOLOGY

## 2019-07-16 PROCEDURE — 6370000000 HC RX 637 (ALT 250 FOR IP): Performed by: ADVANCED PRACTICE MIDWIFE

## 2019-07-16 PROCEDURE — 85025 COMPLETE CBC W/AUTO DIFF WBC: CPT

## 2019-07-16 PROCEDURE — 2580000003 HC RX 258: Performed by: OBSTETRICS & GYNECOLOGY

## 2019-07-16 PROCEDURE — 36415 COLL VENOUS BLD VENIPUNCTURE: CPT

## 2019-07-16 PROCEDURE — 6370000000 HC RX 637 (ALT 250 FOR IP): Performed by: OBSTETRICS & GYNECOLOGY

## 2019-07-16 PROCEDURE — 2500000003 HC RX 250 WO HCPCS: Performed by: OBSTETRICS & GYNECOLOGY

## 2019-07-16 RX ORDER — MORPHINE SULFATE 2 MG/ML
1 INJECTION, SOLUTION INTRAMUSCULAR; INTRAVENOUS ONCE
Status: COMPLETED | OUTPATIENT
Start: 2019-07-16 | End: 2019-07-16

## 2019-07-16 RX ORDER — ONDANSETRON 2 MG/ML
8 INJECTION INTRAMUSCULAR; INTRAVENOUS EVERY 8 HOURS PRN
Status: DISCONTINUED | OUTPATIENT
Start: 2019-07-16 | End: 2019-07-16 | Stop reason: HOSPADM

## 2019-07-16 RX ADMIN — CLINDAMYCIN PHOSPHATE 600 MG: 600 INJECTION, SOLUTION INTRAVENOUS at 15:06

## 2019-07-16 RX ADMIN — ONDANSETRON 4 MG: 4 TABLET, ORALLY DISINTEGRATING ORAL at 09:01

## 2019-07-16 RX ADMIN — IBUPROFEN 800 MG: 800 TABLET, FILM COATED ORAL at 07:57

## 2019-07-16 RX ADMIN — MORPHINE SULFATE 1 MG: 2 INJECTION, SOLUTION INTRAMUSCULAR; INTRAVENOUS at 11:23

## 2019-07-16 RX ADMIN — OXYCODONE HYDROCHLORIDE AND ACETAMINOPHEN 1 TABLET: 5; 325 TABLET ORAL at 08:59

## 2019-07-16 RX ADMIN — OXYCODONE HYDROCHLORIDE AND ACETAMINOPHEN 1 TABLET: 5; 325 TABLET ORAL at 00:55

## 2019-07-16 RX ADMIN — CLINDAMYCIN PHOSPHATE 600 MG: 600 INJECTION, SOLUTION INTRAVENOUS at 00:48

## 2019-07-16 RX ADMIN — OXYCODONE HYDROCHLORIDE AND ACETAMINOPHEN 1 TABLET: 5; 325 TABLET ORAL at 04:55

## 2019-07-16 RX ADMIN — CLINDAMYCIN PHOSPHATE 600 MG: 600 INJECTION, SOLUTION INTRAVENOUS at 05:26

## 2019-07-16 RX ADMIN — ONDANSETRON 8 MG: 2 INJECTION INTRAMUSCULAR; INTRAVENOUS at 17:45

## 2019-07-16 RX ADMIN — PIPERACILLIN SODIUM,TAZOBACTAM SODIUM 3.38 G: 3; .375 INJECTION, POWDER, FOR SOLUTION INTRAVENOUS at 10:55

## 2019-07-16 RX ADMIN — ONDANSETRON 8 MG: 2 INJECTION INTRAMUSCULAR; INTRAVENOUS at 11:10

## 2019-07-16 RX ADMIN — OXYCODONE HYDROCHLORIDE AND ACETAMINOPHEN 1 TABLET: 5; 325 TABLET ORAL at 13:58

## 2019-07-16 RX ADMIN — MORPHINE SULFATE 1 MG: 2 INJECTION, SOLUTION INTRAMUSCULAR; INTRAVENOUS at 17:13

## 2019-07-16 ASSESSMENT — PAIN DESCRIPTION - ORIENTATION
ORIENTATION: RIGHT
ORIENTATION: RIGHT;UPPER;LOWER;MID

## 2019-07-16 ASSESSMENT — PAIN DESCRIPTION - DESCRIPTORS
DESCRIPTORS: BURNING;THROBBING;TIGHTNESS
DESCRIPTORS: BURNING;THROBBING
DESCRIPTORS: BURNING
DESCRIPTORS: BURNING;TIGHTNESS

## 2019-07-16 ASSESSMENT — PAIN DESCRIPTION - LOCATION
LOCATION: LABIA

## 2019-07-16 ASSESSMENT — PAIN SCALES - GENERAL
PAINLEVEL_OUTOF10: 6
PAINLEVEL_OUTOF10: 5
PAINLEVEL_OUTOF10: 6
PAINLEVEL_OUTOF10: 8
PAINLEVEL_OUTOF10: 8
PAINLEVEL_OUTOF10: 10
PAINLEVEL_OUTOF10: 8
PAINLEVEL_OUTOF10: 9

## 2019-07-16 ASSESSMENT — PAIN DESCRIPTION - PAIN TYPE
TYPE: ACUTE PAIN

## 2019-07-16 ASSESSMENT — PAIN DESCRIPTION - FREQUENCY
FREQUENCY: CONTINUOUS
FREQUENCY: CONTINUOUS

## 2019-07-16 ASSESSMENT — PAIN DESCRIPTION - ONSET: ONSET: ON-GOING

## 2019-07-16 NOTE — H&P
Department of Gynecology  Dr. Ellyn Fish  History and Physical    Late entry note:    CHIEF COMPLAINT:  Vulvar pain    History obtained from patient    HISTORY OF PRESENT ILLNESS:                     The patient is a 33 y.o. female who presents with a vulvar mass and severe pain for 48 hours. Narrative: Richard Mckeon is a 32 y/o otherwise healthy female who delivered a term mckeon on 6/24/19. She was seen in my office on 7/11/19 with a 24 hr history of painless small nodule (1x1 cm) just above the clitoral zheng just above the right side. At that time I was not concerned about the lesion thinking that perhaps she had a small hematoma and decided to reevaluate her in a week. Two days later the patient went to the ER with the lesion being the size of a golf ball and exquisitely tender. The ER practitioner lanced the lesion only obtaining blood. She was then sent home on analgesics and antibiotics. Richard Mckeon returned to the ER 24 hours late with extreme pain, fever and the lesion now the size of a baseball. The on call OB GYN admitted her as a vulvar abscess and  The patient was started on IV clindamycin and analgesics. The on call OB GYN re evaluated the patient yesterday and decided to give her another 24 hrs of IV clindamycin. A CT scan of the pelvis showed a 1.4 x 2.6 cm fluid collection with the presence of gas. I examined Richard Mckeon for the first time this morning and found her in severe pain and nauseous. The pain now extending to right groin and buttock. Her vital signs were stable. The vulvar exam showed a large area of induration that starts on the mons pubis and extend all throughout the clitoris, right labia major a and minora. The area is extremely painful at palpation. There could be some crepitation. Richard Mckeon was not cultured and fever was only documented once while in the hospital. There is no leukocytosis. Richard Mckeon is clinically stable, not septic.       Past Medical History:        Diagnosis Date    Anemia    

## 2019-07-16 NOTE — PLAN OF CARE
Problem: Pain:  Goal: Pain level will decrease  Description  Pain level will decrease  Outcome: Ongoing  Note:   Assess patients pain every four hours and as needed using the 1-10 pain scale. Patient encouraged to reposition every two hours and as needed. Medications as prescribed by physician. Alternatives to pain medications provided as tolerated. Will continue to monitor. Problem: Falls - Risk of:  Goal: Will remain free from falls  Description  Will remain free from falls  Outcome: Ongoing  Note:   Patient is alert and oriented and has demonstrated the use of using the call light for assistance before getting up. Education has been provided to defer the use of the bed alarm and/or restraint free alarm as the patient has shown competency in calling for assistance and verbalizing the risk. Falling star program in use with fall band in place. Non skid socks are worn by patient. Call light, and bed side table along with personal belongings are within reach of patient. Will continue to monitor.

## 2020-07-22 ENCOUNTER — HOSPITAL ENCOUNTER (OUTPATIENT)
Age: 30
Discharge: HOME | End: 2020-07-24
Payer: COMMERCIAL

## 2020-07-22 ENCOUNTER — HOSPITAL ENCOUNTER (OUTPATIENT)
Dept: GENERAL RADIOLOGY | Age: 30
Discharge: HOME | End: 2020-07-24
Payer: COMMERCIAL

## 2020-07-22 ENCOUNTER — HOSPITAL ENCOUNTER (OUTPATIENT)
Age: 30
Setting detail: SPECIMEN
Discharge: HOME | End: 2020-07-22
Payer: COMMERCIAL

## 2020-07-22 ENCOUNTER — OFFICE VISIT (OUTPATIENT)
Dept: UROLOGY | Age: 30
End: 2020-07-22
Payer: COMMERCIAL

## 2020-07-22 VITALS
WEIGHT: 145.6 LBS | HEIGHT: 63 IN | DIASTOLIC BLOOD PRESSURE: 58 MMHG | SYSTOLIC BLOOD PRESSURE: 102 MMHG | BODY MASS INDEX: 25.8 KG/M2

## 2020-07-22 LAB
-: ABNORMAL
AMORPHOUS: ABNORMAL
BACTERIA: ABNORMAL
BILIRUBIN URINE: NEGATIVE
CASTS UA: ABNORMAL /LPF
COLOR: YELLOW
COMMENT UA: ABNORMAL
CRYSTALS, UA: ABNORMAL /HPF
EPITHELIAL CELLS UA: ABNORMAL /HPF (ref 0–25)
GLUCOSE URINE: NEGATIVE
KETONES, URINE: NEGATIVE
LEUKOCYTE ESTERASE, URINE: ABNORMAL
MUCUS: ABNORMAL
NITRITE, URINE: NEGATIVE
OTHER OBSERVATIONS UA: ABNORMAL
PH UA: 6 (ref 5–9)
PROTEIN UA: NEGATIVE
RBC UA: ABNORMAL /HPF (ref 0–2)
RENAL EPITHELIAL, UA: ABNORMAL /HPF
SPECIFIC GRAVITY UA: 1.02 (ref 1.01–1.02)
TRICHOMONAS: ABNORMAL
TURBIDITY: CLEAR
URINE HGB: NEGATIVE
UROBILINOGEN, URINE: NORMAL
WBC UA: ABNORMAL /HPF (ref 0–5)
YEAST: ABNORMAL

## 2020-07-22 PROCEDURE — 1036F TOBACCO NON-USER: CPT | Performed by: UROLOGY

## 2020-07-22 PROCEDURE — 81001 URINALYSIS AUTO W/SCOPE: CPT

## 2020-07-22 PROCEDURE — 87086 URINE CULTURE/COLONY COUNT: CPT

## 2020-07-22 PROCEDURE — G8427 DOCREV CUR MEDS BY ELIG CLIN: HCPCS | Performed by: UROLOGY

## 2020-07-22 PROCEDURE — 74018 RADEX ABDOMEN 1 VIEW: CPT

## 2020-07-22 PROCEDURE — G8419 CALC BMI OUT NRM PARAM NOF/U: HCPCS | Performed by: UROLOGY

## 2020-07-22 PROCEDURE — 99205 OFFICE O/P NEW HI 60 MIN: CPT | Performed by: UROLOGY

## 2020-07-22 RX ORDER — PROMETHAZINE HYDROCHLORIDE 12.5 MG/1
12.5 TABLET ORAL EVERY 6 HOURS PRN
COMMUNITY
End: 2020-08-26 | Stop reason: SDUPTHER

## 2020-07-22 RX ORDER — OXYCODONE HYDROCHLORIDE AND ACETAMINOPHEN 5; 325 MG/1; MG/1
1 TABLET ORAL EVERY 6 HOURS PRN
Qty: 12 TABLET | Refills: 0 | Status: SHIPPED | OUTPATIENT
Start: 2020-07-22 | End: 2020-07-25

## 2020-07-22 RX ORDER — PROMETHAZINE HYDROCHLORIDE 25 MG/1
25 TABLET ORAL 3 TIMES DAILY PRN
Qty: 12 TABLET | Refills: 0 | Status: SHIPPED | OUTPATIENT
Start: 2020-07-22 | End: 2020-07-29

## 2020-07-22 RX ORDER — OXYCODONE HYDROCHLORIDE AND ACETAMINOPHEN 5; 325 MG/1; MG/1
TABLET ORAL
COMMUNITY
Start: 2020-06-27 | End: 2020-09-30

## 2020-07-22 ASSESSMENT — ENCOUNTER SYMPTOMS
NAUSEA: 0
ABDOMINAL PAIN: 0
EYE PAIN: 0
COUGH: 0
COLOR CHANGE: 0
SHORTNESS OF BREATH: 0
VOMITING: 0
EYE REDNESS: 0
WHEEZING: 0
BACK PAIN: 0

## 2020-07-22 NOTE — PATIENT INSTRUCTIONS
SURVEY:    You may be receiving a survey from [employer] regarding your visit today. Please complete the survey to enable us to provide the highest quality of care to you and your family. If you cannot score us a very good on any question, please call the office to discuss how we could of made your experience a very good one. Thank you.

## 2020-07-22 NOTE — PROGRESS NOTES
HPI:    Patient is a 26 y.o. female in no acute distress. She is alert and oriented to person, place, and time. Patient being seen here today as a new patient. Patient was last seen in our office approximately 4 years ago. Patient was recently seen at an outside facility. Patient has a slight burning with urination currently which started after her right flank pain. Patient has been a patient here in the past.  Last time we worked on her was approximately 4 years ago. Patient has had some left flank pain. She did pass a small stone approximately 2 weeks ago. She is having right flank pain now. She is concerned that her pain is present and she does have an upcoming wedding. She is having some nausea and vomiting. She reports a slight dysuria. She has had no fevers. Past Medical History:   Diagnosis Date    Anemia     GERD (gastroesophageal reflux disease)     Kidney stones     Postpartum depression      Past Surgical History:   Procedure Laterality Date    EXTERNAL EAR SURGERY      OTHER SURGICAL HISTORY Right 5/26/2016    right cysto HLL with stent exchange    UPPER GASTROINTESTINAL ENDOSCOPY       Outpatient Encounter Medications as of 7/22/2020   Medication Sig Dispense Refill    acetaminophen (TYLENOL) 500 MG tablet Take 1,000 mg by mouth every 6 hours as needed for Pain      ibuprofen (ADVIL;MOTRIN) 800 MG tablet Take 800 mg by mouth every 6 hours as needed for Pain      omeprazole (PRILOSEC) 20 MG delayed release capsule Take 1 capsule by mouth daily 30 capsule 0     No facility-administered encounter medications on file as of 7/22/2020.        Current Outpatient Medications on File Prior to Visit   Medication Sig Dispense Refill    acetaminophen (TYLENOL) 500 MG tablet Take 1,000 mg by mouth every 6 hours as needed for Pain      ibuprofen (ADVIL;MOTRIN) 800 MG tablet Take 800 mg by mouth every 6 hours as needed for Pain      omeprazole (PRILOSEC) 20 MG delayed release capsule Take 1 capsule by mouth daily 30 capsule 0     No current facility-administered medications on file prior to visit. Reglan [metoclopramide] and Norco [hydrocodone-acetaminophen]  Family History   Problem Relation Age of Onset    Ulcerative Colitis Father     Ulcerative Colitis Sister     Asthma Sister     Heart Disease Maternal Grandfather     Stroke Maternal Grandfather     High Blood Pressure Maternal Grandfather     Arthritis Maternal Grandmother     Vision Loss Maternal Grandmother      Social History     Tobacco Use   Smoking Status Never Smoker   Smokeless Tobacco Never Used       Social History     Substance and Sexual Activity   Alcohol Use No       Review of Systems   Constitutional: Negative for appetite change, chills and fever. Eyes: Negative for pain, redness and visual disturbance. Respiratory: Negative for cough, shortness of breath and wheezing. Cardiovascular: Negative for chest pain and leg swelling. Gastrointestinal: Negative for abdominal pain, nausea and vomiting. Genitourinary: Positive for flank pain. Negative for difficulty urinating, dysuria, frequency, hematuria, pelvic pain, vaginal bleeding and vaginal discharge. Musculoskeletal: Negative for back pain, joint swelling and myalgias. Skin: Negative for color change, rash and wound. Neurological: Negative for dizziness, tremors and numbness. Hematological: Negative for adenopathy. Does not bruise/bleed easily. Ht 5' 3\" (1.6 m)   BMI 25.33 kg/m²       PHYSICAL EXAM:  Constitutional: Patient resting comfortably, in no acute distress. Neuro: Alert and oriented to person place and time. Cranial nerves grossly intact.     Psych: Mood and affect normal.  Skin: Warm, dry  HEENT: normocephalic, atraumatic  Lymphatics: No palpable lymphadenopathy  Lungs: Respiratory effort normal, unlabored  Cardiovascular:  Normal peripheral pulses  Abdomen: Soft, non-tender, non-distended with no organomegaly or palpable masses. : No CVA tenderness. Bladder non-tender and not distended. Pelvic: deferred    Lab Results   Component Value Date    BUN 14 07/16/2019     Lab Results   Component Value Date    CREATININE 1.07 (H) 07/16/2019       ASSESSMENT:  This is a 26 y.o. female with the following diagnoses:   Diagnosis Orders   1. Renal calculus  XR ABDOMEN (KUB) (SINGLE AP VIEW)    Urinalysis with Microscopic    Culture, Urine   2. Ureteral calculus  Urinalysis with Microscopic    Culture, Urine   3. Renal colic on right side  promethazine (PHENERGAN) 25 MG tablet    oxyCODONE-acetaminophen (PERCOCET) 5-325 MG per tablet         PLAN:  We will plan for KUB now. We will plan for definitive stone tx on the right. We did discuss risks and benefits of ESWL vs. HLL. We did order her Percocet and Phenergan. She does understand there major risk factors associated with the procedure such as ureteral perforation. We will check UA CNS today. Will review the KUB afterwards and decide what is the best treatment course.

## 2020-07-23 ENCOUNTER — TELEPHONE (OUTPATIENT)
Dept: UROLOGY | Age: 30
End: 2020-07-23

## 2020-07-23 LAB
CULTURE: NORMAL
Lab: NORMAL
SPECIMEN DESCRIPTION: NORMAL

## 2020-07-23 NOTE — TELEPHONE ENCOUNTER
Called patient and informed her of xray results and the need for ESWL. Patient does not want to go to [facility] since she lives in [place]. Will schedule patient in [place] - once scheduled patient will be notified.

## 2020-07-23 NOTE — TELEPHONE ENCOUNTER
----- Message from FÁTIMA Mcclure CNP sent at 7/22/2020  5:13 PM EDT -----  Please schedule for ESWL in [facility] for a 4mm right renal stone.

## 2020-07-24 ENCOUNTER — TELEPHONE (OUTPATIENT)
Dept: UROLOGY | Age: 30
End: 2020-07-24

## 2020-07-24 NOTE — TELEPHONE ENCOUNTER
----- Message from FÁTIMA Roldan - CNP sent at 7/23/2020 10:08 PM EDT -----  Call pt - urine cx reviewed and negative for UTI

## 2020-08-10 NOTE — PROGRESS NOTES
Patient instructed on the pre-operative, intra-operative, and post-operative process. Patient instructed on NPO status. Medication instructions reviewed with patient. Pre operative instruction sheet reviewed over the phone. Instructed p to stop taking ibuprofen as of 8/19/20 and to take prilosec with a small sip of water prior to arriving to the hospital the day of surgery.

## 2020-08-18 ENCOUNTER — HOSPITAL ENCOUNTER (OUTPATIENT)
Dept: PREADMISSION TESTING | Age: 30
Setting detail: SPECIMEN
Discharge: HOME | End: 2020-08-22
Payer: COMMERCIAL

## 2020-08-18 DIAGNOSIS — Z01.818 PREOP TESTING: Primary | ICD-10-CM

## 2020-08-18 PROCEDURE — U0003 INFECTIOUS AGENT DETECTION BY NUCLEIC ACID (DNA OR RNA); SEVERE ACUTE RESPIRATORY SYNDROME CORONAVIRUS 2 (SARS-COV-2) (CORONAVIRUS DISEASE [COVID-19]), AMPLIFIED PROBE TECHNIQUE, MAKING USE OF HIGH THROUGHPUT TECHNOLOGIES AS DESCRIBED BY CMS-2020-01-R: HCPCS

## 2020-08-18 PROCEDURE — C9803 HOPD COVID-19 SPEC COLLECT: HCPCS

## 2020-08-20 LAB — SARS-COV-2, NAA: NOT DETECTED

## 2020-08-21 ENCOUNTER — TELEPHONE (OUTPATIENT)
Dept: PRIMARY CARE CLINIC | Age: 30
End: 2020-08-21

## 2020-08-24 NOTE — H&P
Historical Provider, MD   omeprazole (PRILOSEC) 20 MG delayed release capsule Take 1 capsule by mouth daily  Patient taking differently: Take 20 mg by mouth daily as needed  11/25/18 7/22/20  Christel Hernandez, [address]       Allergies:  Reglan [metoclopramide] and Norco [hydrocodone-acetaminophen]    Social History:    Social History     Socioeconomic History    Marital status:      Spouse name: Not on file    Number of children: Not on file    Years of education: Not on file    Highest education level: Not on file   Occupational History    Not on file   Social Needs    Financial resource strain: Not on file    Food insecurity     Worry: Not on file     Inability: Not on file   [employer] needs     Medical: Not on file     Non-medical: Not on file   Tobacco Use    Smoking status: Never Smoker    Smokeless tobacco: Never Used   Substance and Sexual Activity    Alcohol use: Yes     Comment: SOCIAL    Drug use: No    Sexual activity: Yes     Partners: Male   Lifestyle    Physical activity     Days per week: Not on file     Minutes per session: Not on file    Stress: Not on file   Relationships    Social connections     Talks on phone: Not on file     Gets together: Not on file     Attends Religion service: Not on file     Active member of club or organization: Not on file     Attends meetings of clubs or organizations: Not on file     Relationship status: Not on file    Intimate partner violence     Fear of current or ex partner: Not on file     Emotionally abused: Not on file     Physically abused: Not on file     Forced sexual activity: Not on file   Other Topics Concern    Not on file   Social History Narrative    Not on file       Family History:    Family History   Problem Relation Age of Onset    Ulcerative Colitis Father     Ulcerative Colitis Sister     Asthma Sister     Heart Disease Maternal Grandfather     Stroke Maternal Grandfather     High Blood Pressure Maternal Grandfather

## 2020-08-25 ENCOUNTER — ANESTHESIA (OUTPATIENT)
Dept: OPERATING ROOM | Age: 30
End: 2020-08-25
Payer: COMMERCIAL

## 2020-08-25 ENCOUNTER — HOSPITAL ENCOUNTER (OUTPATIENT)
Age: 30
Setting detail: OUTPATIENT SURGERY
Discharge: HOME | End: 2020-08-25
Attending: UROLOGY | Admitting: UROLOGY
Payer: COMMERCIAL

## 2020-08-25 ENCOUNTER — ANESTHESIA EVENT (OUTPATIENT)
Dept: OPERATING ROOM | Age: 30
End: 2020-08-25
Payer: COMMERCIAL

## 2020-08-25 VITALS
BODY MASS INDEX: 25.69 KG/M2 | RESPIRATION RATE: 18 BRPM | TEMPERATURE: 97 F | HEART RATE: 76 BPM | SYSTOLIC BLOOD PRESSURE: 107 MMHG | HEIGHT: 63 IN | WEIGHT: 145 LBS | OXYGEN SATURATION: 97 % | DIASTOLIC BLOOD PRESSURE: 70 MMHG

## 2020-08-25 VITALS — OXYGEN SATURATION: 99 % | SYSTOLIC BLOOD PRESSURE: 95 MMHG | TEMPERATURE: 91.6 F | DIASTOLIC BLOOD PRESSURE: 58 MMHG

## 2020-08-25 LAB — HCG(URINE) PREGNANCY TEST: NEGATIVE

## 2020-08-25 PROCEDURE — 3600000014 HC SURGERY LEVEL 4 ADDTL 15MIN: Performed by: UROLOGY

## 2020-08-25 PROCEDURE — 7100000001 HC PACU RECOVERY - ADDTL 15 MIN: Performed by: UROLOGY

## 2020-08-25 PROCEDURE — 2580000003 HC RX 258: Performed by: UROLOGY

## 2020-08-25 PROCEDURE — 6360000002 HC RX W HCPCS: Performed by: NURSE ANESTHETIST, CERTIFIED REGISTERED

## 2020-08-25 PROCEDURE — 6360000002 HC RX W HCPCS: Performed by: UROLOGY

## 2020-08-25 PROCEDURE — 3700000001 HC ADD 15 MINUTES (ANESTHESIA): Performed by: UROLOGY

## 2020-08-25 PROCEDURE — 6370000000 HC RX 637 (ALT 250 FOR IP): Performed by: UROLOGY

## 2020-08-25 PROCEDURE — 3600000004 HC SURGERY LEVEL 4 BASE: Performed by: UROLOGY

## 2020-08-25 PROCEDURE — 2500000003 HC RX 250 WO HCPCS: Performed by: NURSE ANESTHETIST, CERTIFIED REGISTERED

## 2020-08-25 PROCEDURE — 3700000000 HC ANESTHESIA ATTENDED CARE: Performed by: UROLOGY

## 2020-08-25 PROCEDURE — 2709999900 HC NON-CHARGEABLE SUPPLY: Performed by: UROLOGY

## 2020-08-25 PROCEDURE — 81025 URINE PREGNANCY TEST: CPT

## 2020-08-25 PROCEDURE — 7100000010 HC PHASE II RECOVERY - FIRST 15 MIN: Performed by: UROLOGY

## 2020-08-25 PROCEDURE — 7100000011 HC PHASE II RECOVERY - ADDTL 15 MIN: Performed by: UROLOGY

## 2020-08-25 PROCEDURE — 7100000000 HC PACU RECOVERY - FIRST 15 MIN: Performed by: UROLOGY

## 2020-08-25 RX ORDER — DIPHENHYDRAMINE HYDROCHLORIDE 50 MG/ML
INJECTION INTRAMUSCULAR; INTRAVENOUS PRN
Status: DISCONTINUED | OUTPATIENT
Start: 2020-08-25 | End: 2020-08-25 | Stop reason: SDUPTHER

## 2020-08-25 RX ORDER — FENTANYL CITRATE 50 UG/ML
25 INJECTION, SOLUTION INTRAMUSCULAR; INTRAVENOUS EVERY 5 MIN PRN
Status: DISCONTINUED | OUTPATIENT
Start: 2020-08-25 | End: 2020-08-25 | Stop reason: HOSPADM

## 2020-08-25 RX ORDER — KETOROLAC TROMETHAMINE 15 MG/ML
30 INJECTION, SOLUTION INTRAMUSCULAR; INTRAVENOUS ONCE
Status: COMPLETED | OUTPATIENT
Start: 2020-08-25 | End: 2020-08-25

## 2020-08-25 RX ORDER — ONDANSETRON 2 MG/ML
4 INJECTION INTRAMUSCULAR; INTRAVENOUS
Status: COMPLETED | OUTPATIENT
Start: 2020-08-25 | End: 2020-08-25

## 2020-08-25 RX ORDER — PROMETHAZINE HYDROCHLORIDE 25 MG/ML
6.25 INJECTION, SOLUTION INTRAMUSCULAR; INTRAVENOUS
Status: COMPLETED | OUTPATIENT
Start: 2020-08-25 | End: 2020-08-25

## 2020-08-25 RX ORDER — FENTANYL CITRATE 50 UG/ML
50 INJECTION, SOLUTION INTRAMUSCULAR; INTRAVENOUS EVERY 5 MIN PRN
Status: DISCONTINUED | OUTPATIENT
Start: 2020-08-25 | End: 2020-08-25 | Stop reason: HOSPADM

## 2020-08-25 RX ORDER — DEXAMETHASONE SODIUM PHOSPHATE 4 MG/ML
INJECTION, SOLUTION INTRA-ARTICULAR; INTRALESIONAL; INTRAMUSCULAR; INTRAVENOUS; SOFT TISSUE PRN
Status: DISCONTINUED | OUTPATIENT
Start: 2020-08-25 | End: 2020-08-25 | Stop reason: SDUPTHER

## 2020-08-25 RX ORDER — LIDOCAINE HYDROCHLORIDE 20 MG/ML
INJECTION, SOLUTION EPIDURAL; INFILTRATION; INTRACAUDAL; PERINEURAL PRN
Status: DISCONTINUED | OUTPATIENT
Start: 2020-08-25 | End: 2020-08-25 | Stop reason: SDUPTHER

## 2020-08-25 RX ORDER — OXYCODONE HYDROCHLORIDE AND ACETAMINOPHEN 5; 325 MG/1; MG/1
1 TABLET ORAL EVERY 6 HOURS PRN
Qty: 12 TABLET | Refills: 0 | Status: SHIPPED | OUTPATIENT
Start: 2020-08-25 | End: 2020-08-28

## 2020-08-25 RX ORDER — SODIUM CHLORIDE 0.9 % (FLUSH) 0.9 %
10 SYRINGE (ML) INJECTION EVERY 12 HOURS SCHEDULED
Status: DISCONTINUED | OUTPATIENT
Start: 2020-08-25 | End: 2020-08-25 | Stop reason: HOSPADM

## 2020-08-25 RX ORDER — PROPOFOL 10 MG/ML
INJECTION, EMULSION INTRAVENOUS PRN
Status: DISCONTINUED | OUTPATIENT
Start: 2020-08-25 | End: 2020-08-25 | Stop reason: SDUPTHER

## 2020-08-25 RX ORDER — FENTANYL CITRATE 50 UG/ML
INJECTION, SOLUTION INTRAMUSCULAR; INTRAVENOUS PRN
Status: DISCONTINUED | OUTPATIENT
Start: 2020-08-25 | End: 2020-08-25 | Stop reason: SDUPTHER

## 2020-08-25 RX ORDER — SODIUM CHLORIDE, SODIUM LACTATE, POTASSIUM CHLORIDE, CALCIUM CHLORIDE 600; 310; 30; 20 MG/100ML; MG/100ML; MG/100ML; MG/100ML
INJECTION, SOLUTION INTRAVENOUS CONTINUOUS
Status: DISCONTINUED | OUTPATIENT
Start: 2020-08-25 | End: 2020-08-25 | Stop reason: HOSPADM

## 2020-08-25 RX ORDER — MIDAZOLAM HYDROCHLORIDE 1 MG/ML
INJECTION INTRAMUSCULAR; INTRAVENOUS PRN
Status: DISCONTINUED | OUTPATIENT
Start: 2020-08-25 | End: 2020-08-25 | Stop reason: SDUPTHER

## 2020-08-25 RX ORDER — CIPROFLOXACIN 2 MG/ML
400 INJECTION, SOLUTION INTRAVENOUS
Status: COMPLETED | OUTPATIENT
Start: 2020-08-25 | End: 2020-08-25

## 2020-08-25 RX ORDER — SODIUM CHLORIDE 0.9 % (FLUSH) 0.9 %
10 SYRINGE (ML) INJECTION PRN
Status: DISCONTINUED | OUTPATIENT
Start: 2020-08-25 | End: 2020-08-25 | Stop reason: HOSPADM

## 2020-08-25 RX ORDER — OXYCODONE HYDROCHLORIDE AND ACETAMINOPHEN 5; 325 MG/1; MG/1
1 TABLET ORAL ONCE
Status: COMPLETED | OUTPATIENT
Start: 2020-08-25 | End: 2020-08-25

## 2020-08-25 RX ADMIN — KETOROLAC TROMETHAMINE 30 MG: 15 INJECTION, SOLUTION INTRAMUSCULAR; INTRAVENOUS at 12:56

## 2020-08-25 RX ADMIN — PROMETHAZINE HYDROCHLORIDE 6.25 MG: 25 INJECTION, SOLUTION INTRAMUSCULAR; INTRAVENOUS at 13:14

## 2020-08-25 RX ADMIN — FENTANYL CITRATE 25 MCG: 50 INJECTION, SOLUTION INTRAMUSCULAR; INTRAVENOUS at 12:09

## 2020-08-25 RX ADMIN — LIDOCAINE HYDROCHLORIDE 80 MG: 20 INJECTION, SOLUTION EPIDURAL; INFILTRATION; INTRACAUDAL; PERINEURAL at 10:56

## 2020-08-25 RX ADMIN — CIPROFLOXACIN 400 MG: 2 INJECTION, SOLUTION INTRAVENOUS at 10:46

## 2020-08-25 RX ADMIN — PROPOFOL 200 MG: 10 INJECTION, EMULSION INTRAVENOUS at 10:56

## 2020-08-25 RX ADMIN — DEXAMETHASONE SODIUM PHOSPHATE 8 MG: 4 INJECTION, SOLUTION INTRAMUSCULAR; INTRAVENOUS at 11:05

## 2020-08-25 RX ADMIN — OXYCODONE HYDROCHLORIDE AND ACETAMINOPHEN 1 TABLET: 5; 325 TABLET ORAL at 13:53

## 2020-08-25 RX ADMIN — FENTANYL CITRATE 50 MCG: 50 INJECTION, SOLUTION INTRAMUSCULAR; INTRAVENOUS at 10:56

## 2020-08-25 RX ADMIN — MIDAZOLAM 2 MG: 1 INJECTION INTRAMUSCULAR; INTRAVENOUS at 10:47

## 2020-08-25 RX ADMIN — ONDANSETRON 4 MG: 2 INJECTION INTRAMUSCULAR; INTRAVENOUS at 11:24

## 2020-08-25 RX ADMIN — FENTANYL CITRATE 25 MCG: 50 INJECTION, SOLUTION INTRAMUSCULAR; INTRAVENOUS at 12:23

## 2020-08-25 RX ADMIN — SODIUM CHLORIDE, POTASSIUM CHLORIDE, SODIUM LACTATE AND CALCIUM CHLORIDE: 600; 310; 30; 20 INJECTION, SOLUTION INTRAVENOUS at 07:22

## 2020-08-25 RX ADMIN — FENTANYL CITRATE 50 MCG: 50 INJECTION, SOLUTION INTRAMUSCULAR; INTRAVENOUS at 11:17

## 2020-08-25 RX ADMIN — DIPHENHYDRAMINE HYDROCHLORIDE 12.5 MG: 50 INJECTION INTRAMUSCULAR; INTRAVENOUS at 11:05

## 2020-08-25 ASSESSMENT — PAIN SCALES - GENERAL
PAINLEVEL_OUTOF10: 6
PAINLEVEL_OUTOF10: 8
PAINLEVEL_OUTOF10: 6
PAINLEVEL_OUTOF10: 0
PAINLEVEL_OUTOF10: 7
PAINLEVEL_OUTOF10: 0
PAINLEVEL_OUTOF10: 8
PAINLEVEL_OUTOF10: 5

## 2020-08-25 ASSESSMENT — PAIN - FUNCTIONAL ASSESSMENT: PAIN_FUNCTIONAL_ASSESSMENT: 0-10

## 2020-08-25 ASSESSMENT — PAIN DESCRIPTION - PAIN TYPE
TYPE: SURGICAL PAIN
TYPE: SURGICAL PAIN

## 2020-08-25 ASSESSMENT — PAIN DESCRIPTION - DESCRIPTORS: DESCRIPTORS: POUNDING;CONSTANT

## 2020-08-25 ASSESSMENT — PAIN DESCRIPTION - LOCATION: LOCATION: BACK

## 2020-08-25 ASSESSMENT — PAIN DESCRIPTION - ORIENTATION: ORIENTATION: RIGHT

## 2020-08-25 NOTE — PROGRESS NOTES
Pt rates pain #7 out of 10, with FLACC of 2. Patient requesting percocet for home, states no longer has any percocet and it helps her kidney pain she has. Dr. Oliver Bowen updated, orders received. Pt dresses self. Patient states ready to be discharged at this time. Discharge instructions given to pt and sister. Both verbalize understanding,deny any questions and/or concerns. Pt transfer off unit in wheelchair w/ all belongings. Discharge Criteria    Inpatients must meet Criteria 1 through 7. All other patients are either YES or N/A. If a NO is chosen then Anesthesia or Surgeon must be notified. 1.  Minimum 30 minutes after last dose of sedative medication, minimum 120 minutes after last dose of reversal agent. Yes      2. Systolic BP stable within 20 mmHg for 30 minutes & systolic BP between 90 & 409 or within 10 mmHg of baseline. Yes      3. Pulse between 60 and 100 or within 10 bpm of baseline. Yes      4. Spontaneous respiratory rate >/= 10 per minute. Yes      5. SaO2 >/= 95 or  >/= baseline. Yes      6. Able to cough and swallow or return to baseline function. Yes      7. Alert and oriented or return to baseline mental status. Yes      8. Demonstrates controlled, coordinated movements, ambulates with steady gait, or return to baseline activity function. Yes      9. Minimal or no pain or nausea, or at a level tolerable and acceptable to patient. Yes      10. Takes and retains oral fluids as allowed. Yes      11. Procedural / perioperative site stable. Minimal or no bleeding. Yes          12. If GI endoscopy procedure, minimal or no abdominal distention or passing flatus. N/A      13. Written discharge instructions and emergency telephone number provided. Yes      14. Accompanied by a responsible adult.     Yes

## 2020-08-25 NOTE — OP NOTE
[address]                                OPERATIVE REPORT    PATIENT NAME: Luis M So                :        1990  MED REC NO:   [id number]                              ROOM:  ACCOUNT NO:   [de-identified]                           ADMIT DATE: 2020  PROVIDER:     Timur Trinidad    DATE OF PROCEDURE:  2020    SURGEON:  Dr. Timur Trinidad. ASSISTANT:  None. PREOPERATIVE DIAGNOSIS:  Right renal calculus x2. POSTOPERATIVE DIAGNOSIS:  Right renal calculus x2. PROCEDURE PERFORMED:  Right extracorporeal shock wave lithotripsy. ANESTHESIA:  General.    COMPLICATIONS:  None. ESTIMATED BLOOD LOSS:  Minimal.    SPECIMENS:  None. PROSTHESIS:  None. DISPOSITION:  Stable. FINDINGS:  Two 5-mm right renal stones. INDICATIONS:  The patient is a 69-year-old female with stone history,  here now for definitive therapy _____ stones on her imaging. DESCRIPTION OF PROCEDURE:  The patient was taken back to the operating  room after informed consent including all risks, benefits, and  alternatives were obtained. The patient was transferred from the [facility]  onto the operating table, where she was induced under general  anesthesia, and given IV Cipro for preoperative antibiotic prophylaxis. To begin the case, she was placed in the supine position on the  lithotripsy table. We identified the stones on biplanar fluoroscopy. Two stones were identified. We then used approximately 1500 shocks to  ablate both of these stones, 1500 shocks of treatment had a power level  6 at _____ removed. No additional fragments were seen. At this point  in time, she was awoken from general anesthesia, transferred to the  [facility], and taken to the PACU in satisfactory condition by Nursing and  Anesthesia Teams.     PLAN:  The patient will be discharged home per PACU criterion and  followup with us in three months with a repeat MARIA ELENA.         Kota Acuña    D: 08/25/2020 11:34:12       T: 08/25/2020 13:57:29     [email or web address]  Job#: [id number]     Doc#: [id number]    CC:

## 2020-08-25 NOTE — ANESTHESIA POSTPROCEDURE EVALUATION
Department of Anesthesiology  Postprocedure Note    Patient: Randa Bella  MRN: [id number]  YOB: 1990  Date of evaluation: 8/25/2020  Time:  3:24 PM     Procedure Summary     Date:  08/25/20 Room / Location:  [address] [place] [address]    Anesthesia Start:  7593 Anesthesia Stop:  5590    Procedure:  ESWL EXTRACORPOREAL SHOCK WAVE LITHOTRIPSY (Right ) Diagnosis:  (RIGHT RENAL CALCULUS)    Surgeon:  Martha Velasco MD Responsible Provider:  Lorriane Merlin, APRN - CRNA    Anesthesia Type:  general ASA Status:  2          Anesthesia Type: general    Yana Phase I: Yana Score: 9    Yana Phase II: Yana Score: 10    Last vitals: Reviewed and per EMR flowsheets.        Anesthesia Post Evaluation    Patient location during evaluation: PACU  Patient participation: complete - patient participated  Level of consciousness: awake and alert  Pain score: 7  Airway patency: patent  Nausea & Vomiting: no nausea and no vomiting  Complications: no  Cardiovascular status: blood pressure returned to baseline  Respiratory status: acceptable  Hydration status: euvolemic

## 2020-08-25 NOTE — ANESTHESIA PRE PROCEDURE
reflux disease) K21.9    Renal lithiasis N20.0    Ureteral calculus N20.1     contractions O47.9    Term pregnancy Z34.90    Labial abscess N76.4       Past Medical History:        Diagnosis Date    Anemia     GERD (gastroesophageal reflux disease)     Kidney stones     Postpartum depression        Past Surgical History:        Procedure Laterality Date    EXTERNAL EAR SURGERY Left     OTHER SURGICAL HISTORY Right 2016    right cysto HLL with stent exchange    UPPER GASTROINTESTINAL ENDOSCOPY      VAGINA SURGERY      LABIA HEMATOMA       Social History:    Social History     Tobacco Use    Smoking status: Never Smoker    Smokeless tobacco: Never Used   Substance Use Topics    Alcohol use: Yes     Comment: SOCIAL                                Counseling given: Not Answered      Vital Signs (Current):   Vitals:    08/10/20 1352 20 0717   BP:  (!) 150/84   Pulse:  90   Resp:  16   Temp:  36.9 °C (98.5 °F)   TempSrc:  Temporal   SpO2:  97%   Weight: 145 lb (65.8 kg) 145 lb (65.8 kg)   Height: 5' 3\" (1.6 m) 5' 3\" (1.6 m)                                              BP Readings from Last 3 Encounters:   20 (!) 150/84   20 (!) 102/58   19 103/63       NPO Status: Time of last liquid consumption:                         Time of last solid consumption:                         Date of last liquid consumption: 20                        Date of last solid food consumption: 20    BMI:   Wt Readings from Last 3 Encounters:   20 145 lb (65.8 kg)   20 145 lb 9.6 oz (66 kg)   19 143 lb (64.9 kg)     Body mass index is 25.69 kg/m².     CBC:   Lab Results   Component Value Date    WBC 5.9 2019    RBC 4.59 2019    RBC 4.64 2011    HGB 11.4 2019    HCT 37.5 2019    MCV 81.7 2019    RDW 17.3 2019     2019     2011       CMP:   Lab Results   Component Value Date     2019 K 3.6 07/16/2019     07/16/2019    CO2 26 07/16/2019    BUN 14 07/16/2019    CREATININE 1.07 07/16/2019    GFRAA >60 07/16/2019    LABGLOM >60 07/16/2019    GLUCOSE 98 07/16/2019    GLUCOSE 81 08/28/2011    PROT 7.6 07/14/2019    CALCIUM 8.7 07/16/2019    BILITOT 0.45 07/14/2019    ALKPHOS 105 07/14/2019    AST 32 07/14/2019    ALT 21 07/14/2019       POC Tests: No results for input(s): POCGLU, POCNA, POCK, POCCL, POCBUN, POCHEMO, POCHCT in the last 72 hours. Coags: No results found for: PROTIME, INR, APTT    HCG (If Applicable):   Lab Results   Component Value Date    PREGTESTUR NEGATIVE 08/25/2020    HCGQUANT 25,664 (H) 11/07/2018        ABGs: No results found for: PHART, PO2ART, KPA3KZO, BJE9ODB, BEART, B5EEBKRS     Type & Screen (If Applicable):  No results found for: LABABO, LABRH    Drug/Infectious Status (If Applicable):  No results found for: HIV, HEPCAB    COVID-19 Screening (If Applicable):   Lab Results   Component Value Date    COVID19 Not Detected 08/18/2020         Anesthesia Evaluation    Airway: Mallampati: II  TM distance: >3 FB   Neck ROM: full  Mouth opening: > = 3 FB Dental:          Pulmonary:Negative Pulmonary ROS and normal exam  breath sounds clear to auscultation                             Cardiovascular:Negative CV ROS  Exercise tolerance: good (>4 METS),           Rhythm: regular  Rate: normal           Beta Blocker:  Not on Beta Blocker         Neuro/Psych:   (+) psychiatric history: stable with treatment            GI/Hepatic/Renal:   (+) GERD: well controlled,           Endo/Other: Negative Endo/Other ROS                    Abdominal:           Vascular: negative vascular ROS. Anesthesia Plan      general     ASA 2       Induction: intravenous. MIPS: Prophylactic antiemetics administered. Anesthetic plan and risks discussed with patient.                       Isidor Boas, APRN - CRNA   8/25/2020

## 2020-08-26 ENCOUNTER — TELEPHONE (OUTPATIENT)
Dept: UROLOGY | Age: 30
End: 2020-08-26

## 2020-08-26 RX ORDER — PROMETHAZINE HYDROCHLORIDE 12.5 MG/1
12.5 TABLET ORAL EVERY 6 HOURS PRN
Qty: 10 TABLET | Refills: 0 | Status: SHIPPED | OUTPATIENT
Start: 2020-08-26 | End: 2020-09-30 | Stop reason: SDUPTHER

## 2020-08-27 ENCOUNTER — TELEPHONE (OUTPATIENT)
Dept: UROLOGY | Age: 30
End: 2020-08-27

## 2020-09-14 ENCOUNTER — OFFICE VISIT (OUTPATIENT)
Dept: UROLOGY | Age: 30
End: 2020-09-14
Payer: COMMERCIAL

## 2020-09-14 ENCOUNTER — HOSPITAL ENCOUNTER (OUTPATIENT)
Age: 30
Setting detail: SPECIMEN
Discharge: HOME | End: 2020-09-14
Payer: COMMERCIAL

## 2020-09-14 VITALS
TEMPERATURE: 98 F | HEART RATE: 86 BPM | WEIGHT: 147 LBS | BODY MASS INDEX: 26.05 KG/M2 | DIASTOLIC BLOOD PRESSURE: 83 MMHG | HEIGHT: 63 IN | SYSTOLIC BLOOD PRESSURE: 119 MMHG

## 2020-09-14 LAB
-: ABNORMAL
AMORPHOUS: ABNORMAL
BACTERIA: ABNORMAL
BILIRUBIN URINE: NEGATIVE
CASTS UA: ABNORMAL /LPF
COLOR: YELLOW
COMMENT UA: ABNORMAL
CRYSTALS, UA: ABNORMAL /HPF
EPITHELIAL CELLS UA: ABNORMAL /HPF (ref 0–25)
GLUCOSE URINE: NEGATIVE
KETONES, URINE: NEGATIVE
LEUKOCYTE ESTERASE, URINE: NEGATIVE
MUCUS: ABNORMAL
NITRITE, URINE: NEGATIVE
OTHER OBSERVATIONS UA: ABNORMAL
PH UA: 8 (ref 5–9)
PROTEIN UA: NEGATIVE
RBC UA: ABNORMAL /HPF (ref 0–2)
RENAL EPITHELIAL, UA: ABNORMAL /HPF
SPECIFIC GRAVITY UA: 1.02 (ref 1.01–1.02)
TRICHOMONAS: ABNORMAL
TURBIDITY: CLEAR
URINE HGB: NEGATIVE
UROBILINOGEN, URINE: NORMAL
WBC UA: ABNORMAL /HPF (ref 0–5)
YEAST: ABNORMAL

## 2020-09-14 PROCEDURE — 81001 URINALYSIS AUTO W/SCOPE: CPT

## 2020-09-14 PROCEDURE — 99024 POSTOP FOLLOW-UP VISIT: CPT | Performed by: UROLOGY

## 2020-09-14 PROCEDURE — 87086 URINE CULTURE/COLONY COUNT: CPT

## 2020-09-14 ASSESSMENT — ENCOUNTER SYMPTOMS
COLOR CHANGE: 0
WHEEZING: 0
ABDOMINAL PAIN: 0
BACK PAIN: 0
SHORTNESS OF BREATH: 0
VOMITING: 0
EYE PAIN: 0
COUGH: 0
NAUSEA: 0
EYE REDNESS: 0

## 2020-09-14 NOTE — PROGRESS NOTES
LABIA HEMATOMA     Outpatient Encounter Medications as of 9/14/2020   Medication Sig Dispense Refill    promethazine (PHENERGAN) 12.5 MG tablet Take 1 tablet by mouth every 6 hours as needed for Nausea 10 tablet 0    oxyCODONE-acetaminophen (PERCOCET) 5-325 MG per tablet take 1 tablet by mouth every 4 hours if needed for pain      acetaminophen (TYLENOL) 500 MG tablet Take 1,000 mg by mouth every 6 hours as needed for Pain      ibuprofen (ADVIL;MOTRIN) 800 MG tablet Take 800 mg by mouth every 6 hours as needed for Pain      omeprazole (PRILOSEC) 20 MG delayed release capsule Take 1 capsule by mouth daily (Patient taking differently: Take 20 mg by mouth daily as needed ) 30 capsule 0     No facility-administered encounter medications on file as of 9/14/2020. Current Outpatient Medications on File Prior to Visit   Medication Sig Dispense Refill    promethazine (PHENERGAN) 12.5 MG tablet Take 1 tablet by mouth every 6 hours as needed for Nausea 10 tablet 0    oxyCODONE-acetaminophen (PERCOCET) 5-325 MG per tablet take 1 tablet by mouth every 4 hours if needed for pain      acetaminophen (TYLENOL) 500 MG tablet Take 1,000 mg by mouth every 6 hours as needed for Pain      ibuprofen (ADVIL;MOTRIN) 800 MG tablet Take 800 mg by mouth every 6 hours as needed for Pain      omeprazole (PRILOSEC) 20 MG delayed release capsule Take 1 capsule by mouth daily (Patient taking differently: Take 20 mg by mouth daily as needed ) 30 capsule 0     No current facility-administered medications on file prior to visit.       Reglan [metoclopramide] and Norco [hydrocodone-acetaminophen]  Family History   Problem Relation Age of Onset    Ulcerative Colitis Father     Ulcerative Colitis Sister     Asthma Sister     Heart Disease Maternal Grandfather     Stroke Maternal Grandfather     High Blood Pressure Maternal Grandfather     Arthritis Maternal Grandmother     Vision Loss Maternal Grandmother      Social History Tobacco Use   Smoking Status Never Smoker   Smokeless Tobacco Never Used       Social History     Substance and Sexual Activity   Alcohol Use Yes    Comment: SOCIAL       Review of Systems   Constitutional: Negative for appetite change, chills and fever. Eyes: Negative for pain, redness and visual disturbance. Respiratory: Negative for cough, shortness of breath and wheezing. Cardiovascular: Negative for chest pain and leg swelling. Gastrointestinal: Negative for abdominal pain, nausea and vomiting. Genitourinary: Negative for difficulty urinating, dysuria, flank pain, frequency, hematuria, pelvic pain, vaginal bleeding and vaginal discharge. Musculoskeletal: Negative for back pain, joint swelling and myalgias. Skin: Negative for color change, rash and wound. Neurological: Negative for dizziness, tremors and numbness. Hematological: Negative for adenopathy. Does not bruise/bleed easily. /83 (Site: Right Upper Arm, Position: Sitting, Cuff Size: Medium Adult)   Pulse 86   Temp 98 °F (36.7 °C)   Ht 5' 3\" (1.6 m)   Wt 147 lb (66.7 kg)   LMP 08/07/2020 (Exact Date)   BMI 26.04 kg/m²       PHYSICAL EXAM:  Constitutional: Patient resting comfortably, in no acute distress. Neuro: Alert and oriented to person place and time. Cranial nerves grossly intact. Psych: Mood and affect normal.  Skin: Warm, dry  HEENT: normocephalic, atraumatic  Lymphatics: No palpable lymphadenopathy  Lungs: Respiratory effort normal, unlabored  Cardiovascular:  Normal peripheral pulses  Abdomen: Soft, non-tender, non-distended with no organomegaly or palpable masses. : No CVA tenderness. Bladder non-tender and not distended. Pelvic: Deferred    Lab Results   Component Value Date    BUN 14 07/16/2019     Lab Results   Component Value Date    CREATININE 1.07 (H) 07/16/2019       ASSESSMENT:  This is a 26 y.o. female with the following diagnoses:   Diagnosis Orders   1.  Renal colic on right side  US RENAL LIMITED    Urinalysis with Microscopic    Culture, Urine   2. Renal calculus  US RENAL LIMITED    Urinalysis with Microscopic    Culture, Urine         PLAN:  We will order renal ultrasound now. She will follow-up with us in 2 weeks. We will also check her urine for culture and sensitivity today. She does understand that she is severely limited in her ability to analyze due to her pregnancy. She will call sooner with issues.

## 2020-09-16 ENCOUNTER — TELEPHONE (OUTPATIENT)
Dept: UROLOGY | Age: 30
End: 2020-09-16

## 2020-09-16 LAB
CULTURE: NORMAL
Lab: NORMAL
SPECIMEN DESCRIPTION: NORMAL

## 2020-09-17 ENCOUNTER — HOSPITAL ENCOUNTER (OUTPATIENT)
Dept: ULTRASOUND IMAGING | Age: 30
Discharge: HOME | End: 2020-09-19
Payer: COMMERCIAL

## 2020-09-17 PROCEDURE — 76775 US EXAM ABDO BACK WALL LIM: CPT

## 2020-09-30 ENCOUNTER — VIRTUAL VISIT (OUTPATIENT)
Dept: UROLOGY | Age: 30
End: 2020-09-30
Payer: COMMERCIAL

## 2020-09-30 PROCEDURE — 99213 OFFICE O/P EST LOW 20 MIN: CPT | Performed by: NURSE PRACTITIONER

## 2020-09-30 PROCEDURE — G8427 DOCREV CUR MEDS BY ELIG CLIN: HCPCS | Performed by: NURSE PRACTITIONER

## 2020-09-30 RX ORDER — PROMETHAZINE HYDROCHLORIDE 12.5 MG/1
12.5 TABLET ORAL EVERY 6 HOURS PRN
Qty: 10 TABLET | Refills: 0 | Status: ON HOLD | OUTPATIENT
Start: 2020-09-30 | End: 2021-05-06 | Stop reason: HOSPADM

## 2020-09-30 ASSESSMENT — ENCOUNTER SYMPTOMS
EYE REDNESS: 0
NAUSEA: 0
CONSTIPATION: 0
WHEEZING: 0
COUGH: 0
EYE PAIN: 0
BACK PAIN: 0
VOMITING: 0
ABDOMINAL PAIN: 0
SHORTNESS OF BREATH: 0
COLOR CHANGE: 0

## 2020-09-30 NOTE — PROGRESS NOTES
9/14/2020  FÁTIMA Sears - CNP   oxyCODONE-acetaminophen (PERCOCET) 5-325 MG per tablet take 1 tablet by mouth every 4 hours if needed for pain  Historical Provider, MD   acetaminophen (TYLENOL) 500 MG tablet Take 1,000 mg by mouth every 6 hours as needed for Pain  Historical Provider, MD   ibuprofen (ADVIL;MOTRIN) 800 MG tablet Take 800 mg by mouth every 6 hours as needed for Pain  Historical Provider, MD   omeprazole (PRILOSEC) 20 MG delayed release capsule Take 1 capsule by mouth daily  Patient taking differently: Take 20 mg by mouth daily as needed   LEIDY Niño       Social History     Tobacco Use    Smoking status: Never Smoker    Smokeless tobacco: Never Used   Substance Use Topics    Alcohol use: Not Currently     Comment: SOCIAL    Drug use: No        Past Medical History:   Diagnosis Date    Anemia     GERD (gastroesophageal reflux disease)     Kidney stones     Postpartum depression        Past Surgical History:   Procedure Laterality Date    EXTERNAL EAR SURGERY Left     LITHOTRIPSY Right 08/25/2020    ESWL EXTRACORPOREAL SHOCK WAVE LITHOTRIPSY     LITHOTRIPSY Right 8/25/2020    ESWL EXTRACORPOREAL SHOCK WAVE LITHOTRIPSY performed by Tonya Chu MD at [facility] Right 5/26/2016    right cysto HLL with stent exchange    UPPER GASTROINTESTINAL ENDOSCOPY      VAGINA SURGERY      LABIA HEMATOMA       Family History   Problem Relation Age of Onset    Ulcerative Colitis Father     Ulcerative Colitis Sister     Asthma Sister     Heart Disease Maternal Grandfather     Stroke Maternal Grandfather     High Blood Pressure Maternal Grandfather     Arthritis Maternal Grandmother     Vision Loss Maternal Grandmother        PHYSICAL EXAMINATION:  Constitutional: [x] Appears well-developed and well-nourished [x] No apparent distress      [] Abnormal-   Mental status  [x] Alert and awake  [x] Oriented to person/place/time [x]Able to follow commands Eyes:  EOM    [x]  Normal  [] Abnormal-  Sclera  [x]  Normal  [] Abnormal -         Discharge [x]  None visible  [] Abnormal -    HENT:   [x] Normocephalic, atraumatic. [] Abnormal   [x] Mouth/Throat: Mucous membranes are moist.     External Ears [x] Normal  [] Abnormal-     Neck: [x] No visualized mass     Pulmonary/Chest: [x] Respiratory effort normal.  [x] No visualized signs of difficulty breathing or respiratory distress        [] Abnormal-      Musculoskeletal:   [x] Normal gait with no signs of ataxia         [x] Normal range of motion of neck        [] Abnormal-       Neurological:        [x] No Facial Asymmetry (Cranial nerve 7 motor function) (limited exam to video visit)          [x] No gaze palsy        [] Abnormal-         Skin:        [x] No significant exanthematous lesions or discoloration noted on facial skin         [] Abnormal-            Psychiatric:       [x] Normal Affect [x] No Hallucinations        [] Abnormal-         ASSESSMENT/PLAN:   Diagnosis Orders   1. Renal calculus  XR ABDOMEN (KUB) (SINGLE AP VIEW)       To prevent future stone formation:  1) drink around 80 ounces of water per day  2) Avoid/minimize intake of \"bad fluids\" (soda pop, coffee, tea, alcohol, energy drinks)  3) reduce consumption of sodium/salt  4) reduce consumption of fatty animal protein (full-fat cheese/milk/dairy, red meats, pork). Limit protein intake to low-fat/lean options (low-fat dairy, fish, chicken, turkey)    She is due in May. We will plan to see her in July with a KUB prior or sooner if needed for new or worsening symptoms. Lucero Vazquez is a 26 y.o. female being evaluated by a Virtual Visit (video visit) encounter to address concerns as mentioned above. A caregiver was present when appropriate.  Due to this being a TeleHealth encounter (During XVRAA-63 public health emergency), evaluation of the following organ systems was limited: Vitals/Constitutional/EENT/Resp/CV/GI//MS/Neuro/Skin/Heme-Lymph-Imm. Pursuant to the emergency declaration under the [address] [place] [address] and the [employer] Act, this Virtual Visit was conducted with patient's (and/or legal guardian's) consent, to reduce the patient's risk of exposure to COVID-19 and provide necessary medical care. The patient (and/or legal guardian) has also been advised to contact this office for worsening conditions or problems, and seek emergency medical treatment and/or call 911 if deemed necessary. Services were provided through a video synchronous discussion virtually to substitute for in-person clinic visit. The patient was located in their home. The provider was located in the office. --FÁTIMA William CNP on 9/30/2020 at 4:03 PM    An electronic signature was used to authenticate this note.

## 2020-10-06 ENCOUNTER — HOSPITAL ENCOUNTER (OUTPATIENT)
Age: 30
Discharge: HOME | End: 2020-10-06
Payer: COMMERCIAL

## 2020-10-06 LAB
ABO, EXTERNAL RESULT: NORMAL
ABO/RH: NORMAL
ABSOLUTE EOS #: 0.04 K/UL (ref 0–0.44)
ABSOLUTE IMMATURE GRANULOCYTE: 0.03 K/UL (ref 0–0.3)
ABSOLUTE LYMPH #: 2.13 K/UL (ref 1.1–3.7)
ABSOLUTE MONO #: 0.42 K/UL (ref 0.1–1.2)
ANTIBODY SCREEN: NEGATIVE
BASOPHILS # BLD: 1 % (ref 0–2)
BASOPHILS ABSOLUTE: 0.05 K/UL (ref 0–0.2)
DIFFERENTIAL TYPE: ABNORMAL
EOSINOPHILS RELATIVE PERCENT: 0 % (ref 1–4)
GLUCOSE ADMINISTRATION: NORMAL
GLUCOSE TOLERANCE SCREEN 50G: 118 MG/DL (ref 70–135)
HCT VFR BLD CALC: 43 % (ref 36.3–47.1)
HEMOGLOBIN: 14.1 G/DL (ref 11.9–15.1)
HEP B, EXTERNAL RESULT: NORMAL
HEPATITIS B SURFACE ANTIGEN: NONREACTIVE
HEPATITIS C ANTIBODY, EXTERNAL RESULT: NORMAL
HEPATITIS C ANTIBODY: NONREACTIVE
HIV, EXTERNAL RESULT: NORMAL
IMMATURE GRANULOCYTES: 0 %
LYMPHOCYTES # BLD: 22 % (ref 24–43)
MCH RBC QN AUTO: 29.7 PG (ref 25.2–33.5)
MCHC RBC AUTO-ENTMCNC: 32.8 G/DL (ref 28.4–34.8)
MCV RBC AUTO: 90.5 FL (ref 82.6–102.9)
MONOCYTES # BLD: 4 % (ref 3–12)
NRBC AUTOMATED: 0 PER 100 WBC
PDW BLD-RTO: 12.5 % (ref 11.8–14.4)
PLATELET # BLD: 233 K/UL (ref 138–453)
PLATELET ESTIMATE: ABNORMAL
PMV BLD AUTO: 10.2 FL (ref 8.1–13.5)
RBC # BLD: 4.75 M/UL (ref 3.95–5.11)
RBC # BLD: ABNORMAL 10*6/UL
RH FACTOR, EXTERNAL RESULT: NORMAL
RPR, EXTERNAL RESULT: NORMAL
RUBELLA TITER, EXTERNAL RESULT: NORMAL
RUBV IGG SER QL: 100 IU/ML
SEG NEUTROPHILS: 73 % (ref 36–65)
SEGMENTED NEUTROPHILS ABSOLUTE COUNT: 7.22 K/UL (ref 1.5–8.1)
T. PALLIDUM, IGG: NONREACTIVE
TSH SERPL DL<=0.05 MIU/L-ACNC: 0.73 MIU/L (ref 0.3–5)
WBC # BLD: 9.9 K/UL (ref 3.5–11.3)
WBC # BLD: ABNORMAL 10*3/UL

## 2020-10-06 PROCEDURE — 86900 BLOOD TYPING SEROLOGIC ABO: CPT

## 2020-10-06 PROCEDURE — 86780 TREPONEMA PALLIDUM: CPT

## 2020-10-06 PROCEDURE — 84443 ASSAY THYROID STIM HORMONE: CPT

## 2020-10-06 PROCEDURE — 36415 COLL VENOUS BLD VENIPUNCTURE: CPT

## 2020-10-06 PROCEDURE — 86762 RUBELLA ANTIBODY: CPT

## 2020-10-06 PROCEDURE — 85025 COMPLETE CBC W/AUTO DIFF WBC: CPT

## 2020-10-06 PROCEDURE — 86850 RBC ANTIBODY SCREEN: CPT

## 2020-10-06 PROCEDURE — 86901 BLOOD TYPING SEROLOGIC RH(D): CPT

## 2020-10-06 PROCEDURE — 82950 GLUCOSE TEST: CPT

## 2020-10-06 PROCEDURE — 87340 HEPATITIS B SURFACE AG IA: CPT

## 2020-10-06 PROCEDURE — 86803 HEPATITIS C AB TEST: CPT

## 2020-11-24 ENCOUNTER — TELEPHONE (OUTPATIENT)
Dept: UROLOGY | Age: 30
End: 2020-11-24

## 2021-01-03 ENCOUNTER — HOSPITAL ENCOUNTER (EMERGENCY)
Age: 31
Discharge: HOME | End: 2021-01-03
Attending: EMERGENCY MEDICINE
Payer: COMMERCIAL

## 2021-01-03 VITALS
SYSTOLIC BLOOD PRESSURE: 106 MMHG | DIASTOLIC BLOOD PRESSURE: 71 MMHG | OXYGEN SATURATION: 97 % | RESPIRATION RATE: 17 BRPM | HEART RATE: 97 BPM

## 2021-01-03 DIAGNOSIS — E86.0 DEHYDRATION: ICD-10-CM

## 2021-01-03 DIAGNOSIS — Z34.92 SECOND TRIMESTER PREGNANCY: ICD-10-CM

## 2021-01-03 DIAGNOSIS — H83.09 ACUTE LABYRINTHITIS, UNSPECIFIED LATERALITY: Primary | ICD-10-CM

## 2021-01-03 LAB
-: ABNORMAL
ABSOLUTE EOS #: 0.05 K/UL (ref 0–0.44)
ABSOLUTE IMMATURE GRANULOCYTE: 0.05 K/UL (ref 0–0.3)
ABSOLUTE LYMPH #: 2.33 K/UL (ref 1.1–3.7)
ABSOLUTE MONO #: 0.67 K/UL (ref 0.1–1.2)
ALBUMIN SERPL-MCNC: 3.4 G/DL (ref 3.5–5.2)
ALBUMIN/GLOBULIN RATIO: 1.1 (ref 1–2.5)
ALP BLD-CCNC: 68 U/L (ref 35–104)
ALT SERPL-CCNC: 5 U/L (ref 5–33)
AMORPHOUS: ABNORMAL
ANION GAP SERPL CALCULATED.3IONS-SCNC: 6 MMOL/L (ref 9–17)
AST SERPL-CCNC: 13 U/L
BACTERIA: ABNORMAL
BASOPHILS # BLD: 0 % (ref 0–2)
BASOPHILS ABSOLUTE: 0.03 K/UL (ref 0–0.2)
BILIRUB SERPL-MCNC: 0.29 MG/DL (ref 0.3–1.2)
BILIRUBIN URINE: NEGATIVE
BUN BLDV-MCNC: 8 MG/DL (ref 6–20)
BUN/CREAT BLD: 15 (ref 9–20)
CALCIUM SERPL-MCNC: 8.8 MG/DL (ref 8.6–10.4)
CASTS UA: ABNORMAL /LPF
CHLORIDE BLD-SCNC: 103 MMOL/L (ref 98–107)
CO2: 24 MMOL/L (ref 20–31)
COLOR: YELLOW
COMMENT UA: ABNORMAL
CREAT SERPL-MCNC: 0.54 MG/DL (ref 0.5–0.9)
CRYSTALS, UA: ABNORMAL /HPF
DIFFERENTIAL TYPE: ABNORMAL
EOSINOPHILS RELATIVE PERCENT: 0 % (ref 1–4)
EPITHELIAL CELLS UA: ABNORMAL /HPF (ref 0–25)
GFR AFRICAN AMERICAN: >60 ML/MIN
GFR NON-AFRICAN AMERICAN: >60 ML/MIN
GFR SERPL CREATININE-BSD FRML MDRD: ABNORMAL ML/MIN/{1.73_M2}
GFR SERPL CREATININE-BSD FRML MDRD: ABNORMAL ML/MIN/{1.73_M2}
GLUCOSE BLD-MCNC: 87 MG/DL (ref 70–99)
GLUCOSE URINE: NEGATIVE
HCT VFR BLD CALC: 36.9 % (ref 36.3–47.1)
HEMOGLOBIN: 12.1 G/DL (ref 11.9–15.1)
IMMATURE GRANULOCYTES: 0 %
KETONES, URINE: ABNORMAL
LEUKOCYTE ESTERASE, URINE: NEGATIVE
LYMPHOCYTES # BLD: 20 % (ref 24–43)
MCH RBC QN AUTO: 30.4 PG (ref 25.2–33.5)
MCHC RBC AUTO-ENTMCNC: 32.8 G/DL (ref 28.4–34.8)
MCV RBC AUTO: 92.7 FL (ref 82.6–102.9)
MONOCYTES # BLD: 6 % (ref 3–12)
MUCUS: ABNORMAL
NITRITE, URINE: NEGATIVE
NRBC AUTOMATED: 0 PER 100 WBC
OTHER OBSERVATIONS UA: ABNORMAL
PDW BLD-RTO: 12.6 % (ref 11.8–14.4)
PH UA: 7 (ref 5–9)
PLATELET # BLD: 215 K/UL (ref 138–453)
PLATELET ESTIMATE: ABNORMAL
PMV BLD AUTO: 10.5 FL (ref 8.1–13.5)
POTASSIUM SERPL-SCNC: 3.9 MMOL/L (ref 3.7–5.3)
PROTEIN UA: NEGATIVE
RBC # BLD: 3.98 M/UL (ref 3.95–5.11)
RBC # BLD: ABNORMAL 10*6/UL
RBC UA: ABNORMAL /HPF (ref 0–2)
RENAL EPITHELIAL, UA: ABNORMAL /HPF
SEG NEUTROPHILS: 74 % (ref 36–65)
SEGMENTED NEUTROPHILS ABSOLUTE COUNT: 8.35 K/UL (ref 1.5–8.1)
SODIUM BLD-SCNC: 133 MMOL/L (ref 135–144)
SPECIFIC GRAVITY UA: 1.02 (ref 1.01–1.02)
TOTAL PROTEIN: 6.5 G/DL (ref 6.4–8.3)
TRICHOMONAS: ABNORMAL
TURBIDITY: CLEAR
URINE HGB: NEGATIVE
UROBILINOGEN, URINE: NORMAL
WBC # BLD: 11.5 K/UL (ref 3.5–11.3)
WBC # BLD: ABNORMAL 10*3/UL
WBC UA: ABNORMAL /HPF (ref 0–5)
YEAST: ABNORMAL

## 2021-01-03 PROCEDURE — 85025 COMPLETE CBC W/AUTO DIFF WBC: CPT

## 2021-01-03 PROCEDURE — 80053 COMPREHEN METABOLIC PANEL: CPT

## 2021-01-03 PROCEDURE — 81001 URINALYSIS AUTO W/SCOPE: CPT

## 2021-01-03 PROCEDURE — 2580000003 HC RX 258: Performed by: EMERGENCY MEDICINE

## 2021-01-03 PROCEDURE — 93005 ELECTROCARDIOGRAM TRACING: CPT | Performed by: EMERGENCY MEDICINE

## 2021-01-03 PROCEDURE — 36415 COLL VENOUS BLD VENIPUNCTURE: CPT

## 2021-01-03 PROCEDURE — 6370000000 HC RX 637 (ALT 250 FOR IP): Performed by: EMERGENCY MEDICINE

## 2021-01-03 PROCEDURE — 96360 HYDRATION IV INFUSION INIT: CPT

## 2021-01-03 PROCEDURE — 99284 EMERGENCY DEPT VISIT MOD MDM: CPT

## 2021-01-03 RX ORDER — OMEPRAZOLE 10 MG/1
10 CAPSULE, DELAYED RELEASE ORAL DAILY
COMMUNITY

## 2021-01-03 RX ORDER — MECLIZINE HCL 12.5 MG/1
25 TABLET ORAL ONCE
Status: COMPLETED | OUTPATIENT
Start: 2021-01-03 | End: 2021-01-03

## 2021-01-03 RX ORDER — 0.9 % SODIUM CHLORIDE 0.9 %
1000 INTRAVENOUS SOLUTION INTRAVENOUS ONCE
Status: COMPLETED | OUTPATIENT
Start: 2021-01-03 | End: 2021-01-03

## 2021-01-03 RX ORDER — MECLIZINE HYDROCHLORIDE 25 MG/1
25 TABLET ORAL 3 TIMES DAILY PRN
Qty: 21 TABLET | Refills: 0 | Status: SHIPPED | OUTPATIENT
Start: 2021-01-03 | End: 2021-01-10

## 2021-01-03 RX ADMIN — SODIUM CHLORIDE 1000 ML: 9 INJECTION, SOLUTION INTRAVENOUS at 19:52

## 2021-01-03 RX ADMIN — MECLIZINE 25 MG: 12.5 TABLET ORAL at 19:52

## 2021-01-03 NOTE — LETTER
[facility] ED  [address] [place] [address] [place] [address]  Phone: [phone]  Fax: [phone]               January 3, 2486    Patient: Yon Rivers   YOB: 1990   Date of Visit: 1/3/2021       To Whom It May Concern:    Nina Never was seen and treated in our emergency department on 1/3/2021. She may return to work on 1/4/2021.       Sincerely,       Kelli Rosa RN         Signature:__________________________________

## 2021-01-04 LAB
EKG ATRIAL RATE: 83 BPM
EKG P AXIS: 29 DEGREES
EKG P-R INTERVAL: 134 MS
EKG Q-T INTERVAL: 362 MS
EKG QRS DURATION: 76 MS
EKG QTC CALCULATION (BAZETT): 425 MS
EKG R AXIS: 29 DEGREES
EKG T AXIS: 19 DEGREES
EKG VENTRICULAR RATE: 83 BPM

## 2021-01-04 PROCEDURE — 93010 ELECTROCARDIOGRAM REPORT: CPT | Performed by: INTERNAL MEDICINE

## 2021-01-04 ASSESSMENT — ENCOUNTER SYMPTOMS
VOMITING: 0
COUGH: 0
APNEA: 0
TROUBLE SWALLOWING: 0
EYE REDNESS: 0
EYE PAIN: 0
SHORTNESS OF BREATH: 0
ABDOMINAL PAIN: 0
SORE THROAT: 0
NAUSEA: 0
RHINORRHEA: 0

## 2021-01-04 NOTE — ED PROVIDER NOTES
[address] [place] [address] ED  Emergency Department        Pt Name: Rossi Lua  MRN: [id number]  [place] 1990  Date of evaluation: 1/3/21    CHIEF COMPLAINT       Chief Complaint   Patient presents with    Nausea     onset yesterday, denies emesis, 21 weeks pregnant    Dizziness     onset today, denies LOC or fall, states \"spinning\"       HISTORY OF PRESENT ILLNESS  (Location/Symptom, Timing/Onset, Context/Setting, Quality, Duration, ModifyingFactors, Severity.)      Rossi Lua is a 31 y.o. female who presents with a 24-hour history of vertigo and nausea. The patient denies any loss of consciousness or fall. She does endorse a spinning sensation from right to left, accelerating from right to left. She denies ataxia but says she is being very careful when she walks. No fever, chills, or sweats. No vaginal bleeding. No abdominal pain. No difficulty breathing. She is a G5, P4 Ab0 patient. No high risk pregnancies. No pregnancy-induced hypertension or preeclampsia with this pregnancy. She does report 1 prior episode of vertigo which resolved on its own prior to this pregnancy when she was not pregnant. She did not seek medical care attention at that time. PAST MEDICAL / SURGICAL / SOCIAL / FAMILY HISTORY      has a past medical history of Anemia, GERD (gastroesophageal reflux disease), Kidney stones, and Postpartum depression. has a past surgical history that includes External ear surgery (Left); Upper gastrointestinal endoscopy; other surgical history (Right, 5/26/2016); Vagina surgery; Lithotripsy (Right, 08/25/2020); and Lithotripsy (Right, 8/25/2020).        Social History     Socioeconomic History    Marital status:      Spouse name: Not on file    Number of children: Not on file    Years of education: Not on file    Highest education level: Not on file   Occupational History    Not on file   Social Needs    Financial resource strain: Not on file   [employer] insecurity     Worry: Not on file     Inability: Not on file    Transportation needs     Medical: Not on file     Non-medical: Not on file   Tobacco Use    Smoking status: Never Smoker    Smokeless tobacco: Never Used   Substance and Sexual Activity    Alcohol use: Not Currently     Comment: SOCIAL    Drug use: No    Sexual activity: Yes     Partners: Male   Lifestyle    Physical activity     Days per week: Not on file     Minutes per session: Not on file    Stress: Not on file   Relationships    Social connections     Talks on phone: Not on file     Gets together: Not on file     Attends [employer] service: Not on file     Active member of club or organization: Not on file     Attends meetings of clubs or organizations: Not on file     Relationship status: Not on file    Intimate partner violence     Fear of current or ex partner: Not on file     Emotionally abused: Not on file     Physically abused: Not on file     Forced sexual activity: Not on file   Other Topics Concern    Not on file   Social History Narrative    Not on file       Family History   Problem Relation Age of Onset    Ulcerative Colitis Father     Ulcerative Colitis Sister     Asthma Sister     Heart Disease Maternal Grandfather     Stroke Maternal Grandfather     High Blood Pressure Maternal Grandfather     Arthritis Maternal Grandmother     Vision Loss Maternal Grandmother        Allergies:  Reglan [metoclopramide] and Norco [hydrocodone-acetaminophen]    Home Medications:  Prior to Admission medications    Medication Sig Start Date End Date Taking?  Authorizing Provider   omeprazole (PRILOSEC) 10 MG delayed release capsule Take 10 mg by mouth daily   Yes Historical Provider, MD   Prenatal Vit-Fe Fum-FA-Omega (PRENATAL FORMULA PO) Take by mouth   Yes Historical Provider, MD   meclizine (ANTIVERT) 25 MG tablet Take 1 tablet by mouth 3 times daily as needed for Dizziness 1/3/21 1/10/21 Yes Alicia Pugh MD   promethazine (PHENERGAN) 12.5 MG tablet Take 1 tablet by mouth every 6 hours as needed for Nausea 9/30/20  Yes FÁTIMA Trinidad CNP   acetaminophen (TYLENOL) 500 MG tablet Take 1,000 mg by mouth every 6 hours as needed for Pain    Historical Provider, MD     I reviewed the patient's past medical and surgical history gathered by the nurses as well as all medications and allergies. I reviewed the patient's family history gathered by the nurses. I also reviewed the social history gathered by the nurses for smoking, vaping, and alcohol use, as well as drugs of abuse. Nursing notes are reviewed by me and greatly appreciated. REVIEW OF SYSTEMS    (2-9 systems for level 4, 10 or more for level 5)      Review of Systems   Constitutional: Positive for appetite change. Negative for activity change and fever. HENT: Negative for congestion, ear discharge, ear pain, hearing loss, rhinorrhea, sore throat, tinnitus and trouble swallowing. Eyes: Negative for pain and redness. Respiratory: Negative for apnea, cough and shortness of breath. Cardiovascular: Negative for chest pain and palpitations. Gastrointestinal: Negative for abdominal pain, nausea and vomiting. Genitourinary: Negative for decreased urine volume, difficulty urinating, dysuria and urgency. Musculoskeletal: Negative for arthralgias and myalgias. Neurological: Positive for dizziness. Negative for weakness and light-headedness. Hematological: Negative for adenopathy. Psychiatric/Behavioral: Negative for confusion and dysphoric mood. All other systems reviewed and are negative. PHYSICAL EXAM   (up to 7 for level 4, 8 or more for level 5)     INITIAL VITALS:   /71   Pulse 97   Resp 17   LMP 08/09/2020   SpO2 97%     Physical Exam  Vitals signs and nursing note reviewed. Constitutional:       General: She is in acute distress. Appearance: Normal appearance. She is ill-appearing. She is not toxic-appearing or diaphoretic. HENT:      Head: Normocephalic and atraumatic. Comments: No nystagmus on lateral gaze. No vertical nystagmus or rotary nystagmus. Turning head to right produces increased vertigo symptoms but no nystagmus. Right Ear: External ear normal. There is no impacted cerumen. Left Ear: External ear normal. There is no impacted cerumen. Nose: Nose normal. No rhinorrhea. Mouth/Throat:      Mouth: Mucous membranes are moist.      Pharynx: No posterior oropharyngeal erythema. Eyes:      General: No scleral icterus. Right eye: No discharge. Left eye: No discharge. Pupils: Pupils are equal, round, and reactive to light. Neck:      Musculoskeletal: Normal range of motion and neck supple. No muscular tenderness. Cardiovascular:      Rate and Rhythm: Normal rate and regular rhythm. Pulses: Normal pulses. Heart sounds: Normal heart sounds. No murmur. No gallop. Pulmonary:      Effort: Pulmonary effort is normal. No respiratory distress. Breath sounds: Normal breath sounds. Abdominal:      Palpations: Abdomen is soft. Tenderness: There is no abdominal tenderness. There is no guarding or rebound. Musculoskeletal:         General: No swelling, tenderness, deformity or signs of injury. Right lower leg: No edema. Left lower leg: No edema. Lymphadenopathy:      Cervical: No cervical adenopathy. Skin:     General: Skin is warm and dry. Findings: No bruising or erythema. Neurological:      General: No focal deficit present. Mental Status: She is alert and oriented to person, place, and time. Motor: No weakness. Coordination: Coordination normal.      Comments: No finger to finger/finger-to-nose limb dysmetria or past pointing. No ataxia.    Psychiatric:         Mood and Affect: Mood normal.         Behavior: Behavior normal.         DIFFERENTIAL  DIAGNOSIS     PLAN (LABS / IMAGING / EKG):  Orders Placed This Encounter Procedures    CBC auto differential    Comprehensive Metabolic Panel    Urinalysis Reflex to Culture    Microscopic Urinalysis    EKG 12 Lead       MEDICATIONS ORDERED:  Orders Placed This Encounter   Medications    meclizine (ANTIVERT) tablet 25 mg    0.9 % sodium chloride bolus    meclizine (ANTIVERT) 25 MG tablet     Sig: Take 1 tablet by mouth 3 times daily as needed for Dizziness     Dispense:  21 tablet     Refill:  0       DIAGNOSTIC RESULTS / EMERGENCY DEPARTMENT COURSE / MDM     LABS:  Results for orders placed or performed during the hospital encounter of 01/03/21   CBC auto differential   Result Value Ref Range    WBC 11.5 (H) 3.5 - 11.3 k/uL    RBC 3.98 3.95 - 5.11 m/uL    Hemoglobin 12.1 11.9 - 15.1 g/dL    Hematocrit 36.9 36.3 - 47.1 %    MCV 92.7 82.6 - 102.9 fL    MCH 30.4 25.2 - 33.5 pg    MCHC 32.8 28.4 - 34.8 g/dL    RDW 12.6 11.8 - 14.4 %    Platelets 809 001 - 891 k/uL    MPV 10.5 8.1 - 13.5 fL    NRBC Automated 0.0 0.0 per 100 WBC    Differential Type NOT REPORTED     Seg Neutrophils 74 (H) 36 - 65 %    Lymphocytes 20 (L) 24 - 43 %    Monocytes 6 3 - 12 %    Eosinophils % 0 (L) 1 - 4 %    Basophils 0 0 - 2 %    Immature Granulocytes 0 0 %    Segs Absolute 8.35 (H) 1.50 - 8.10 k/uL    Absolute Lymph # 2.33 1.10 - 3.70 k/uL    Absolute Mono # 0.67 0.10 - 1.20 k/uL    Absolute Eos # 0.05 0.00 - 0.44 k/uL    Basophils Absolute 0.03 0.00 - 0.20 k/uL    Absolute Immature Granulocyte 0.05 0.00 - 0.30 k/uL    WBC Morphology NOT REPORTED     RBC Morphology NOT REPORTED     Platelet Estimate NOT REPORTED    Comprehensive Metabolic Panel   Result Value Ref Range    Glucose 87 70 - 99 mg/dL    BUN 8 6 - 20 mg/dL    CREATININE 0.54 0.50 - 0.90 mg/dL    Bun/Cre Ratio 15 9 - 20    Calcium 8.8 8.6 - 10.4 mg/dL    Sodium 133 (L) 135 - 144 mmol/L    Potassium 3.9 3.7 - 5.3 mmol/L    Chloride 103 98 - 107 mmol/L    CO2 24 20 - 31 mmol/L    Anion Gap 6 (L) 9 - 17 mmol/L    Alkaline Phosphatase 68 35 - 104 U/L    ALT 5 5 - 33 U/L    AST 13 <32 U/L    Total Bilirubin 0.29 (L) 0.3 - 1.2 mg/dL    Total Protein 6.5 6.4 - 8.3 g/dL    Alb 3.4 (L) 3.5 - 5.2 g/dL    Albumin/Globulin Ratio 1.1 1.0 - 2.5    GFR Non-African American >60 >60 mL/min    GFR African American >60 >60 mL/min    GFR Comment          GFR Staging         Urinalysis Reflex to Culture    Specimen: Urine, clean catch   Result Value Ref Range    Color, UA YELLOW YELLOW    Turbidity UA CLEAR CLEAR    Glucose, Ur NEGATIVE NEGATIVE    Bilirubin Urine NEGATIVE NEGATIVE    Ketones, Urine 1+ (A) NEGATIVE    Specific Gravity, UA 1.020 1.010 - 1.020    Urine Hgb NEGATIVE NEGATIVE    pH, UA 7.0 5.0 - 9.0    Protein, UA NEGATIVE NEGATIVE    Urobilinogen, Urine Normal Normal    Nitrite, Urine NEGATIVE NEGATIVE    Leukocyte Esterase, Urine NEGATIVE NEGATIVE    Urinalysis Comments NOT REPORTED    Microscopic Urinalysis   Result Value Ref Range    -          WBC, UA 0 TO 2 0 - 5 /HPF    RBC, UA 0 TO 2 0 - 2 /HPF    Casts UA NOT REPORTED /LPF    Crystals, UA NOT REPORTED None /HPF    Epithelial Cells UA 0 TO 2 0 - 25 /HPF    Renal Epithelial, UA NOT REPORTED 0 /HPF    Bacteria, UA TRACE (A) None    Mucus, UA NOT REPORTED None    Trichomonas, UA NOT REPORTED None    Amorphous, UA NOT REPORTED None    Other Observations UA NOT REPORTED NOT REQ. Yeast, UA NOT REPORTED None   EKG 12 Lead   Result Value Ref Range    Ventricular Rate 83 BPM    Atrial Rate 83 BPM    P-R Interval 134 ms    QRS Duration 76 ms    Q-T Interval 362 ms    QTc Calculation (Bazett) 425 ms    P Axis 29 degrees    R Axis 29 degrees    T Axis 19 degrees       IMPRESSION:   1. Acute labyrinthitis, unspecified laterality    2. Second trimester pregnancy    3. Dehydration          RADIOLOGY:  N/A    EKG:    Electrocardiogram:  EKG # 1  Ordered, reviewed, and independently interpreted by the EP. Time Interpreted: 1922 hrs.   EKG interpreted by me in the absence of the cardiologist contemporaneously with patient care shows normal sinus rhythm rate of 83 bpm, normal AZ 0.13, normal QTC 0.43, normal axis +29. No STEMI. No bundle branch block pattern. No acute ischemia. POC ULTRASOUND:  N/A    EMERGENCY DEPARTMENT COURSE:    Time: 9:22 PM EST  The patient is feeling better here. She states the vertigo is subsiding significantly. She can move her head better presently. She will be discharged home on meclizine 25 mg p.o. 3 times daily and is to follow-up with her OB/GYN provider in the next few days and call Monday for an appointment. Suitable for outpatient management and disposition home with close PCP follow-up as discussed. Discussed results and discharge plan with patient and/or family/friend. I emphasized the importance of follow up with their physician. Explained reasons to return to the ED. All questions and concerns were answered and addressed. The patient and/or family member/friend expressed understanding. PROCEDURES:  None. CONSULTS:  None    CRITICAL CARE:  None    FINAL IMPRESSION      1. Acute labyrinthitis, unspecified laterality    2. Second trimester pregnancy    3.  Dehydration          DISPOSITION / PLAN     DISPOSITION        PATIENT REFERRED TO:  FÁTIMA Guzmán  [address]  [phone]    In 1 week      your ob/gyn provider    In 3 days        DISCHARGE MEDICATIONS:  Discharge Medication List as of 1/3/2021  9:30 PM      START taking these medications    Details   meclizine (ANTIVERT) 25 MG tablet Take 1 tablet by mouth 3 times daily as needed for Dizziness, Disp-21 tablet, R-0Print             Gabbi Clarke MD, [facility]  9:21 PM    Attending Emergency Physician  [facility] ED    (Please note that portions of this note were completed with a voice recognition program.  Effortswere made to edit the dictations but occasionally words are mis-transcribed.)             Gabbi Clarke MD  01/04/21 [employer] [email or web address] Antoine Andrews MD  01/04/21 [id number]

## 2021-02-23 ENCOUNTER — HOSPITAL ENCOUNTER (EMERGENCY)
Age: 31
Discharge: HOME | End: 2021-02-23
Attending: EMERGENCY MEDICINE
Payer: COMMERCIAL

## 2021-02-23 VITALS
SYSTOLIC BLOOD PRESSURE: 106 MMHG | RESPIRATION RATE: 16 BRPM | TEMPERATURE: 97 F | WEIGHT: 155 LBS | HEART RATE: 106 BPM | BODY MASS INDEX: 27.46 KG/M2 | DIASTOLIC BLOOD PRESSURE: 68 MMHG | OXYGEN SATURATION: 97 %

## 2021-02-23 DIAGNOSIS — U07.1 COVID-19 VIRUS INFECTION: Primary | ICD-10-CM

## 2021-02-23 LAB
DIRECT EXAM: NORMAL
Lab: NORMAL
SARS-COV-2, RAPID: DETECTED
SPECIMEN DESCRIPTION: ABNORMAL
SPECIMEN DESCRIPTION: NORMAL

## 2021-02-23 PROCEDURE — 87804 INFLUENZA ASSAY W/OPTIC: CPT

## 2021-02-23 PROCEDURE — U0002 COVID-19 LAB TEST NON-CDC: HCPCS

## 2021-02-23 PROCEDURE — C9803 HOPD COVID-19 SPEC COLLECT: HCPCS

## 2021-02-23 PROCEDURE — 99284 EMERGENCY DEPT VISIT MOD MDM: CPT

## 2021-02-23 ASSESSMENT — PAIN SCALES - GENERAL
PAINLEVEL_OUTOF10: 6
PAINLEVEL_OUTOF10: 6

## 2021-02-23 ASSESSMENT — ENCOUNTER SYMPTOMS
ABDOMINAL PAIN: 0
VOMITING: 0
CONSTIPATION: 0
DIARRHEA: 0
COLOR CHANGE: 0
COUGH: 1
BACK PAIN: 0
SHORTNESS OF BREATH: 0
WHEEZING: 0
NAUSEA: 0
ABDOMINAL DISTENTION: 0

## 2021-02-23 ASSESSMENT — PAIN DESCRIPTION - LOCATION: LOCATION: BACK

## 2021-02-23 ASSESSMENT — PAIN DESCRIPTION - DESCRIPTORS: DESCRIPTORS: ACHING

## 2021-02-23 ASSESSMENT — PAIN DESCRIPTION - FREQUENCY: FREQUENCY: CONTINUOUS

## 2021-02-23 NOTE — LETTER
[facility] ED  [address] [place] [address] [place] [address]  Phone: [phone]  Fax: [phone]             February 23, 6385    Patient: Bri Zepeda   YOB: 1990   Date of Visit: 2/23/2021       To Whom It May Concern:    Barbara Dennis was seen and treated in our emergency department on 2/23/2021. She may return to work per facility protocol.       Sincerely,             Signature:__________________________________

## 2021-02-23 NOTE — LETTER
[facility] ED  [address] [place] [address] [place] [address]  Phone: [phone]  Fax: [phone]             February 23, 5302    Patient: Bryanna Figueroa   YOB: 1990   Date of Visit: 2/23/2021       To Whom It May Concern:    Redd Pickens was seen and treated in our emergency department on 2/23/2021. She and her family must isolate per Health Department guidlines.       Sincerely,             Signature:__________________________________

## 2021-02-23 NOTE — ED PROVIDER NOTES
(gastroesophageal reflux disease)     Kidney stones     Postpartum depression          SURGICAL HISTORY       Past Surgical History:   Procedure Laterality Date    EXTERNAL EAR SURGERY Left     LITHOTRIPSY Right 08/25/2020    ESWL EXTRACORPOREAL SHOCK WAVE LITHOTRIPSY     LITHOTRIPSY Right 8/25/2020    ESWL EXTRACORPOREAL SHOCK WAVE LITHOTRIPSY performed by Katie Mcneal MD at [place] [address] Right 5/26/2016    right cysto HLL with stent exchange    UPPER GASTROINTESTINAL ENDOSCOPY      VAGINA SURGERY      LABIA HEMATOMA         CURRENT MEDICATIONS       Current Discharge Medication List      CONTINUE these medications which have NOT CHANGED    Details   omeprazole (PRILOSEC) 10 MG delayed release capsule Take 10 mg by mouth daily      Prenatal Vit-Fe Fum-FA-Omega (PRENATAL FORMULA PO) Take by mouth      acetaminophen (TYLENOL) 500 MG tablet Take 1,000 mg by mouth every 6 hours as needed for Pain      promethazine (PHENERGAN) 12.5 MG tablet Take 1 tablet by mouth every 6 hours as needed for Nausea  Qty: 10 tablet, Refills: 0             ALLERGIES     Reglan [metoclopramide] and Norco [hydrocodone-acetaminophen]    FAMILY HISTORY       Family History   Problem Relation Age of Onset    Ulcerative Colitis Father     Ulcerative Colitis Sister     Asthma Sister     Heart Disease Maternal Grandfather     Stroke Maternal Grandfather     High Blood Pressure Maternal Grandfather     Arthritis Maternal Grandmother     Vision Loss Maternal Grandmother           SOCIAL HISTORY       Social History     Socioeconomic History    Marital status:      Spouse name: None    Number of children: None    Years of education: None    Highest education level: None   Occupational History    None   Social Needs    Financial resource strain: None    Food insecurity     Worry: None     Inability: None    Transportation needs     Medical: None     Non-medical: None   Tobacco Use    Smoking status: Never Smoker    Smokeless tobacco: Never Used   Substance and Sexual Activity    Alcohol use: Not Currently     Comment: SOCIAL    Drug use: No    Sexual activity: Yes     Partners: Male   Lifestyle    Physical activity     Days per week: None     Minutes per session: None    Stress: None   Relationships    Social connections     Talks on phone: None     Gets together: None     Attends Yazdanism service: None     Active member of club or organization: None     Attends meetings of clubs or organizations: None     Relationship status: None    Intimate partner violence     Fear of current or ex partner: None     Emotionally abused: None     Physically abused: None     Forced sexual activity: None   Other Topics Concern    None   Social History Narrative    None       SCREENINGS    Atlantic Coma Scale  Eye Opening: Spontaneous  Best Verbal Response: Oriented  Best Motor Response: Obeys commands  Atlantic Coma Scale Score: 15        PHYSICAL EXAM  (up to 7 for level 4, 8 or more for level 5)     ED Triage Vitals [02/23/21 0941]   BP Temp Temp Source Pulse Resp SpO2 Height Weight   118/66 97 °F (36.1 °C) Tympanic 107 16 99 % -- 155 lb (70.3 kg)       Physical Exam  Constitutional:       Appearance: Normal appearance. She is normal weight. HENT:      Head: Normocephalic and atraumatic. Right Ear: Tympanic membrane, ear canal and external ear normal.      Left Ear: Tympanic membrane, ear canal and external ear normal.      Mouth/Throat:      Mouth: Mucous membranes are moist.      Pharynx: Oropharynx is clear. Eyes:      Extraocular Movements: Extraocular movements intact. Conjunctiva/sclera: Conjunctivae normal.      Pupils: Pupils are equal, round, and reactive to light. Neck:      Musculoskeletal: Normal range of motion and neck supple. Cardiovascular:      Rate and Rhythm: Normal rate and regular rhythm. Pulses: Normal pulses. Heart sounds: Normal heart sounds.    Pulmonary: Effort: Pulmonary effort is normal.      Breath sounds: Normal breath sounds. Abdominal:      General: Abdomen is flat. Bowel sounds are normal.      Palpations: Abdomen is soft. Musculoskeletal: Normal range of motion. Skin:     General: Skin is warm and dry. Neurological:      General: No focal deficit present. Mental Status: She is alert and oriented to person, place, and time. Mental status is at baseline. Psychiatric:         Mood and Affect: Mood normal.         Behavior: Behavior normal.         Thought Content:  Thought content normal.         Judgment: Judgment normal.         DIAGNOSTIC RESULTS     EKG: All EKG's are interpreted by the Emergency Department Physician whoeither signs or Co-signs this chart in the absence of a cardiologist.      RADIOLOGY:   Non-plain film images such as CT, Ultrasound and MRI are read by the radiologist. Plain radiographic images are visualized and preliminarily interpreted by the emergency physician     Interpretation per the Radiologist below, if available at the time of this note:    No orders to display         ED BEDSIDE ULTRASOUND:   Performed by ED Physician - none    LABS:  Labs Reviewed   COVID-19, RAPID - Abnormal; Notable for the following components:       Result Value    SARS-CoV-2, Rapid DETECTED (*)     All other components within normal limits   RAPID INFLUENZA A/B ANTIGENS       EMERGENCY DEPARTMENT COURSE and DIFFERENTIAL DIAGNOSIS/MDM:   Vitals:    Vitals:    02/23/21 0941 02/23/21 0949 02/23/21 0951   BP: 118/66 106/68    Pulse: 107  106   Resp: 16     Temp: 97 °F (36.1 °C)     TempSrc: Tympanic     SpO2: 99% 98%    Weight: 155 lb (70.3 kg)             MDM    CONSULTS:  None    PROCEDURES:  Unless otherwise noted below, none     Procedures    FINAL IMPRESSION      1. COVID-19 virus infection          DISPOSITION/PLAN   DISPOSITION Decision To Discharge 02/23/2021 10:36:29 AM      PATIENT REFERRED TO:  Farheen Hassan, APRN - [address]  [facility] [address]  [phone]      As needed      DISCHARGE MEDICATIONS:  Current Discharge Medication List                 (Please note that portions ofthis note were completed with a voice recognition program.  Efforts were made to edit the dictations but occasionally words are mis-transcribed.)      Can Ling MD (electronically signed)  Attending Emergency Physician          Shon Brooks MD  02/23/21 1038

## 2021-02-24 ENCOUNTER — CARE COORDINATION (OUTPATIENT)
Dept: CARE COORDINATION | Age: 31
End: 2021-02-24

## 2021-02-24 NOTE — CARE COORDINATION
patient on any new and changed medications related to discharge diagnosis     Patient/family/caregiver given information for GetWell Loop and agrees to enroll yes  Patient's preferred e-mail: [email or web address]   Patient's preferred phone number: [phone]  Based on Loop alert triggers, patient will be contacted by nurse care manager for worsening symptoms. Pt will be further monitored by COVID Loop Team based on severity of symptoms and risk factors.

## 2021-02-24 NOTE — ACP (ADVANCE CARE PLANNING)
Advance Care Planning   Healthcare Decision Maker:    Primary Decision Maker: Tianna Davison - [phone]    Click here to complete Healthcare Decision Makers including selection of the Healthcare Decision Maker Relationship (ie \"Primary\"). Today we documented Decision Maker(s) consistent with Legal Next of Kin hierarchy.

## 2021-02-26 ENCOUNTER — CARE COORDINATION (OUTPATIENT)
Dept: CARE COORDINATION | Age: 31
End: 2021-02-26

## 2021-02-26 NOTE — CARE COORDINATION
Date/Time:  2021 8:56 AM    Patient contacted regarding remote symptom monitoring program alert or comment received. Verified patients name and  as identifiers. Discussed COVID-19 related testing which was available at this time. Test results were positive. Patient informed of results, if available? Yes    RN contacted the patient by telephone regarding yellow alert in Loop remote symptom monitoring program.    Patient reported the following symptoms: cough and nasal/sinus congestion; headache. Due to continued symptoms, patient was advised to contact provider. Due to no new or worsening symptoms encounter was not routed to provider for escalation. Education provided regarding infection prevention, and signs and symptoms of COVID-19 and when to seek medical attention with patient who verbalized understanding. Discussed exposure protocols and quarantine from [address] you at higher risk for severe illness  and given an opportunity for questions and concerns. The patient agrees to contact the [facility] exposure line [phone], [facility] department [place] Department of Health: ([phone]) and PCP office for questions related to their healthcare. From CDC: Are you at higher risk for severe illness?  Wash your hands often.  Avoid close contact (6 feet, which is about two arm lengths) with people who are sick.  Put distance between yourself and other people if COVID-19 is spreading in your community.  Clean and disinfect frequently touched surfaces.  Avoid all cruise travel and non-essential air travel.  Call your healthcare professional if you have concerns about COVID-19 and your underlying condition or if you are sick. For more information on steps you can take to protect yourself, see CDC's How to Protect Yourself      Patient will continue to self report symptoms using Loop self monitoring. Patient has RN's contact information.      Patient states her cough is worse. She said it's a deep, dry cough, like a bronchitis. She also has increased nasal/sinus congestion. She hasn't had a fever since 2/24. She is taking Tylenol for headache. She is using a humidifier at night to help with the dryness. Her oxygen saturation is 94%. She was not told what she could take for her cough. She is about 29 weeks pregnant. Suggested trying hot tea, with honey and lemon. Encouraged her to call her OB or pharmacist to see what she could take for the cough and sinus congestion, if anything. Encouraged a follow up visit with her OB or family doctor. Instructed her to go to the ED if her oxygen level goes below 90% and stays there, or she develops increased shortness of breath, or dizziness. Encouraged continued check ins on the Loop.

## 2021-03-16 ENCOUNTER — HOSPITAL ENCOUNTER (OUTPATIENT)
Age: 31
Discharge: HOME | End: 2021-03-16
Payer: COMMERCIAL

## 2021-03-16 LAB
GLUCOSE ADMINISTRATION: ABNORMAL
GLUCOSE TOLERANCE SCREEN 50G: 185 MG/DL (ref 70–135)

## 2021-03-16 PROCEDURE — 82950 GLUCOSE TEST: CPT

## 2021-03-16 PROCEDURE — 36415 COLL VENOUS BLD VENIPUNCTURE: CPT

## 2021-03-18 ENCOUNTER — HOSPITAL ENCOUNTER (OUTPATIENT)
Age: 31
Discharge: HOME | End: 2021-03-18
Payer: COMMERCIAL

## 2021-03-18 LAB
-: ABNORMAL
AMORPHOUS: ABNORMAL
AMOUNT GLUCOSE GIVEN: 100 G
BACTERIA: ABNORMAL
BILIRUBIN URINE: NEGATIVE
CASTS UA: ABNORMAL /LPF
COLOR: YELLOW
COMMENT UA: ABNORMAL
CRYSTALS, UA: ABNORMAL /HPF
EPITHELIAL CELLS UA: ABNORMAL /HPF (ref 0–25)
GLUCOSE FASTING: 93 MG/DL (ref 65–99)
GLUCOSE TOLERANCE TEST 1 HOUR: 181 MG/DL (ref 65–184)
GLUCOSE TOLERANCE TEST 2 HOUR: 164 MG/DL (ref 65–139)
GLUCOSE TOLERANCE TEST 3 HOUR: 121 MG/DL (ref 65–130)
GLUCOSE URINE: NEGATIVE
KETONES, URINE: NEGATIVE
LEUKOCYTE ESTERASE, URINE: ABNORMAL
MUCUS: ABNORMAL
NITRITE, URINE: NEGATIVE
OTHER OBSERVATIONS UA: ABNORMAL
PH UA: 7 (ref 5–9)
PROTEIN UA: NEGATIVE
RBC UA: ABNORMAL /HPF (ref 0–2)
RENAL EPITHELIAL, UA: ABNORMAL /HPF
SPECIFIC GRAVITY UA: 1.01 (ref 1.01–1.02)
TRICHOMONAS: ABNORMAL
TURBIDITY: CLEAR
URINE HGB: NEGATIVE
UROBILINOGEN, URINE: NORMAL
WBC UA: ABNORMAL /HPF (ref 0–5)
YEAST: ABNORMAL

## 2021-03-18 PROCEDURE — 36415 COLL VENOUS BLD VENIPUNCTURE: CPT

## 2021-03-18 PROCEDURE — 82952 GTT-ADDED SAMPLES: CPT

## 2021-03-18 PROCEDURE — 81001 URINALYSIS AUTO W/SCOPE: CPT

## 2021-03-18 PROCEDURE — 82951 GLUCOSE TOLERANCE TEST (GTT): CPT

## 2021-04-19 ENCOUNTER — HOSPITAL ENCOUNTER (OUTPATIENT)
Age: 31
Setting detail: OBSERVATION
Discharge: HOME | End: 2021-04-19
Attending: OBSTETRICS & GYNECOLOGY | Admitting: OBSTETRICS & GYNECOLOGY
Payer: COMMERCIAL

## 2021-04-19 VITALS
DIASTOLIC BLOOD PRESSURE: 62 MMHG | BODY MASS INDEX: 28.7 KG/M2 | TEMPERATURE: 98.4 F | RESPIRATION RATE: 16 BRPM | HEART RATE: 88 BPM | WEIGHT: 162 LBS | SYSTOLIC BLOOD PRESSURE: 106 MMHG | HEIGHT: 63 IN

## 2021-04-19 PROBLEM — O47.9 UTERINE CONTRACTIONS DURING PREGNANCY: Status: ACTIVE | Noted: 2021-04-19

## 2021-04-19 LAB
-: ABNORMAL
ABSOLUTE EOS #: 0.07 K/UL (ref 0–0.44)
ABSOLUTE IMMATURE GRANULOCYTE: 0.08 K/UL (ref 0–0.3)
ABSOLUTE LYMPH #: 2.38 K/UL (ref 1.1–3.7)
ABSOLUTE MONO #: 0.78 K/UL (ref 0.1–1.2)
AMORPHOUS: ABNORMAL
BACTERIA: ABNORMAL
BASOPHILS # BLD: 0 % (ref 0–2)
BASOPHILS ABSOLUTE: 0.03 K/UL (ref 0–0.2)
BILIRUBIN URINE: NEGATIVE
CASTS UA: ABNORMAL /LPF
COLOR: YELLOW
COMMENT UA: ABNORMAL
CRYSTALS, UA: ABNORMAL /HPF
DIFFERENTIAL TYPE: ABNORMAL
EOSINOPHILS RELATIVE PERCENT: 1 % (ref 1–4)
EPITHELIAL CELLS UA: ABNORMAL /HPF (ref 0–25)
GLUCOSE URINE: NEGATIVE
HCT VFR BLD CALC: 33.7 % (ref 36.3–47.1)
HEMOGLOBIN: 10.7 G/DL (ref 11.9–15.1)
IMMATURE GRANULOCYTES: 1 %
KETONES, URINE: NEGATIVE
LEUKOCYTE ESTERASE, URINE: ABNORMAL
LYMPHOCYTES # BLD: 22 % (ref 24–43)
MCH RBC QN AUTO: 27.5 PG (ref 25.2–33.5)
MCHC RBC AUTO-ENTMCNC: 31.8 G/DL (ref 28.4–34.8)
MCV RBC AUTO: 86.6 FL (ref 82.6–102.9)
MONOCYTES # BLD: 7 % (ref 3–12)
MUCUS: ABNORMAL
NITRITE, URINE: NEGATIVE
NRBC AUTOMATED: 0 PER 100 WBC
OTHER OBSERVATIONS UA: ABNORMAL
PDW BLD-RTO: 14.3 % (ref 11.8–14.4)
PH UA: 7 (ref 5–9)
PLATELET # BLD: 147 K/UL (ref 138–453)
PLATELET ESTIMATE: ABNORMAL
PMV BLD AUTO: 11.7 FL (ref 8.1–13.5)
PROTEIN UA: NEGATIVE
RBC # BLD: 3.89 M/UL (ref 3.95–5.11)
RBC # BLD: ABNORMAL 10*6/UL
RBC UA: ABNORMAL /HPF (ref 0–2)
RENAL EPITHELIAL, UA: ABNORMAL /HPF
SEG NEUTROPHILS: 69 % (ref 36–65)
SEGMENTED NEUTROPHILS ABSOLUTE COUNT: 7.63 K/UL (ref 1.5–8.1)
SPECIFIC GRAVITY UA: 1.01 (ref 1.01–1.02)
TRICHOMONAS: ABNORMAL
TURBIDITY: CLEAR
URINE HGB: ABNORMAL
UROBILINOGEN, URINE: NORMAL
WBC # BLD: 11 K/UL (ref 3.5–11.3)
WBC # BLD: ABNORMAL 10*3/UL
WBC UA: ABNORMAL /HPF (ref 0–5)
YEAST: ABNORMAL

## 2021-04-19 PROCEDURE — 90471 IMMUNIZATION ADMIN: CPT | Performed by: OBSTETRICS & GYNECOLOGY

## 2021-04-19 PROCEDURE — 87081 CULTURE SCREEN ONLY: CPT

## 2021-04-19 PROCEDURE — 81001 URINALYSIS AUTO W/SCOPE: CPT

## 2021-04-19 PROCEDURE — G0378 HOSPITAL OBSERVATION PER HR: HCPCS

## 2021-04-19 PROCEDURE — 6360000002 HC RX W HCPCS: Performed by: OBSTETRICS & GYNECOLOGY

## 2021-04-19 PROCEDURE — 90715 TDAP VACCINE 7 YRS/> IM: CPT | Performed by: OBSTETRICS & GYNECOLOGY

## 2021-04-19 PROCEDURE — 85025 COMPLETE CBC W/AUTO DIFF WBC: CPT

## 2021-04-19 PROCEDURE — 36415 COLL VENOUS BLD VENIPUNCTURE: CPT

## 2021-04-19 PROCEDURE — 96361 HYDRATE IV INFUSION ADD-ON: CPT

## 2021-04-19 PROCEDURE — 59025 FETAL NON-STRESS TEST: CPT

## 2021-04-19 PROCEDURE — 2580000003 HC RX 258: Performed by: OBSTETRICS & GYNECOLOGY

## 2021-04-19 PROCEDURE — 96360 HYDRATION IV INFUSION INIT: CPT

## 2021-04-19 PROCEDURE — 96372 THER/PROPH/DIAG INJ SC/IM: CPT

## 2021-04-19 PROCEDURE — 6370000000 HC RX 637 (ALT 250 FOR IP): Performed by: OBSTETRICS & GYNECOLOGY

## 2021-04-19 RX ORDER — FERROUS SULFATE 325(65) MG
325 TABLET ORAL
COMMUNITY
End: 2021-07-16

## 2021-04-19 RX ORDER — NIFEDIPINE 10 MG/1
20 CAPSULE ORAL EVERY 6 HOURS SCHEDULED
Status: DISCONTINUED | OUTPATIENT
Start: 2021-04-19 | End: 2021-04-19

## 2021-04-19 RX ORDER — BETAMETHASONE SODIUM PHOSPHATE AND BETAMETHASONE ACETATE 3; 3 MG/ML; MG/ML
12 INJECTION, SUSPENSION INTRA-ARTICULAR; INTRALESIONAL; INTRAMUSCULAR; SOFT TISSUE ONCE
Status: COMPLETED | OUTPATIENT
Start: 2021-04-19 | End: 2021-04-19

## 2021-04-19 RX ORDER — NIFEDIPINE 10 MG/1
20 CAPSULE ORAL EVERY 6 HOURS SCHEDULED
Status: DISCONTINUED | OUTPATIENT
Start: 2021-04-19 | End: 2021-04-19 | Stop reason: HOSPADM

## 2021-04-19 RX ORDER — HYDROXYZINE HYDROCHLORIDE 50 MG/ML
25 INJECTION, SOLUTION INTRAMUSCULAR ONCE
Status: COMPLETED | OUTPATIENT
Start: 2021-04-19 | End: 2021-04-19

## 2021-04-19 RX ORDER — SODIUM CHLORIDE, SODIUM LACTATE, POTASSIUM CHLORIDE, CALCIUM CHLORIDE 600; 310; 30; 20 MG/100ML; MG/100ML; MG/100ML; MG/100ML
INJECTION, SOLUTION INTRAVENOUS CONTINUOUS
Status: DISCONTINUED | OUTPATIENT
Start: 2021-04-19 | End: 2021-04-19 | Stop reason: HOSPADM

## 2021-04-19 RX ORDER — SODIUM CHLORIDE, SODIUM LACTATE, POTASSIUM CHLORIDE, CALCIUM CHLORIDE 600; 310; 30; 20 MG/100ML; MG/100ML; MG/100ML; MG/100ML
500 INJECTION, SOLUTION INTRAVENOUS ONCE
Status: COMPLETED | OUTPATIENT
Start: 2021-04-19 | End: 2021-04-19

## 2021-04-19 RX ADMIN — SODIUM CHLORIDE, POTASSIUM CHLORIDE, SODIUM LACTATE AND CALCIUM CHLORIDE 500 ML: 600; 310; 30; 20 INJECTION, SOLUTION INTRAVENOUS at 03:07

## 2021-04-19 RX ADMIN — SODIUM CHLORIDE, POTASSIUM CHLORIDE, SODIUM LACTATE AND CALCIUM CHLORIDE: 600; 310; 30; 20 INJECTION, SOLUTION INTRAVENOUS at 03:34

## 2021-04-19 RX ADMIN — NIFEDIPINE 20 MG: 10 CAPSULE ORAL at 03:20

## 2021-04-19 RX ADMIN — SODIUM CHLORIDE, POTASSIUM CHLORIDE, SODIUM LACTATE AND CALCIUM CHLORIDE: 600; 310; 30; 20 INJECTION, SOLUTION INTRAVENOUS at 05:41

## 2021-04-19 RX ADMIN — HYDROXYZINE HYDROCHLORIDE 25 MG: 50 INJECTION, SOLUTION INTRAMUSCULAR at 03:20

## 2021-04-19 RX ADMIN — TETANUS TOXOID, REDUCED DIPHTHERIA TOXOID AND ACELLULAR PERTUSSIS VACCINE, ADSORBED 0.5 ML: 5; 2.5; 8; 8; 2.5 SUSPENSION INTRAMUSCULAR at 08:31

## 2021-04-19 RX ADMIN — BETAMETHASONE ACETATE AND BETAMETHASONE SODIUM PHOSPHATE 12 MG: 3; 3 INJECTION, SUSPENSION INTRA-ARTICULAR; INTRALESIONAL; INTRAMUSCULAR; SOFT TISSUE at 03:20

## 2021-04-19 NOTE — FLOWSHEET NOTE
Patient arrives to unit stating she has been having contractions all day but worsened around 2200. Contractions palpate mild to mod. Patient states she hasn't felt the baby move as much since the contractions intensified.

## 2021-04-19 NOTE — LETTER
[address] Delivery  [facility] [address]  Phone: [phone]         April 19, 2044     Patient: Chantelle Franks   YOB: 1990   Date of Visit: 4/19/2021       To Whom It May Concern:    Adela Russell was at the hospital the night of 4/18/21 into the morning of 4/19 with his wife.         Sincerely,                Signature:__________________________________

## 2021-04-19 NOTE — PROGRESS NOTES
Patient presents to L&D today for ROL  IUP at  weeks 36+1    NST is reactive. Quality of tracing is satisfactory.

## 2021-04-19 NOTE — FLOWSHEET NOTE
Spoke with Dr. Olive Carlos on patient arrival. Orders received. Will admit patient for observation.

## 2021-04-20 ENCOUNTER — HOSPITAL ENCOUNTER (OUTPATIENT)
Age: 31
Discharge: HOME | End: 2021-04-20
Attending: OBSTETRICS & GYNECOLOGY | Admitting: OBSTETRICS & GYNECOLOGY
Payer: COMMERCIAL

## 2021-04-20 PROCEDURE — 6360000002 HC RX W HCPCS: Performed by: OBSTETRICS & GYNECOLOGY

## 2021-04-20 PROCEDURE — 96372 THER/PROPH/DIAG INJ SC/IM: CPT

## 2021-04-20 PROCEDURE — G0379 DIRECT REFER HOSPITAL OBSERV: HCPCS

## 2021-04-20 PROCEDURE — G0378 HOSPITAL OBSERVATION PER HR: HCPCS

## 2021-04-20 RX ORDER — BETAMETHASONE SODIUM PHOSPHATE AND BETAMETHASONE ACETATE 3; 3 MG/ML; MG/ML
12 INJECTION, SUSPENSION INTRA-ARTICULAR; INTRALESIONAL; INTRAMUSCULAR; SOFT TISSUE ONCE
Status: COMPLETED | OUTPATIENT
Start: 2021-04-20 | End: 2021-04-20

## 2021-04-20 RX ADMIN — Medication 12 MG: at 11:16

## 2021-04-22 LAB
CULTURE: NORMAL
Lab: NORMAL
SPECIMEN DESCRIPTION: NORMAL

## 2021-04-27 ENCOUNTER — HOSPITAL ENCOUNTER (OUTPATIENT)
Age: 31
Setting detail: OBSERVATION
Discharge: HOME | End: 2021-04-28
Attending: OBSTETRICS & GYNECOLOGY | Admitting: OBSTETRICS & GYNECOLOGY
Payer: COMMERCIAL

## 2021-04-27 PROBLEM — M54.9 BACK PAIN: Status: ACTIVE | Noted: 2021-04-27

## 2021-04-27 LAB
-: ABNORMAL
ABSOLUTE EOS #: 0.07 K/UL (ref 0–0.44)
ABSOLUTE IMMATURE GRANULOCYTE: 0.16 K/UL (ref 0–0.3)
ABSOLUTE LYMPH #: 2.3 K/UL (ref 1.1–3.7)
ABSOLUTE MONO #: 1.17 K/UL (ref 0.1–1.2)
AMORPHOUS: ABNORMAL
BACTERIA: ABNORMAL
BASOPHILS # BLD: 0 % (ref 0–2)
BASOPHILS ABSOLUTE: <0.03 K/UL (ref 0–0.2)
BILIRUBIN URINE: NEGATIVE
CASTS UA: ABNORMAL /LPF
COLOR: YELLOW
COMMENT UA: ABNORMAL
CRYSTALS, UA: ABNORMAL /HPF
DIFFERENTIAL TYPE: ABNORMAL
EOSINOPHILS RELATIVE PERCENT: 1 % (ref 1–4)
EPITHELIAL CELLS UA: ABNORMAL /HPF (ref 0–25)
GLUCOSE URINE: NEGATIVE
HCT VFR BLD CALC: 31.5 % (ref 36.3–47.1)
HEMOGLOBIN: 10 G/DL (ref 11.9–15.1)
IMMATURE GRANULOCYTES: 1 %
KETONES, URINE: NEGATIVE
LEUKOCYTE ESTERASE, URINE: ABNORMAL
LYMPHOCYTES # BLD: 20 % (ref 24–43)
MCH RBC QN AUTO: 27 PG (ref 25.2–33.5)
MCHC RBC AUTO-ENTMCNC: 31.7 G/DL (ref 28.4–34.8)
MCV RBC AUTO: 85.1 FL (ref 82.6–102.9)
MONOCYTES # BLD: 10 % (ref 3–12)
MUCUS: ABNORMAL
NITRITE, URINE: NEGATIVE
NRBC AUTOMATED: 0 PER 100 WBC
OTHER OBSERVATIONS UA: ABNORMAL
PDW BLD-RTO: 14.6 % (ref 11.8–14.4)
PH UA: 7 (ref 5–9)
PLATELET # BLD: 158 K/UL (ref 138–453)
PLATELET ESTIMATE: ABNORMAL
PMV BLD AUTO: 11 FL (ref 8.1–13.5)
PROTEIN UA: NEGATIVE
RBC # BLD: 3.7 M/UL (ref 3.95–5.11)
RBC # BLD: ABNORMAL 10*6/UL
RBC UA: ABNORMAL /HPF (ref 0–2)
RENAL EPITHELIAL, UA: ABNORMAL /HPF
SEG NEUTROPHILS: 68 % (ref 36–65)
SEGMENTED NEUTROPHILS ABSOLUTE COUNT: 8.01 K/UL (ref 1.5–8.1)
SPECIFIC GRAVITY UA: 1.01 (ref 1.01–1.02)
TRICHOMONAS: ABNORMAL
TURBIDITY: CLEAR
URINE HGB: ABNORMAL
UROBILINOGEN, URINE: NORMAL
WBC # BLD: 11.7 K/UL (ref 3.5–11.3)
WBC # BLD: ABNORMAL 10*3/UL
WBC UA: ABNORMAL /HPF (ref 0–5)
YEAST: ABNORMAL

## 2021-04-27 PROCEDURE — 96374 THER/PROPH/DIAG INJ IV PUSH: CPT

## 2021-04-27 PROCEDURE — 6360000002 HC RX W HCPCS: Performed by: OBSTETRICS & GYNECOLOGY

## 2021-04-27 PROCEDURE — G0379 DIRECT REFER HOSPITAL OBSERV: HCPCS

## 2021-04-27 PROCEDURE — 2580000003 HC RX 258: Performed by: OBSTETRICS & GYNECOLOGY

## 2021-04-27 PROCEDURE — 85025 COMPLETE CBC W/AUTO DIFF WBC: CPT

## 2021-04-27 PROCEDURE — 81001 URINALYSIS AUTO W/SCOPE: CPT

## 2021-04-27 PROCEDURE — 96372 THER/PROPH/DIAG INJ SC/IM: CPT

## 2021-04-27 PROCEDURE — G0378 HOSPITAL OBSERVATION PER HR: HCPCS

## 2021-04-27 PROCEDURE — 87086 URINE CULTURE/COLONY COUNT: CPT

## 2021-04-27 RX ORDER — SODIUM CHLORIDE, SODIUM LACTATE, POTASSIUM CHLORIDE, CALCIUM CHLORIDE 600; 310; 30; 20 MG/100ML; MG/100ML; MG/100ML; MG/100ML
INJECTION, SOLUTION INTRAVENOUS CONTINUOUS
Status: DISCONTINUED | OUTPATIENT
Start: 2021-04-27 | End: 2021-04-28 | Stop reason: HOSPADM

## 2021-04-27 RX ORDER — PROMETHAZINE HYDROCHLORIDE 25 MG/ML
25 INJECTION, SOLUTION INTRAMUSCULAR; INTRAVENOUS EVERY 4 HOURS PRN
Status: DISCONTINUED | OUTPATIENT
Start: 2021-04-27 | End: 2021-04-28 | Stop reason: HOSPADM

## 2021-04-27 RX ORDER — NALBUPHINE HCL 10 MG/ML
5 AMPUL (ML) INJECTION EVERY 4 HOURS PRN
Status: DISCONTINUED | OUTPATIENT
Start: 2021-04-27 | End: 2021-04-28 | Stop reason: HOSPADM

## 2021-04-27 RX ADMIN — NALBUPHINE HYDROCHLORIDE 5 MG: 10 INJECTION, SOLUTION INTRAMUSCULAR; INTRAVENOUS; SUBCUTANEOUS at 23:32

## 2021-04-27 RX ADMIN — PROMETHAZINE HYDROCHLORIDE 25 MG: 25 INJECTION, SOLUTION INTRAMUSCULAR; INTRAVENOUS at 23:32

## 2021-04-27 RX ADMIN — SODIUM CHLORIDE, POTASSIUM CHLORIDE, SODIUM LACTATE AND CALCIUM CHLORIDE: 600; 310; 30; 20 INJECTION, SOLUTION INTRAVENOUS at 23:20

## 2021-04-28 ENCOUNTER — APPOINTMENT (OUTPATIENT)
Dept: ULTRASOUND IMAGING | Age: 31
End: 2021-04-28
Payer: COMMERCIAL

## 2021-04-28 VITALS
SYSTOLIC BLOOD PRESSURE: 104 MMHG | HEART RATE: 96 BPM | TEMPERATURE: 97.7 F | DIASTOLIC BLOOD PRESSURE: 63 MMHG | RESPIRATION RATE: 16 BRPM

## 2021-04-28 PROCEDURE — G0378 HOSPITAL OBSERVATION PER HR: HCPCS

## 2021-04-28 PROCEDURE — 96376 TX/PRO/DX INJ SAME DRUG ADON: CPT

## 2021-04-28 PROCEDURE — 76770 US EXAM ABDO BACK WALL COMP: CPT

## 2021-04-28 PROCEDURE — 82365 CALCULUS SPECTROSCOPY: CPT

## 2021-04-28 PROCEDURE — 2580000003 HC RX 258: Performed by: OBSTETRICS & GYNECOLOGY

## 2021-04-28 PROCEDURE — 6360000002 HC RX W HCPCS: Performed by: OBSTETRICS & GYNECOLOGY

## 2021-04-28 RX ADMIN — NALBUPHINE HYDROCHLORIDE 5 MG: 10 INJECTION, SOLUTION INTRAMUSCULAR; INTRAVENOUS; SUBCUTANEOUS at 00:28

## 2021-04-28 RX ADMIN — SODIUM CHLORIDE, POTASSIUM CHLORIDE, SODIUM LACTATE AND CALCIUM CHLORIDE: 600; 310; 30; 20 INJECTION, SOLUTION INTRAVENOUS at 03:08

## 2021-04-28 ASSESSMENT — PAIN SCALES - GENERAL: PAINLEVEL_OUTOF10: 6

## 2021-04-28 NOTE — FLOWSHEET NOTE
Dr. Rossi Pope called et notified of preliminary us report. States that he will be in in an hour to see pt. States pt can have a regular diet.

## 2021-04-28 NOTE — PROGRESS NOTES
Pt calls out stating she think she passed a stone when she was just up to the BR.  Report received from [address] [place] [address],[address]

## 2021-04-28 NOTE — FLOWSHEET NOTE
Pt. Arrived to unit via w/c from ER, complaints of RFlank pain 7/10 starting about one hour ago. States she has a hx. Of kidney stones. Visitor at bedside and writer educated patient on [address].  Pt. Is 37w2d

## 2021-04-28 NOTE — PROGRESS NOTES
Patient off unit in stable condition. Departure Mode: accompanied by staff.     Mobility at Departure: wheelchair    Discharged to: private residence    Time of Discharge: 5075

## 2021-04-28 NOTE — FLOWSHEET NOTE
Pt. Giuliana Colvin upon entering room, easily aroused. Vitals obtained and placed on monitor for intermittent monitoring strip.

## 2021-04-28 NOTE — PROGRESS NOTES
IV dc'd intact. DC instructions reviewed and no questions noted. Pt up and dressed and will wait for ride.

## 2021-04-29 DIAGNOSIS — O24.410 DIET CONTROLLED GESTATIONAL DIABETES MELLITUS (GDM) IN THIRD TRIMESTER: Primary | ICD-10-CM

## 2021-04-29 DIAGNOSIS — O36.8130 DECREASED FETAL MOVEMENTS IN THIRD TRIMESTER, SINGLE OR UNSPECIFIED FETUS: ICD-10-CM

## 2021-04-29 LAB
CULTURE: NORMAL
Lab: NORMAL
SPECIMEN DESCRIPTION: NORMAL

## 2021-05-01 ENCOUNTER — HOSPITAL ENCOUNTER (OUTPATIENT)
Age: 31
Setting detail: OBSERVATION
Discharge: HOME | DRG: 560 | End: 2021-05-01
Attending: OBSTETRICS & GYNECOLOGY | Admitting: OBSTETRICS & GYNECOLOGY
Payer: COMMERCIAL

## 2021-05-01 VITALS
RESPIRATION RATE: 16 BRPM | TEMPERATURE: 97.7 F | HEART RATE: 125 BPM | DIASTOLIC BLOOD PRESSURE: 83 MMHG | SYSTOLIC BLOOD PRESSURE: 125 MMHG

## 2021-05-01 PROCEDURE — G0378 HOSPITAL OBSERVATION PER HR: HCPCS

## 2021-05-01 PROCEDURE — 59025 FETAL NON-STRESS TEST: CPT

## 2021-05-01 PROCEDURE — G0379 DIRECT REFER HOSPITAL OBSERV: HCPCS

## 2021-05-01 NOTE — PROGRESS NOTES
Patient presents to L&D today for decreased fetal movement    IUP at 37+5 weeks     NST is reactive. Quality of tracing is satisfactory.

## 2021-05-01 NOTE — FLOWSHEET NOTE
Dr. Delon Osgood called et notified that pt nst is reactive. No ctx noted on monitor.  States that she can be discharged to home

## 2021-05-03 DIAGNOSIS — O36.8130 DECREASED FETAL MOVEMENTS IN THIRD TRIMESTER, SINGLE OR UNSPECIFIED FETUS: Primary | ICD-10-CM

## 2021-05-03 LAB
STONE COMPOSITION: NORMAL
STONE DESCRIPTION: NORMAL
STONE MASS: 24 MG
STONE NUMBER: 1
STONE SIZE: NORMAL MM

## 2021-05-04 ENCOUNTER — ANESTHESIA EVENT (OUTPATIENT)
Dept: LABOR AND DELIVERY | Age: 31
DRG: 560 | End: 2021-05-04
Payer: COMMERCIAL

## 2021-05-04 ENCOUNTER — APPOINTMENT (OUTPATIENT)
Dept: ULTRASOUND IMAGING | Age: 31
DRG: 560 | End: 2021-05-04
Payer: COMMERCIAL

## 2021-05-04 ENCOUNTER — ANESTHESIA (OUTPATIENT)
Dept: LABOR AND DELIVERY | Age: 31
DRG: 560 | End: 2021-05-04
Payer: COMMERCIAL

## 2021-05-04 ENCOUNTER — HOSPITAL ENCOUNTER (INPATIENT)
Age: 31
LOS: 2 days | Discharge: HOME | DRG: 560 | End: 2021-05-06
Attending: OBSTETRICS & GYNECOLOGY | Admitting: OBSTETRICS & GYNECOLOGY
Payer: COMMERCIAL

## 2021-05-04 DIAGNOSIS — O36.8130 DECREASED FETAL MOVEMENTS IN THIRD TRIMESTER, SINGLE OR UNSPECIFIED FETUS: ICD-10-CM

## 2021-05-04 DIAGNOSIS — M54.41 LOW BACK PAIN WITH RIGHT-SIDED SCIATICA, UNSPECIFIED BACK PAIN LATERALITY, UNSPECIFIED CHRONICITY: Primary | ICD-10-CM

## 2021-05-04 LAB
ABSOLUTE EOS #: 0.06 K/UL (ref 0–0.44)
ABSOLUTE IMMATURE GRANULOCYTE: 0.16 K/UL (ref 0–0.3)
ABSOLUTE LYMPH #: 1.9 K/UL (ref 1.1–3.7)
ABSOLUTE MONO #: 0.93 K/UL (ref 0.1–1.2)
AMPHETAMINE SCREEN URINE: NEGATIVE
BARBITURATE SCREEN URINE: NEGATIVE
BASOPHILS # BLD: 0 % (ref 0–2)
BASOPHILS ABSOLUTE: 0.03 K/UL (ref 0–0.2)
BENZODIAZEPINE SCREEN, URINE: NEGATIVE
BUPRENORPHINE URINE: NEGATIVE
CANNABINOID SCREEN URINE: NEGATIVE
COCAINE METABOLITE, URINE: NEGATIVE
DIFFERENTIAL TYPE: ABNORMAL
EOSINOPHILS RELATIVE PERCENT: 1 % (ref 1–4)
HCT VFR BLD CALC: 35.6 % (ref 36.3–47.1)
HEMOGLOBIN: 11.3 G/DL (ref 11.9–15.1)
IMMATURE GRANULOCYTES: 2 %
LYMPHOCYTES # BLD: 17 % (ref 24–43)
MCH RBC QN AUTO: 27.2 PG (ref 25.2–33.5)
MCHC RBC AUTO-ENTMCNC: 31.7 G/DL (ref 28.4–34.8)
MCV RBC AUTO: 85.6 FL (ref 82.6–102.9)
MDMA URINE: NORMAL
METHADONE SCREEN, URINE: NEGATIVE
METHAMPHETAMINE, URINE: NEGATIVE
MONOCYTES # BLD: 9 % (ref 3–12)
NRBC AUTOMATED: 0 PER 100 WBC
OPIATES, URINE: NEGATIVE
OXYCODONE SCREEN URINE: NEGATIVE
PDW BLD-RTO: 15.6 % (ref 11.8–14.4)
PHENCYCLIDINE, URINE: NEGATIVE
PLATELET # BLD: 155 K/UL (ref 138–453)
PLATELET ESTIMATE: ABNORMAL
PMV BLD AUTO: 11.8 FL (ref 8.1–13.5)
PROPOXYPHENE, URINE: NEGATIVE
RBC # BLD: 4.16 M/UL (ref 3.95–5.11)
RBC # BLD: ABNORMAL 10*6/UL
SEG NEUTROPHILS: 71 % (ref 36–65)
SEGMENTED NEUTROPHILS ABSOLUTE COUNT: 7.88 K/UL (ref 1.5–8.1)
TEST INFORMATION: NORMAL
TRICYCLIC ANTIDEPRESSANTS, UR: NEGATIVE
WBC # BLD: 11 K/UL (ref 3.5–11.3)
WBC # BLD: ABNORMAL 10*3/UL

## 2021-05-04 PROCEDURE — 76818 FETAL BIOPHYS PROFILE W/NST: CPT

## 2021-05-04 PROCEDURE — 1220000000 HC SEMI PRIVATE OB R&B

## 2021-05-04 PROCEDURE — 36415 COLL VENOUS BLD VENIPUNCTURE: CPT

## 2021-05-04 PROCEDURE — 80306 DRUG TEST PRSMV INSTRMNT: CPT

## 2021-05-04 PROCEDURE — 85025 COMPLETE CBC W/AUTO DIFF WBC: CPT

## 2021-05-04 PROCEDURE — 2580000003 HC RX 258: Performed by: OBSTETRICS & GYNECOLOGY

## 2021-05-04 PROCEDURE — 59025 FETAL NON-STRESS TEST: CPT

## 2021-05-04 PROCEDURE — 6360000002 HC RX W HCPCS: Performed by: OBSTETRICS & GYNECOLOGY

## 2021-05-04 PROCEDURE — 10907ZC DRAINAGE OF AMNIOTIC FLUID, THERAPEUTIC FROM PRODUCTS OF CONCEPTION, VIA NATURAL OR ARTIFICIAL OPENING: ICD-10-PCS | Performed by: OBSTETRICS & GYNECOLOGY

## 2021-05-04 PROCEDURE — 3E033VJ INTRODUCTION OF OTHER HORMONE INTO PERIPHERAL VEIN, PERCUTANEOUS APPROACH: ICD-10-PCS | Performed by: OBSTETRICS & GYNECOLOGY

## 2021-05-04 PROCEDURE — 6370000000 HC RX 637 (ALT 250 FOR IP): Performed by: OBSTETRICS & GYNECOLOGY

## 2021-05-04 PROCEDURE — 2500000003 HC RX 250 WO HCPCS: Performed by: NURSE ANESTHETIST, CERTIFIED REGISTERED

## 2021-05-04 RX ORDER — SODIUM CHLORIDE 0.9 % (FLUSH) 0.9 %
10 SYRINGE (ML) INJECTION PRN
Status: DISCONTINUED | OUTPATIENT
Start: 2021-05-04 | End: 2021-05-05

## 2021-05-04 RX ORDER — METHYLERGONOVINE MALEATE 0.2 MG/ML
200 INJECTION INTRAVENOUS PRN
Status: DISCONTINUED | OUTPATIENT
Start: 2021-05-04 | End: 2021-05-05

## 2021-05-04 RX ORDER — SODIUM CHLORIDE, SODIUM LACTATE, POTASSIUM CHLORIDE, CALCIUM CHLORIDE 600; 310; 30; 20 MG/100ML; MG/100ML; MG/100ML; MG/100ML
INJECTION, SOLUTION INTRAVENOUS PRN
Status: DISCONTINUED | OUTPATIENT
Start: 2021-05-04 | End: 2021-05-05

## 2021-05-04 RX ORDER — SODIUM CHLORIDE 9 MG/ML
25 INJECTION, SOLUTION INTRAVENOUS PRN
Status: DISCONTINUED | OUTPATIENT
Start: 2021-05-04 | End: 2021-05-05

## 2021-05-04 RX ORDER — NALBUPHINE HCL 10 MG/ML
5 AMPUL (ML) INJECTION ONCE
Status: DISCONTINUED | OUTPATIENT
Start: 2021-05-04 | End: 2021-05-05

## 2021-05-04 RX ORDER — PROMETHAZINE HYDROCHLORIDE 25 MG/ML
25 INJECTION, SOLUTION INTRAMUSCULAR; INTRAVENOUS ONCE
Status: COMPLETED | OUTPATIENT
Start: 2021-05-05 | End: 2021-05-04

## 2021-05-04 RX ORDER — LIDOCAINE HYDROCHLORIDE 10 MG/ML
30 INJECTION, SOLUTION EPIDURAL; INFILTRATION; INTRACAUDAL; PERINEURAL PRN
Status: DISCONTINUED | OUTPATIENT
Start: 2021-05-04 | End: 2021-05-05

## 2021-05-04 RX ORDER — SODIUM CHLORIDE 0.9 % (FLUSH) 0.9 %
10 SYRINGE (ML) INJECTION EVERY 12 HOURS SCHEDULED
Status: DISCONTINUED | OUTPATIENT
Start: 2021-05-04 | End: 2021-05-05

## 2021-05-04 RX ORDER — NALBUPHINE HCL 10 MG/ML
10 AMPUL (ML) INJECTION
Status: DISCONTINUED | OUTPATIENT
Start: 2021-05-04 | End: 2021-05-05

## 2021-05-04 RX ORDER — ONDANSETRON 2 MG/ML
4 INJECTION INTRAMUSCULAR; INTRAVENOUS EVERY 6 HOURS PRN
Status: DISCONTINUED | OUTPATIENT
Start: 2021-05-04 | End: 2021-05-05

## 2021-05-04 RX ORDER — CARBOPROST TROMETHAMINE 250 UG/ML
250 INJECTION, SOLUTION INTRAMUSCULAR PRN
Status: DISCONTINUED | OUTPATIENT
Start: 2021-05-04 | End: 2021-05-05

## 2021-05-04 RX ORDER — FAMOTIDINE 20 MG/1
20 TABLET, FILM COATED ORAL ONCE
Status: COMPLETED | OUTPATIENT
Start: 2021-05-04 | End: 2021-05-04

## 2021-05-04 RX ORDER — SEVOFLURANE 250 ML/250ML
1 LIQUID RESPIRATORY (INHALATION) CONTINUOUS PRN
Status: DISCONTINUED | OUTPATIENT
Start: 2021-05-04 | End: 2021-05-05

## 2021-05-04 RX ORDER — NALBUPHINE HCL 10 MG/ML
5 AMPUL (ML) INJECTION ONCE
Status: COMPLETED | OUTPATIENT
Start: 2021-05-04 | End: 2021-05-04

## 2021-05-04 RX ORDER — MISOPROSTOL 100 UG/1
900 TABLET ORAL PRN
Status: DISCONTINUED | OUTPATIENT
Start: 2021-05-04 | End: 2021-05-05

## 2021-05-04 RX ORDER — ACETAMINOPHEN 325 MG/1
650 TABLET ORAL EVERY 4 HOURS PRN
Status: DISCONTINUED | OUTPATIENT
Start: 2021-05-04 | End: 2021-05-05

## 2021-05-04 RX ADMIN — NALBUPHINE HYDROCHLORIDE 5 MG: 10 INJECTION, SOLUTION INTRAMUSCULAR; INTRAVENOUS; SUBCUTANEOUS at 19:47

## 2021-05-04 RX ADMIN — PROMETHAZINE HYDROCHLORIDE 25 MG: 25 INJECTION, SOLUTION INTRAMUSCULAR; INTRAVENOUS at 23:38

## 2021-05-04 RX ADMIN — OXYTOCIN 1 MILLI-UNITS/MIN: 10 INJECTION INTRAVENOUS at 21:49

## 2021-05-04 RX ADMIN — SODIUM CHLORIDE, POTASSIUM CHLORIDE, SODIUM LACTATE AND CALCIUM CHLORIDE: 600; 310; 30; 20 INJECTION, SOLUTION INTRAVENOUS at 19:52

## 2021-05-04 RX ADMIN — LIDOCAINE HYDROCHLORIDE AND EPINEPHRINE 2 ML: 15; 5 INJECTION, SOLUTION EPIDURAL at 23:58

## 2021-05-04 RX ADMIN — FAMOTIDINE 20 MG: 20 TABLET, FILM COATED ORAL at 15:20

## 2021-05-04 RX ADMIN — NALBUPHINE HYDROCHLORIDE 5 MG: 10 INJECTION, SOLUTION INTRAMUSCULAR; INTRAVENOUS; SUBCUTANEOUS at 20:06

## 2021-05-04 RX ADMIN — SODIUM CHLORIDE, POTASSIUM CHLORIDE, SODIUM LACTATE AND CALCIUM CHLORIDE: 600; 310; 30; 20 INJECTION, SOLUTION INTRAVENOUS at 23:19

## 2021-05-04 RX ADMIN — LIDOCAINE HYDROCHLORIDE AND EPINEPHRINE 3 ML: 15; 5 INJECTION, SOLUTION EPIDURAL at 23:56

## 2021-05-04 ASSESSMENT — PAIN SCALES - GENERAL: PAINLEVEL_OUTOF10: 2

## 2021-05-04 NOTE — PLAN OF CARE
supplementation to prevent nonreassu . ..   Goal: Absence of abnormal fetal heart rate pattern  Description: Absence of abnormal fetal heart rate pattern  Outcome: Ongoing     Problem: Urinary Retention:  Goal: Experiences of bladder distention will decrease  Description: Experiences of bladder distention will decrease  Outcome: Ongoing  Goal: Urinary elimination within specified parameters  Description: Urinary elimination within specified parameters  Outcome: Ongoing

## 2021-05-04 NOTE — FLOWSHEET NOTE
Dr. Birmingham Prost to bedside with AROM performed per physician. SVE 3, 50, -2.  5 mg given prior to procedure. Once AROM complete, another 5 mg Nubain ordered and given IV. Pain went from 0-5 prior to and after.

## 2021-05-04 NOTE — H&P
Department of Obstetrics and Gynecology   Obstetrics History and Physical        CHIEF COMPLAINT:  Induction of labor. HISTORY OF PRESENT ILLNESS:      The patient is a 31 y.o. female at 36w4d. OB History        5    Para   4    Term   4            AB        Living   4       SAB        TAB        Ectopic        Molar        Multiple   0    Live Births   4            Patient presents for an NST /BPP. BPP/NST 6/10.     Estimated Due Date: Estimated Date of Delivery: 21    PRENATAL CARE:    Complicated by: none    PAST OB HISTORY:  OB History        5    Para   4    Term   4            AB        Living   4       SAB        TAB        Ectopic        Molar        Multiple   0    Live Births   4                Past Medical History:        Diagnosis Date    Anemia     GERD (gastroesophageal reflux disease)     Kidney stones     Postpartum depression      Past Surgical History:        Procedure Laterality Date    EXTERNAL EAR SURGERY Left     LITHOTRIPSY Right 2020    ESWL EXTRACORPOREAL SHOCK WAVE LITHOTRIPSY     LITHOTRIPSY Right 2020    ESWL EXTRACORPOREAL SHOCK WAVE LITHOTRIPSY performed by Nayely Harley MD at [place] [address] Right 2016    right cysto HLL with stent exchange    UPPER GASTROINTESTINAL ENDOSCOPY      VAGINA SURGERY      LABIA HEMATOMA     Allergies:  Reglan [metoclopramide] and Norco [hydrocodone-acetaminophen]    Social History:    Social History     Socioeconomic History    Marital status:      Spouse name: Not on file    Number of children: Not on file    Years of education: Not on file    Highest education level: Not on file   Occupational History    Not on file   Social Needs    Financial resource strain: Not on file    Food insecurity     Worry: Not on file     Inability: Not on file    Transportation needs     Medical: Not on file     Non-medical: Not on file   Tobacco Use    Smoking status: Never Smoker    Smokeless tobacco: Never Used   Substance and Sexual Activity    Alcohol use: Not Currently     Comment: SOCIAL    Drug use: No    Sexual activity: Yes     Partners: Male   Lifestyle    Physical activity     Days per week: Not on file     Minutes per session: Not on file    Stress: Not on file   Relationships    Social connections     Talks on phone: Not on file     Gets together: Not on file     Attends Latter day service: Not on file     Active member of club or organization: Not on file     Attends meetings of clubs or organizations: Not on file     Relationship status: Not on file    Intimate partner violence     Fear of current or ex partner: Not on file     Emotionally abused: Not on file     Physically abused: Not on file     Forced sexual activity: Not on file   Other Topics Concern    Not on file   Social History Narrative    Not on file     Family History:       Problem Relation Age of Onset    Ulcerative Colitis Father     Ulcerative Colitis Sister     Asthma Sister     Heart Disease Maternal Grandfather     Stroke Maternal Grandfather     High Blood Pressure Maternal Grandfather     Arthritis Maternal Grandmother     Vision Loss Maternal Grandmother      Medications Prior to Admission:  Medications Prior to Admission: ferrous sulfate (IRON 325) 325 (65 Fe) MG tablet, Take 325 mg by mouth daily (with breakfast)  omeprazole (PRILOSEC) 10 MG delayed release capsule, Take 10 mg by mouth daily  Prenatal Vit-Fe Fum-FA-Omega (PRENATAL FORMULA PO), Take by mouth  promethazine (PHENERGAN) 12.5 MG tablet, Take 1 tablet by mouth every 6 hours as needed for Nausea  acetaminophen (TYLENOL) 500 MG tablet, Take 1,000 mg by mouth every 6 hours as needed for Pain    REVIEW OF SYSTEMS:    CONSTITUTIONAL:  negative  RESPIRATORY:  negative  CARDIOVASCULAR:  negative  GASTROINTESTINAL:  negative  ALLERGIC/IMMUNOLOGIC:  negative  NEUROLOGICAL:  negative  BEHAVIOR/PSYCH:  negative    PHYSICAL EXAM:  Vitals:    05/04/21 1337 05/04/21 1408 05/04/21 1436 05/04/21 1548   BP: 137/86 129/89 130/85 129/82   Pulse: 100 96 100 98   Resp: 16 16 16 18   Temp:    98 °F (36.7 °C)   TempSrc:    Oral     General appearance:  awake, alert, cooperative, no apparent distress, and appears stated age  HEENT: ABHISHEK EOMI, trachea is midline, no thyroidmegaly  Heart: Regular rate and rhythm without murmur  Neurologic:  Awake, alert, oriented to name, place and time. Cranial nerves II-X11 grossly intact. Patellar DTR's 2/4 bilaterally. No clonus  Lungs:  No increased work of breathing, good air exchange  Abdomen:  Soft, non tender, gravid, consistent with her gestational age. Fundal height 38 CM. Fetal heart rate:  Reassuring, Cat 1, appropriate for gestational age  Pelvis:  Adequate pelvis  Cervix: 2 cm 25% medium -3  Contraction frequency:  0 minutes    Membranes:  Intact    Labs:   CBC:   Lab Results   Component Value Date    WBC 11.0 05/04/2021    RBC 4.16 05/04/2021    RBC 4.64 08/28/2011    HGB 11.3 05/04/2021    HCT 35.6 05/04/2021    MCV 85.6 05/04/2021    MCH 27.2 05/04/2021    MCHC 31.7 05/04/2021    RDW 15.6 05/04/2021     05/04/2021     08/28/2011    MPV 11.8 05/04/2021       NST is CAT-1.    ASSESSMENT: Term Intrauterine Pregnancy in for Induction of Labor due to low score BPP at term. PLAN: Admit, induction of labor protocol.        GBS: Negative

## 2021-05-04 NOTE — PROGRESS NOTES
Patient presents to L&D today for decreased fetal movement    IUP at 38.2 weeks     NST is reactive. Quality of tracing is satisfactory.

## 2021-05-04 NOTE — PROGRESS NOTES
Dr. Kwabena Joseph notified of pt's BPP results and ANTHONY. RN ordered to place pt on monitor and he will be in shortly.

## 2021-05-05 PROCEDURE — 7200000001 HC VAGINAL DELIVERY

## 2021-05-05 PROCEDURE — 2580000003 HC RX 258: Performed by: OBSTETRICS & GYNECOLOGY

## 2021-05-05 PROCEDURE — 3700000025 EPIDURAL BLOCK: Performed by: NURSE ANESTHETIST, CERTIFIED REGISTERED

## 2021-05-05 PROCEDURE — 6360000002 HC RX W HCPCS: Performed by: NURSE ANESTHETIST, CERTIFIED REGISTERED

## 2021-05-05 PROCEDURE — 6360000002 HC RX W HCPCS: Performed by: OBSTETRICS & GYNECOLOGY

## 2021-05-05 PROCEDURE — 6370000000 HC RX 637 (ALT 250 FOR IP)

## 2021-05-05 PROCEDURE — 6370000000 HC RX 637 (ALT 250 FOR IP): Performed by: OBSTETRICS & GYNECOLOGY

## 2021-05-05 PROCEDURE — 1220000000 HC SEMI PRIVATE OB R&B

## 2021-05-05 PROCEDURE — 51702 INSERT TEMP BLADDER CATH: CPT

## 2021-05-05 PROCEDURE — 88307 TISSUE EXAM BY PATHOLOGIST: CPT

## 2021-05-05 RX ORDER — ONDANSETRON 2 MG/ML
4 INJECTION INTRAMUSCULAR; INTRAVENOUS EVERY 6 HOURS PRN
Status: DISCONTINUED | OUTPATIENT
Start: 2021-05-05 | End: 2021-05-06 | Stop reason: HOSPADM

## 2021-05-05 RX ORDER — LIDOCAINE HYDROCHLORIDE AND EPINEPHRINE 15; 5 MG/ML; UG/ML
INJECTION, SOLUTION EPIDURAL PRN
Status: DISCONTINUED | OUTPATIENT
Start: 2021-05-04 | End: 2021-05-05 | Stop reason: SDUPTHER

## 2021-05-05 RX ORDER — ACETAMINOPHEN 500 MG
TABLET ORAL
Status: COMPLETED
Start: 2021-05-05 | End: 2021-05-05

## 2021-05-05 RX ORDER — ACETAMINOPHEN 500 MG
1000 TABLET ORAL EVERY 4 HOURS PRN
Status: DISCONTINUED | OUTPATIENT
Start: 2021-05-05 | End: 2021-05-06 | Stop reason: HOSPADM

## 2021-05-05 RX ORDER — NALOXONE HYDROCHLORIDE 0.4 MG/ML
0.4 INJECTION, SOLUTION INTRAMUSCULAR; INTRAVENOUS; SUBCUTANEOUS PRN
Status: DISCONTINUED | OUTPATIENT
Start: 2021-05-05 | End: 2021-05-05

## 2021-05-05 RX ORDER — ROPIVACAINE HYDROCHLORIDE 2 MG/ML
12 INJECTION, SOLUTION EPIDURAL; INFILTRATION; PERINEURAL CONTINUOUS
Status: DISCONTINUED | OUTPATIENT
Start: 2021-05-05 | End: 2021-05-05

## 2021-05-05 RX ORDER — METHYLERGONOVINE MALEATE 0.2 MG/ML
200 INJECTION INTRAVENOUS PRN
Status: DISCONTINUED | OUTPATIENT
Start: 2021-05-05 | End: 2021-05-06 | Stop reason: HOSPADM

## 2021-05-05 RX ORDER — DOCUSATE SODIUM 100 MG/1
100 CAPSULE, LIQUID FILLED ORAL 2 TIMES DAILY
Status: DISCONTINUED | OUTPATIENT
Start: 2021-05-05 | End: 2021-05-06 | Stop reason: HOSPADM

## 2021-05-05 RX ORDER — SODIUM CHLORIDE, SODIUM LACTATE, POTASSIUM CHLORIDE, CALCIUM CHLORIDE 600; 310; 30; 20 MG/100ML; MG/100ML; MG/100ML; MG/100ML
INJECTION, SOLUTION INTRAVENOUS CONTINUOUS
Status: DISCONTINUED | OUTPATIENT
Start: 2021-05-05 | End: 2021-05-06 | Stop reason: HOSPADM

## 2021-05-05 RX ORDER — IBUPROFEN 800 MG/1
800 TABLET ORAL EVERY 8 HOURS SCHEDULED
Status: DISCONTINUED | OUTPATIENT
Start: 2021-05-05 | End: 2021-05-06 | Stop reason: HOSPADM

## 2021-05-05 RX ORDER — ROPIVACAINE HYDROCHLORIDE 2 MG/ML
INJECTION, SOLUTION EPIDURAL; INFILTRATION; PERINEURAL CONTINUOUS PRN
Status: DISCONTINUED | OUTPATIENT
Start: 2021-05-05 | End: 2021-05-05 | Stop reason: SDUPTHER

## 2021-05-05 RX ORDER — EPHEDRINE SULFATE/0.9% NACL/PF 50 MG/5 ML
5 SYRINGE (ML) INTRAVENOUS EVERY 5 MIN PRN
Status: DISCONTINUED | OUTPATIENT
Start: 2021-05-05 | End: 2021-05-05

## 2021-05-05 RX ADMIN — SODIUM CHLORIDE, POTASSIUM CHLORIDE, SODIUM LACTATE AND CALCIUM CHLORIDE 150 ML/HR: 600; 310; 30; 20 INJECTION, SOLUTION INTRAVENOUS at 05:05

## 2021-05-05 RX ADMIN — ROPIVACAINE HYDROCHLORIDE 12 ML/HR: 2 INJECTION, SOLUTION EPIDURAL; INFILTRATION at 00:10

## 2021-05-05 RX ADMIN — IBUPROFEN 800 MG: 800 TABLET, FILM COATED ORAL at 12:02

## 2021-05-05 RX ADMIN — OXYTOCIN 2 MILLI-UNITS/MIN: 10 INJECTION INTRAVENOUS at 02:46

## 2021-05-05 RX ADMIN — Medication 999 ML/HR: at 05:59

## 2021-05-05 RX ADMIN — DOCUSATE SODIUM 100 MG: 100 CAPSULE, LIQUID FILLED ORAL at 19:45

## 2021-05-05 RX ADMIN — IBUPROFEN 800 MG: 800 TABLET, FILM COATED ORAL at 19:45

## 2021-05-05 RX ADMIN — ACETAMINOPHEN 1000 MG: 500 TABLET, FILM COATED ORAL at 15:58

## 2021-05-05 RX ADMIN — ACETAMINOPHEN 650 MG: 325 TABLET ORAL at 06:29

## 2021-05-05 ASSESSMENT — PAIN SCALES - GENERAL
PAINLEVEL_OUTOF10: 5
PAINLEVEL_OUTOF10: 5
PAINLEVEL_OUTOF10: 4

## 2021-05-05 NOTE — FLOWSHEET NOTE
updated via phone. Notified of prolonged decel as well as interventions. Pitocin is off, SVE 6, 80, -2. Angel in place. Aware that decel occurred with patient on her right side with peanut ball and patient is now on left side with peanut ball. Fetal variability is minimal since decel. Physician aware of the above as well as IVF bolus that continues now. Order to restart pitocin at [facility] in one hour\". Verified that rate would be 2 mu. No further orders at this time.

## 2021-05-05 NOTE — FLOWSHEET NOTE
Pt assisted to restroom with 1 person right leg is better but slightly tingly.  Pt voids without difficulty and pericare given pt assisted back to bed and pt instructed to call for assistance to get up next time

## 2021-05-05 NOTE — FLOWSHEET NOTE
Dr. Kyle Mcdermott updated via phone that patient is complete and 0 station. He stated he would be on his way.

## 2021-05-05 NOTE — ANESTHESIA PRE PROCEDURE
Department of Anesthesiology  Preprocedure Note       Name:  Jose Miguel Fernando   Age:  31 y.o.  :  1990                                          MRN:  [id number]         Date:  2021      Surgeon: * No surgeons listed *    Procedure: * No procedures listed *    Medications prior to admission:   Prior to Admission medications    Medication Sig Start Date End Date Taking?  Authorizing Provider   ferrous sulfate (IRON 325) 325 (65 Fe) MG tablet Take 325 mg by mouth daily (with breakfast)    Historical Provider, MD   omeprazole (PRILOSEC) 10 MG delayed release capsule Take 10 mg by mouth daily    Historical Provider, MD   Prenatal Vit-Fe Fum-FA-Omega (PRENATAL FORMULA PO) Take by mouth    Historical Provider, MD   promethazine (PHENERGAN) 12.5 MG tablet Take 1 tablet by mouth every 6 hours as needed for Nausea 20   FÁTIMA Farooq CNP   acetaminophen (TYLENOL) 500 MG tablet Take 1,000 mg by mouth every 6 hours as needed for Pain    Historical Provider, MD       Current medications:    Current Facility-Administered Medications   Medication Dose Route Frequency Provider Last Rate Last Admin    lactated ringers infusion   Intravenous PRN Rosalee Nation  mL/hr at 21 New Bag at 21    sodium chloride flush 0.9 % injection 10 mL  10 mL Intravenous 2 times per day Rosalee Nation MD        sodium chloride flush 0.9 % injection 10 mL  10 mL Intravenous PRN Rosalee Nation MD        0.9 % sodium chloride infusion  25 mL Intravenous PRN Rosalee Nation MD        lidocaine PF 1 % injection 30 mL  30 mL Other PRN Rosalee Nation MD        nalbuphine (NUBAIN) injection 10 mg  10 mg Intravenous Q2H PRN Rosalee Nation MD        nitrous oxide 50% inhalation 1 each  1 each Inhalation Continuous PRN Rosalee Nation MD        ondansetron (ZOFRAN) injection 4 mg  4 mg Intravenous Q6H PRN Rosalee Nation MD        oxytocin (PITOCIN) 10 unit bolus from the bag  999 mL/hr Intravenous PRN Tavia Servin MD        And    oxytocin (PITOCIN) 30 Units in sodium chloride 0.9 % 500 mL infusion  166 stephenie-units/min Intravenous PRN Tavia Servin MD        methylergonovine (METHERGINE) injection 200 mcg  200 mcg Intramuscular PRN Tavia Servin MD        carboprost (HEMABATE) injection 250 mcg  250 mcg Intramuscular PRN Tavai Servin MD        miSOPROStol (CYTOTEC) tablet 900 mcg  900 mcg Rectal PRN Tavia Servin MD        acetaminophen (TYLENOL) tablet 650 mg  650 mg Oral Q4H PRN MD Scott De Santiago witch hazel-glycerin (TUCKS) pad   Topical PRN Tavia Servin MD        benzocaine-menthol (DERMOPLAST) 20-0.5 % spray   Topical PRN Tavia Servin MD           Allergies:     Allergies   Allergen Reactions    Reglan [Metoclopramide]      Causes super anxiousness    Norco [Hydrocodone-Acetaminophen] Anxiety       Problem List:    Patient Active Problem List   Diagnosis Code    GERD (gastroesophageal reflux disease) K21.9    Renal lithiasis N20.0    Ureteral calculus N20.1     contractions O47.9    Term pregnancy Z34.90    Labial abscess N76.4    Uterine contractions during pregnancy O62.2    Back pain M54.9       Past Medical History:        Diagnosis Date    Anemia     GERD (gastroesophageal reflux disease)     Kidney stones     Postpartum depression        Past Surgical History:        Procedure Laterality Date    EXTERNAL EAR SURGERY Left     LITHOTRIPSY Right 2020    ESWL EXTRACORPOREAL SHOCK WAVE LITHOTRIPSY     LITHOTRIPSY Right 2020    ESWL EXTRACORPOREAL SHOCK WAVE LITHOTRIPSY performed by Hannah Navarro MD at [address] [place] [address],[place] [address] Right 2016    right cysto HLL with stent exchange    UPPER GASTROINTESTINAL ENDOSCOPY      VAGINA SURGERY      LABIA HEMATOMA       Social History:    Social History     Tobacco Use    Smoking status: Never Smoker    Smokeless tobacco: Never Used   Substance Use Topics    Alcohol use: Not Currently Comment: SOCIAL                                Counseling given: Not Answered      Vital Signs (Current):   Vitals:    05/04/21 1436 05/04/21 1548 05/04/21 1919 05/04/21 2043   BP: 130/85 129/82 124/76 139/82   Pulse: 100 98 94 95   Resp: 16 18 16 16   Temp:  36.7 °C (98 °F) 36.8 °C (98.3 °F)    TempSrc:  Oral Oral                                               BP Readings from Last 3 Encounters:   05/04/21 139/82   05/01/21 125/83   04/28/21 104/63       NPO Status: Time of last liquid consumption: 0950                        Time of last solid consumption: 0950                        Date of last liquid consumption: 05/04/21                        Date of last solid food consumption: 05/04/21    BMI:   Wt Readings from Last 3 Encounters:   04/27/21 167 lb (75.8 kg)   04/19/21 162 lb (73.5 kg)   02/23/21 155 lb (70.3 kg)     There is no height or weight on file to calculate BMI.    CBC:   Lab Results   Component Value Date    WBC 11.0 05/04/2021    RBC 4.16 05/04/2021    RBC 4.64 08/28/2011    HGB 11.3 05/04/2021    HCT 35.6 05/04/2021    MCV 85.6 05/04/2021    RDW 15.6 05/04/2021     05/04/2021     08/28/2011       CMP:   Lab Results   Component Value Date     01/03/2021    K 3.9 01/03/2021     01/03/2021    CO2 24 01/03/2021    BUN 8 01/03/2021    CREATININE 0.54 01/03/2021    GFRAA >60 01/03/2021    LABGLOM >60 01/03/2021    GLUCOSE 185 03/16/2021    GLUCOSE 87 01/03/2021    GLUCOSE 81 08/28/2011    PROT 6.5 01/03/2021    CALCIUM 8.8 01/03/2021    BILITOT 0.29 01/03/2021    ALKPHOS 68 01/03/2021    AST 13 01/03/2021    ALT 5 01/03/2021       POC Tests: No results for input(s): POCGLU, POCNA, POCK, POCCL, POCBUN, POCHEMO, POCHCT in the last 72 hours.     Coags: No results found for: PROTIME, INR, APTT    HCG (If Applicable):   Lab Results   Component Value Date    PREGTESTUR NEGATIVE 08/25/2020    HCGQUANT 25,664 (H) 11/07/2018        ABGs: No results found for: PHART, PO2ART, XST0HIP, IBZ2SJP, BEART, Z8YAWKKV     Type & Screen (If Applicable):  No results found for: LABABO, LABRH    Drug/Infectious Status (If Applicable):  Lab Results   Component Value Date    HEPCAB NONREACTIVE 10/06/2020       COVID-19 Screening (If Applicable):   Lab Results   Component Value Date    COVID19 DETECTED 02/23/2021    COVID19 Not Detected 08/18/2020           Anesthesia Evaluation  Patient summary reviewed and Nursing notes reviewed  Airway: Mallampati: I  TM distance: >3 FB   Neck ROM: full  Mouth opening: > = 3 FB Dental: normal exam         Pulmonary:Negative Pulmonary ROS and normal exam                               Cardiovascular:Negative CV ROS                      Neuro/Psych:   Negative Neuro/Psych ROS     (-) psychiatric history           GI/Hepatic/Renal:   (+) GERD: no interval change,           Endo/Other: Negative Endo/Other ROS                    Abdominal:         Obesity: G5, P4, induction for failed BPP. Vascular: negative vascular ROS. Anesthesia Plan      epidural     ASA 2             Anesthetic plan and risks discussed with patient.                       Ashley Kaufman APRN - CRNA   5/4/2021

## 2021-05-05 NOTE — FLOWSHEET NOTE
Just spoke with Dr. Cruzito Bingham over the phone with update with [place] just now.  5cm, 80, -2 per this nurse exam. Pitocin rate at 5 with contractions every 2-4. Patient is comfortable with no pain at this time. Variability is moderate now and accels noted. Will continue to update PRN for delivery. No new orders a this time.

## 2021-05-05 NOTE — ANESTHESIA PROCEDURE NOTES
Epidural Block    Patient location during procedure: OB  Start time: 5/4/2021 11:50 PM  End time: 5/5/2021 12:12 AM  Reason for block: labor epidural  Staffing  Resident/CRNA: FÁTIMA Obrien - CRNA  Preanesthetic Checklist  Completed: patient identified, IV checked, site marked, risks and benefits discussed, surgical consent, monitors and equipment checked, pre-op evaluation, timeout performed, anesthesia consent given, oxygen available and patient being monitored  Epidural  Patient position: sitting  Prep: ChloraPrep and site prepped and draped  Patient monitoring: continuous pulse ox and frequent blood pressure checks  Approach: midline  Location: lumbar (1-5)  Injection technique: BENITO air  Provider prep: mask and sterile gloves  Needle  Needle type: Tuohy   Needle gauge: 17 G  Needle length: 3.5 in  Needle insertion depth: 7 cm  Catheter type: side hole  Catheter size: 19 G  Catheter at skin depth: 12 cm  Test dose: negative (5 ml lidocaine 1.5%+epi 1:200K)  Kit: Gratafy Perifix FX  Lot number: [id number]  Expiration date: 11/1/2021  Assessment  Sensory level: T8  Hemodynamics: stable  Attempts: 1  Additional Notes  2350 patient sitting, timeout, skin prep and kit prep  2353 drape and 3ml lidocaine 1% SQ infiltration at L3-4  2354 epidural space achieved easily and catheter threaded without difficulty. Patient c/o pain in right side of back/trunk during entire duration of needle placement. No parsthesia to hip or L.E. during needle placement and no c/o pain or paresthesia during catheter placement. 2355 test dose, then catheter taped in place and incrementally bolused as charted. Patient states placement hurt \"worse than any other time I got it\". 0012 PCEA instructions given and patient expresses understanding. No longer feeling contractions.

## 2021-05-05 NOTE — FLOWSHEET NOTE
Pt assisted to restroom with 2 people due to right leg being tingly pt voids without difficulty pericare instruction given and pt assisted back to bed with 2 people instructed to call for assistance to get up the next time

## 2021-05-05 NOTE — FLOWSHEET NOTE
Willem Sung CRNA called by this nurse to notify of epidural request per patient. No answer at this time. Voicemail left to call back and let me know she received notification. c/w synthroid

## 2021-05-05 NOTE — LACTATION NOTE
Lactation education:    [x] Latch/ good latch vs shallow latch/ steps to obtaining deep latch    [x] How to know if infant is eating enough/ feedings per 24 hours, wet/dirty diapers    [x] Feeding/satiety cues      Lactation education resources given:     [x]  How to Breastfeed your baby - [address] publication      [x]  Information on feeding cues     [x]  Support group handout    [x]  Breastpump cleaning guidelines - [facility]     [x]  Breastfeeding & Safe Sleep handout - [address] publication    [x]  Calling All Dads! Handout - [address] publication    []  Breast and Nipple Care - [facility]     []  [address]    []  [place]    []  Going Back to Work - [facility]    []  Preventing Engorgement - [facility]    Supplies given:    []  Brush, soap and basin for breastpump cleaning    []  Insurance pump provided     []  [facility] pump set up for patient to use    Explained to patient, patient verbalizes understanding.         Signed:  Ronel Messer RN, BSN, IBCLC

## 2021-05-05 NOTE — FLOWSHEET NOTE
Patient returns from bathroom after void with slight improvement with pain, but requesting epidural.  Alexandre Patel CRNA left voicemail to call back for epidural need for patient request.

## 2021-05-05 NOTE — L&D DELIVERY SUMMARY NOTE
Department of Obstetrics and Gynecology  Spontaneous Vaginal Delivery Note    Labor & Delivery Summary  Dilation Complete Date: 21  Dilation Complete Time: 525    Pre-operative Diagnosis:  Term pregnancy Lower score BPP 2021 (6/10)    Post-operative Diagnosis:  Same, delivered, Term single living child    Procedure:  Spontaneous vaginal delivery    Surgeon:  Vinh Wadsworth M.D. Information for the patient's :  Margaretde Scheuermann [[id number]]          Anesthesia:  epidural anesthesia    Estimated blood loss:  300ml    Specimen:  Placenta was sent for pathological analysis. Umbilical cord blood sent Yes    Umbilical cord blood gases: not required    Complications:  none    Condition:  infant stable to general nursery    Details of Procedure: The patient is a 31 y.o. female at 36w4d   OB History        5    Para   4    Term   4            AB        Living   4       SAB        TAB        Ectopic        Molar        Multiple   0    Live Births   4             who was admitted for induction. She received the following interventions: ARBOW and IV Pitocin induction The patient progressed well,did receive an epidural, became complete and started to push. After pushing for 2 times the fetal head was at the perineum. The fetal head was then gently delivered. The nose and mouth were suctioned with bulb suction and the rest of the infant delivered atraumatically, placed on mother abdomen. The cord was clamped and cut and infant handed off to the waiting nurse for evaluation. The delivery of the placenta was spontaneous. The perineum and vagina were explored and no lacerations were seen.

## 2021-05-05 NOTE — FLOWSHEET NOTE
Time out for epidural was performed with myself and NADIYA Fernandez at 2350. Catheter was in place at [phone] with test dose delivered at 2355. Patient tolerated procedure well with support from MARINA Grande at bedside. Laid down to left tilt at 0008 and procedure complete 0015.

## 2021-05-05 NOTE — FLOWSHEET NOTE
Updated Monica Ortegaummer with contraction frequency every 2-4. Reactive fetal tracing at this time. Reported current pain level, which patient relates in her back of 3-4/10. Order received and read back for 5 mg Nubain when she needs it and to start pitocin per protocol. Patient may have epidural when she desires. No further orders at this time.

## 2021-05-05 NOTE — PLAN OF CARE
Problem: Anxiety:  Goal: Level of anxiety will decrease  Description: Level of anxiety will decrease  5/5/2021 0940 by Jennifer Benton RN  Outcome: Ongoing  5/4/2021 2034 by Tressa Edwards RN  Outcome: Ongoing     Problem: Breathing Pattern - Ineffective:  Goal: Able to breathe comfortably  Description: Able to breathe comfortably  5/5/2021 0940 by Jennifer Benton RN  Outcome: Completed  5/4/2021 2034 by Tressa Edwards RN  Outcome: Ongoing     Problem: Fluid Volume - Imbalance:  Goal: Absence of imbalanced fluid volume signs and symptoms  Description: Absence of imbalanced fluid volume signs and symptoms  5/5/2021 0940 by Jennifer Benton RN  Outcome: Ongoing  5/4/2021 2034 by Tressa Edwards RN  Outcome: Ongoing  Goal: Absence of intrapartum hemorrhage signs and symptoms  Description: Absence of intrapartum hemorrhage signs and symptoms  5/5/2021 0940 by Jennifer Benton RN  Outcome: Completed  5/4/2021 2034 by Tressa Edwards RN  Outcome: Ongoing     Problem: Infection - Intrapartum Infection:  Goal: Will show no infection signs and symptoms  Description: Will show no infection signs and symptoms  5/5/2021 0940 by Jennifer Benton RN  Outcome: Completed  5/4/2021 2034 by Tressa Edwards RN  Outcome: Ongoing     Problem: Labor Process - Prolonged:  Goal: Labor progression, first stage, within specified pattern  Description: Labor progression, first stage, within specified pattern  5/5/2021 0940 by Jennifer Benton RN  Outcome: Completed  5/4/2021 2034 by Tressa Edwards RN  Outcome: Ongoing  Goal: Labor progession, second stage, within specified pattern  Description: Labor progession, second stage, within specified pattern  5/5/2021 0940 by Jennifer Benton RN  Outcome: Completed  5/4/2021 2034 by Tressa Edwards RN  Outcome: Ongoing  Goal: Uterine contractions within specified parameters  Description: Uterine contractions within specified parameters  5/5/2021 0940 by Jennifer Benton RN  Outcome: Completed  2021 by Nuno Marin RN  Outcome: Ongoing     Problem:  Screening:  Goal: Ability to make informed decisions regarding treatment has improved  Description: Ability to make informed decisions regarding treatment has improved  2021 by Enma Solitario RN  Outcome: Completed  2021 by Nuno Marin RN  Outcome: Ongoing     Problem: Pain - Acute:  Goal: Pain level will decrease  Description: Pain level will decrease  2021 0940 by Enma Solitario RN  Outcome: Ongoing  2021 by Nuno Marin RN  Outcome: Ongoing  Goal: Able to cope with pain  Description: Able to cope with pain  2021 by Enma Solitario RN  Outcome: Ongoing  2021 by Nuno Marin RN  Outcome: Ongoing     Problem: Tissue Perfusion - Uteroplacental, Altered:  Description: [TRUNCATED] For intrapartum patients with recurrent variable decelerations of the fetal heart rate, consider transcervical amnioinfusion. For patients in labor, avoid prophylactic use of continuous maternal oxygen supplementation to prevent nonreassu . .. Goal: Absence of abnormal fetal heart rate pattern  Description: Absence of abnormal fetal heart rate pattern  2021 by Enma Solitario RN  Outcome: Completed  2021 by Nuno Marin RN  Outcome: Ongoing     Problem: Urinary Retention:  Goal: Experiences of bladder distention will decrease  Description: Experiences of bladder distention will decrease  2021 0940 by Enma Solitario RN  Outcome: Ongoing  2021 by Nuno Marin RN  Outcome: Ongoing  Goal: Urinary elimination within specified parameters  Description: Urinary elimination within specified parameters  2021 by Enma Solitario RN  Outcome: Ongoing  2021 by Nuno Marin RN  Outcome: Ongoing     Problem: Pain:  Description: Pain management should include both nonpharmacologic and pharmacologic interventions.   Goal: Pain level will decrease  Description: Pain level will decrease  5/5/2021 0940 by Mata Hayden RN  Outcome: Ongoing  5/4/2021 2034 by Keshav Preston RN  Outcome: Ongoing  Goal: Control of acute pain  Description: Control of acute pain  5/5/2021 0940 by Mata Hayden RN  Outcome: Ongoing  5/4/2021 2034 by Keshav Preston RN  Outcome: Ongoing  Goal: Control of chronic pain  Description: Control of chronic pain  5/5/2021 0940 by Mata Hayden RN  Outcome: Ongoing  5/4/2021 2034 by Keshav Preston RN  Outcome: Ongoing     Problem: Discharge Planning:  Goal: Discharged to appropriate level of care  Description: Discharged to appropriate level of care  Outcome: Ongoing     Problem: Constipation:  Goal: Bowel elimination is within specified parameters  Description: Bowel elimination is within specified parameters  Outcome: Ongoing

## 2021-05-05 NOTE — FLOWSHEET NOTE
Dr. Harriett Gillette called in and requests an update. Made aware of SVE just now. Current pitocin rate of 6 with contractions about 2-3 min apart. Reviewed position changes as well as current discomfort reported with lower abdomen with contractions. Reviewed fetal tracing as well. No new orders at this time.

## 2021-05-05 NOTE — PLAN OF CARE
Problem: Anxiety:  Goal: Level of anxiety will decrease  Description: Level of anxiety will decrease  5/4/2021 2034 by Stevie Treviño RN  Outcome: Ongoing  5/4/2021 1556 by Sesar Valverde RN  Outcome: Ongoing     Problem: Breathing Pattern - Ineffective:  Goal: Able to breathe comfortably  Description: Able to breathe comfortably  5/4/2021 2034 by Stevie Treviño RN  Outcome: Ongoing  5/4/2021 1556 by Sesar Valverde RN  Outcome: Ongoing     Problem: Fluid Volume - Imbalance:  Goal: Absence of imbalanced fluid volume signs and symptoms  Description: Absence of imbalanced fluid volume signs and symptoms  5/4/2021 2034 by Stevie Treviño RN  Outcome: Ongoing  5/4/2021 1556 by Sesar Valverde RN  Outcome: Ongoing  Goal: Absence of intrapartum hemorrhage signs and symptoms  Description: Absence of intrapartum hemorrhage signs and symptoms  5/4/2021 2034 by Stevie Treviño RN  Outcome: Ongoing  5/4/2021 1556 by Sesar Valverde RN  Outcome: Ongoing     Problem: Infection - Intrapartum Infection:  Goal: Will show no infection signs and symptoms  Description: Will show no infection signs and symptoms  5/4/2021 2034 by Stevie Treviño RN  Outcome: Ongoing  5/4/2021 1556 by Sesar Valverde RN  Outcome: Ongoing     Problem: Labor Process - Prolonged:  Goal: Labor progression, first stage, within specified pattern  Description: Labor progression, first stage, within specified pattern  5/4/2021 2034 by Stevie Treviño RN  Outcome: Ongoing  5/4/2021 1556 by Sesar Valverde RN  Outcome: Ongoing  Goal: Labor progession, second stage, within specified pattern  Description: Labor progession, second stage, within specified pattern  5/4/2021 2034 by Stevie Treviño RN  Outcome: Ongoing  5/4/2021 1556 by Sesar Valverde RN  Outcome: Ongoing  Goal: Uterine contractions within specified parameters  Description: Uterine contractions within specified parameters  5/4/2021 2034 by Stevie Treviño RN  Outcome: Ongoing  5/4/2021 1556 by Sesar Valverde RN  Outcome: Ongoing     Problem:  Screening:  Goal: Ability to make informed decisions regarding treatment has improved  Description: Ability to make informed decisions regarding treatment has improved  2021 by Melissa Joyce RN  Outcome: Ongoing  2021 1556 by Radha Marinelli RN  Outcome: Ongoing     Problem: Pain - Acute:  Goal: Pain level will decrease  Description: Pain level will decrease  2021 by Melissa Joyce RN  Outcome: Ongoing  2021 1556 by Radha Marinelli RN  Outcome: Ongoing  Goal: Able to cope with pain  Description: Able to cope with pain  2021 by Melissa Joyce RN  Outcome: Ongoing  2021 1556 by Radha Marinelli RN  Outcome: Ongoing     Problem: Tissue Perfusion - Uteroplacental, Altered:  Goal: Absence of abnormal fetal heart rate pattern  Description: Absence of abnormal fetal heart rate pattern  2021 by Melissa Joyce RN  Outcome: Ongoing  2021 1556 by Radha Marinelli RN  Outcome: Ongoing     Problem: Urinary Retention:  Goal: Experiences of bladder distention will decrease  Description: Experiences of bladder distention will decrease  2021 by Melissa Joyce RN  Outcome: Ongoing  2021 1556 by Radha Marinelli RN  Outcome: Ongoing  Goal: Urinary elimination within specified parameters  Description: Urinary elimination within specified parameters  2021 by Melissa Joyce RN  Outcome: Ongoing  2021 1556 by Radha Marinelli RN  Outcome: Ongoing     Problem: Pain:  Goal: Pain level will decrease  Description: Pain level will decrease  2021 by Melissa Joyce RN  Outcome: Ongoing  2021 1556 by Radha Marinelli RN  Outcome: Ongoing

## 2021-05-06 VITALS
RESPIRATION RATE: 16 BRPM | TEMPERATURE: 98.3 F | DIASTOLIC BLOOD PRESSURE: 87 MMHG | OXYGEN SATURATION: 94 % | SYSTOLIC BLOOD PRESSURE: 121 MMHG | HEART RATE: 90 BPM

## 2021-05-06 PROBLEM — O47.9 UTERINE CONTRACTIONS DURING PREGNANCY: Status: RESOLVED | Noted: 2021-04-19 | Resolved: 2021-05-06

## 2021-05-06 PROBLEM — O47.00 PRETERM CONTRACTIONS: Status: RESOLVED | Noted: 2019-05-25 | Resolved: 2021-05-06

## 2021-05-06 LAB — SURGICAL PATHOLOGY REPORT: NORMAL

## 2021-05-06 PROCEDURE — 99024 POSTOP FOLLOW-UP VISIT: CPT | Performed by: OBSTETRICS & GYNECOLOGY

## 2021-05-06 PROCEDURE — 6370000000 HC RX 637 (ALT 250 FOR IP): Performed by: OBSTETRICS & GYNECOLOGY

## 2021-05-06 RX ORDER — IBUPROFEN 800 MG/1
800 TABLET ORAL EVERY 8 HOURS SCHEDULED
Qty: 120 TABLET | Refills: 3 | Status: SHIPPED | OUTPATIENT
Start: 2021-05-06

## 2021-05-06 RX ORDER — ACETAMINOPHEN AND CODEINE PHOSPHATE 300; 30 MG/1; MG/1
1 TABLET ORAL EVERY 4 HOURS PRN
Qty: 18 TABLET | Refills: 0 | Status: SHIPPED | OUTPATIENT
Start: 2021-05-06 | End: 2021-05-09

## 2021-05-06 RX ADMIN — DOCUSATE SODIUM 100 MG: 100 CAPSULE, LIQUID FILLED ORAL at 09:02

## 2021-05-06 RX ADMIN — ACETAMINOPHEN 1000 MG: 500 TABLET, FILM COATED ORAL at 00:32

## 2021-05-06 RX ADMIN — IBUPROFEN 800 MG: 800 TABLET, FILM COATED ORAL at 07:36

## 2021-05-06 ASSESSMENT — PAIN SCALES - GENERAL
PAINLEVEL_OUTOF10: 5
PAINLEVEL_OUTOF10: 6

## 2021-05-06 NOTE — DISCHARGE SUMMARY
Vaginal Delivery Discharge Summary    Gestational Age:38w3d    Antepartum complications: none    Date of Delivery:   Information for the patient's :  Wendy Anthony [[id number]]   6471         Type of Delivery: Vaginal     Labs: CBC   Lab Results   Component Value Date    HGB 11.3 (L) 2021    HCT 35.6 (L) 2021        Intrapartum complications: None    Postpartum complications: back pain    Discharge Medications:    Talha Hernández   Home Medication Instructions XHO:[id number]    Printed on:21 3432   Medication Information                      acetaminophen-codeine (TYLENOL/CODEINE #3) 300-30 MG per tablet  Take 1 tablet by mouth every 4 hours as needed for Pain for up to 3 days. Intended supply: 3 days. Take lowest dose possible to manage pain             ferrous sulfate (IRON 325) 325 (65 Fe) MG tablet  Take 325 mg by mouth daily (with breakfast)             ibuprofen (ADVIL;MOTRIN) 800 MG tablet  Take 1 tablet by mouth every 8 hours             omeprazole (PRILOSEC) 10 MG delayed release capsule  Take 10 mg by mouth daily             Prenatal Vit-Fe Fum-FA-Omega (PRENATAL FORMULA PO)  Take by mouth                 The patient is ambulating well. The patient is tolerating a normal diet.     Discharged to: Home     Discharge Date: 2021    Plan:   Follow up in 6 week(s)    Dash Monge

## 2021-05-06 NOTE — FLOWSHEET NOTE
Patient off unit in stable condition. Departure Mode: with significant other.     Mobility at Departure: wheelchair    Discharged to: private residence    Time of Discharge: [phone]

## 2021-05-06 NOTE — PLAN OF CARE
Problem: Anxiety:  Goal: Level of anxiety will decrease  Description: Level of anxiety will decrease  Outcome: Ongoing     Problem: Fluid Volume - Imbalance:  Goal: Absence of imbalanced fluid volume signs and symptoms  Description: Absence of imbalanced fluid volume signs and symptoms  Outcome: Ongoing     Problem: Pain - Acute:  Goal: Pain level will decrease  Description: Pain level will decrease  Outcome: Ongoing  Goal: Able to cope with pain  Description: Able to cope with pain  Outcome: Ongoing     Problem: Urinary Retention:  Goal: Experiences of bladder distention will decrease  Description: Experiences of bladder distention will decrease  Outcome: Ongoing  Goal: Urinary elimination within specified parameters  Description: Urinary elimination within specified parameters  Outcome: Ongoing     Problem: Pain:  Description: Pain management should include both nonpharmacologic and pharmacologic interventions.   Goal: Pain level will decrease  Description: Pain level will decrease  Outcome: Ongoing  Goal: Control of acute pain  Description: Control of acute pain  Outcome: Ongoing  Goal: Control of chronic pain  Description: Control of chronic pain  Outcome: Ongoing     Problem: Discharge Planning:  Goal: Discharged to appropriate level of care  Description: Discharged to appropriate level of care  Outcome: Ongoing     Problem: Constipation:  Goal: Bowel elimination is within specified parameters  Description: Bowel elimination is within specified parameters  Outcome: Ongoing     Problem: Falls - Risk of:  Goal: Will remain free from falls  Description: Will remain free from falls  Outcome: Ongoing  Goal: Absence of physical injury  Description: Absence of physical injury  Outcome: Ongoing

## 2021-07-21 PROBLEM — Z34.90 TERM PREGNANCY: Status: RESOLVED | Noted: 2019-06-23 | Resolved: 2021-07-21

## 2021-07-21 PROBLEM — N76.4 LABIAL ABSCESS: Status: RESOLVED | Noted: 2019-07-14 | Resolved: 2021-07-21

## 2021-07-26 ENCOUNTER — HOSPITAL ENCOUNTER (EMERGENCY)
Age: 31
Discharge: HOME | End: 2021-07-26
Payer: COMMERCIAL

## 2021-07-26 ENCOUNTER — APPOINTMENT (OUTPATIENT)
Dept: CT IMAGING | Age: 31
End: 2021-07-26
Payer: COMMERCIAL

## 2021-07-26 ENCOUNTER — APPOINTMENT (OUTPATIENT)
Dept: ULTRASOUND IMAGING | Age: 31
End: 2021-07-26
Payer: COMMERCIAL

## 2021-07-26 VITALS
HEART RATE: 85 BPM | RESPIRATION RATE: 18 BRPM | DIASTOLIC BLOOD PRESSURE: 91 MMHG | TEMPERATURE: 98.3 F | OXYGEN SATURATION: 98 % | SYSTOLIC BLOOD PRESSURE: 123 MMHG

## 2021-07-26 DIAGNOSIS — N89.8 VAGINAL DISCHARGE: ICD-10-CM

## 2021-07-26 DIAGNOSIS — K80.20 GALL STONES: ICD-10-CM

## 2021-07-26 DIAGNOSIS — R10.2 PELVIC PAIN: Primary | ICD-10-CM

## 2021-07-26 LAB
-: NORMAL
ABSOLUTE EOS #: 0.11 K/UL (ref 0–0.44)
ABSOLUTE IMMATURE GRANULOCYTE: 0.03 K/UL (ref 0–0.3)
ABSOLUTE LYMPH #: 3.72 K/UL (ref 1.1–3.7)
ABSOLUTE MONO #: 0.47 K/UL (ref 0.1–1.2)
ALBUMIN SERPL-MCNC: 4.1 G/DL (ref 3.5–5.2)
ALBUMIN/GLOBULIN RATIO: 1.3 (ref 1–2.5)
ALP BLD-CCNC: 101 U/L (ref 35–104)
ALT SERPL-CCNC: 46 U/L (ref 5–33)
AMORPHOUS: NORMAL
ANION GAP SERPL CALCULATED.3IONS-SCNC: 10 MMOL/L (ref 9–17)
AST SERPL-CCNC: 32 U/L
BACTERIA: NORMAL
BASOPHILS # BLD: 0 % (ref 0–2)
BASOPHILS ABSOLUTE: 0.04 K/UL (ref 0–0.2)
BILIRUB SERPL-MCNC: 0.25 MG/DL (ref 0.3–1.2)
BILIRUBIN URINE: NEGATIVE
BUN BLDV-MCNC: 16 MG/DL (ref 6–20)
BUN/CREAT BLD: 23 (ref 9–20)
CALCIUM SERPL-MCNC: 9.7 MG/DL (ref 8.6–10.4)
CASTS UA: NORMAL /LPF
CHLORIDE BLD-SCNC: 102 MMOL/L (ref 98–107)
CO2: 26 MMOL/L (ref 20–31)
COLOR: YELLOW
COMMENT UA: ABNORMAL
CREAT SERPL-MCNC: 0.69 MG/DL (ref 0.5–0.9)
CRYSTALS, UA: NORMAL /HPF
DIFFERENTIAL TYPE: ABNORMAL
DIRECT EXAM: NORMAL
EOSINOPHILS RELATIVE PERCENT: 1 % (ref 1–4)
EPITHELIAL CELLS UA: NORMAL /HPF (ref 0–25)
GFR AFRICAN AMERICAN: >60 ML/MIN
GFR NON-AFRICAN AMERICAN: >60 ML/MIN
GFR SERPL CREATININE-BSD FRML MDRD: ABNORMAL ML/MIN/{1.73_M2}
GFR SERPL CREATININE-BSD FRML MDRD: ABNORMAL ML/MIN/{1.73_M2}
GLUCOSE BLD-MCNC: 84 MG/DL (ref 70–99)
GLUCOSE URINE: NEGATIVE
HCT VFR BLD CALC: 44.2 % (ref 36.3–47.1)
HEMOGLOBIN: 14.1 G/DL (ref 11.9–15.1)
IMMATURE GRANULOCYTES: 0 %
KETONES, URINE: NEGATIVE
LACTIC ACID, WHOLE BLOOD: NORMAL MMOL/L (ref 0.7–2.1)
LACTIC ACID: 0.6 MMOL/L (ref 0.5–2.2)
LEUKOCYTE ESTERASE, URINE: NEGATIVE
LIPASE: 17 U/L (ref 13–60)
LYMPHOCYTES # BLD: 41 % (ref 24–43)
Lab: NORMAL
MCH RBC QN AUTO: 28.2 PG (ref 25.2–33.5)
MCHC RBC AUTO-ENTMCNC: 31.9 G/DL (ref 28.4–34.8)
MCV RBC AUTO: 88.4 FL (ref 82.6–102.9)
MONOCYTES # BLD: 5 % (ref 3–12)
MUCUS: NORMAL
NITRITE, URINE: NEGATIVE
NRBC AUTOMATED: 0 PER 100 WBC
OTHER OBSERVATIONS UA: NORMAL
PDW BLD-RTO: 14.4 % (ref 11.8–14.4)
PH UA: 6 (ref 5–9)
PLATELET # BLD: 268 K/UL (ref 138–453)
PLATELET ESTIMATE: ABNORMAL
PMV BLD AUTO: 9.6 FL (ref 8.1–13.5)
POTASSIUM SERPL-SCNC: 4.2 MMOL/L (ref 3.7–5.3)
PROTEIN UA: NEGATIVE
RBC # BLD: 5 M/UL (ref 3.95–5.11)
RBC # BLD: ABNORMAL 10*6/UL
RBC UA: NORMAL /HPF (ref 0–2)
RENAL EPITHELIAL, UA: NORMAL /HPF
SEG NEUTROPHILS: 53 % (ref 36–65)
SEGMENTED NEUTROPHILS ABSOLUTE COUNT: 4.75 K/UL (ref 1.5–8.1)
SODIUM BLD-SCNC: 138 MMOL/L (ref 135–144)
SPECIFIC GRAVITY UA: 1.02 (ref 1.01–1.02)
SPECIMEN DESCRIPTION: NORMAL
TOTAL PROTEIN: 7.3 G/DL (ref 6.4–8.3)
TRICHOMONAS: NORMAL
TURBIDITY: CLEAR
URINE HGB: NEGATIVE
UROBILINOGEN, URINE: NORMAL
WBC # BLD: 9.1 K/UL (ref 3.5–11.3)
WBC # BLD: ABNORMAL 10*3/UL
WBC UA: NORMAL /HPF (ref 0–5)
YEAST: NORMAL

## 2021-07-26 PROCEDURE — 80053 COMPREHEN METABOLIC PANEL: CPT

## 2021-07-26 PROCEDURE — 36415 COLL VENOUS BLD VENIPUNCTURE: CPT

## 2021-07-26 PROCEDURE — 96376 TX/PRO/DX INJ SAME DRUG ADON: CPT

## 2021-07-26 PROCEDURE — 74177 CT ABD & PELVIS W/CONTRAST: CPT

## 2021-07-26 PROCEDURE — 87210 SMEAR WET MOUNT SALINE/INK: CPT

## 2021-07-26 PROCEDURE — 99283 EMERGENCY DEPT VISIT LOW MDM: CPT

## 2021-07-26 PROCEDURE — 87591 N.GONORRHOEAE DNA AMP PROB: CPT

## 2021-07-26 PROCEDURE — 85025 COMPLETE CBC W/AUTO DIFF WBC: CPT

## 2021-07-26 PROCEDURE — 81001 URINALYSIS AUTO W/SCOPE: CPT

## 2021-07-26 PROCEDURE — 83690 ASSAY OF LIPASE: CPT

## 2021-07-26 PROCEDURE — 96365 THER/PROPH/DIAG IV INF INIT: CPT

## 2021-07-26 PROCEDURE — 83605 ASSAY OF LACTIC ACID: CPT

## 2021-07-26 PROCEDURE — 6370000000 HC RX 637 (ALT 250 FOR IP): Performed by: PHYSICIAN ASSISTANT

## 2021-07-26 PROCEDURE — 96375 TX/PRO/DX INJ NEW DRUG ADDON: CPT

## 2021-07-26 PROCEDURE — 76830 TRANSVAGINAL US NON-OB: CPT

## 2021-07-26 PROCEDURE — 2580000003 HC RX 258: Performed by: PHYSICIAN ASSISTANT

## 2021-07-26 PROCEDURE — 6360000004 HC RX CONTRAST MEDICATION: Performed by: PHYSICIAN ASSISTANT

## 2021-07-26 PROCEDURE — 6360000002 HC RX W HCPCS: Performed by: PHYSICIAN ASSISTANT

## 2021-07-26 RX ORDER — MORPHINE SULFATE 2 MG/ML
2 INJECTION, SOLUTION INTRAMUSCULAR; INTRAVENOUS ONCE
Status: COMPLETED | OUTPATIENT
Start: 2021-07-26 | End: 2021-07-26

## 2021-07-26 RX ORDER — ONDANSETRON 2 MG/ML
4 INJECTION INTRAMUSCULAR; INTRAVENOUS ONCE
Status: COMPLETED | OUTPATIENT
Start: 2021-07-26 | End: 2021-07-26

## 2021-07-26 RX ORDER — DOXYCYCLINE HYCLATE 100 MG/1
100 CAPSULE ORAL 2 TIMES DAILY
Qty: 20 CAPSULE | Refills: 0 | Status: SHIPPED | OUTPATIENT
Start: 2021-07-26 | End: 2021-07-26 | Stop reason: SINTOL

## 2021-07-26 RX ORDER — 0.9 % SODIUM CHLORIDE 0.9 %
1000 INTRAVENOUS SOLUTION INTRAVENOUS ONCE
Status: COMPLETED | OUTPATIENT
Start: 2021-07-26 | End: 2021-07-26

## 2021-07-26 RX ORDER — AZITHROMYCIN 250 MG/1
1000 TABLET, FILM COATED ORAL ONCE
Status: COMPLETED | OUTPATIENT
Start: 2021-07-26 | End: 2021-07-26

## 2021-07-26 RX ADMIN — MORPHINE SULFATE 2 MG: 2 INJECTION, SOLUTION INTRAMUSCULAR; INTRAVENOUS at 18:35

## 2021-07-26 RX ADMIN — AZITHROMYCIN MONOHYDRATE 1000 MG: 250 TABLET ORAL at 20:31

## 2021-07-26 RX ADMIN — ONDANSETRON 4 MG: 2 INJECTION INTRAMUSCULAR; INTRAVENOUS at 17:41

## 2021-07-26 RX ADMIN — CEFTRIAXONE 1000 MG: 1 INJECTION, POWDER, FOR SOLUTION INTRAMUSCULAR; INTRAVENOUS at 19:37

## 2021-07-26 RX ADMIN — MORPHINE SULFATE 2 MG: 2 INJECTION, SOLUTION INTRAMUSCULAR; INTRAVENOUS at 17:41

## 2021-07-26 RX ADMIN — SODIUM CHLORIDE 1000 ML: 9 INJECTION, SOLUTION INTRAVENOUS at 17:47

## 2021-07-26 RX ADMIN — IOPAMIDOL 75 ML: 755 INJECTION, SOLUTION INTRAVENOUS at 19:25

## 2021-07-26 ASSESSMENT — ENCOUNTER SYMPTOMS
ABDOMINAL PAIN: 0
CONSTIPATION: 0
VOMITING: 0
NAUSEA: 0
EYE REDNESS: 0
DIARRHEA: 0
CHEST TIGHTNESS: 0
SORE THROAT: 0
SHORTNESS OF BREATH: 0
EYE DISCHARGE: 0
COUGH: 0
BLOOD IN STOOL: 0
WHEEZING: 0
RHINORRHEA: 0
BACK PAIN: 0

## 2021-07-26 ASSESSMENT — PAIN DESCRIPTION - PAIN TYPE
TYPE: ACUTE PAIN
TYPE: ACUTE PAIN

## 2021-07-26 ASSESSMENT — PAIN DESCRIPTION - ORIENTATION
ORIENTATION: RIGHT;LOWER
ORIENTATION: LOWER

## 2021-07-26 ASSESSMENT — PAIN DESCRIPTION - LOCATION
LOCATION: ABDOMEN
LOCATION: ABDOMEN

## 2021-07-26 ASSESSMENT — PAIN SCALES - GENERAL
PAINLEVEL_OUTOF10: 7
PAINLEVEL_OUTOF10: 8
PAINLEVEL_OUTOF10: 6
PAINLEVEL_OUTOF10: 7
PAINLEVEL_OUTOF10: 5

## 2021-07-26 ASSESSMENT — PAIN DESCRIPTION - DESCRIPTORS
DESCRIPTORS: STABBING;SHARP
DESCRIPTORS: CRAMPING;STABBING

## 2021-07-26 ASSESSMENT — PAIN DESCRIPTION - FREQUENCY: FREQUENCY: CONTINUOUS

## 2021-07-26 NOTE — ED PROVIDER NOTES
[facility] ED  EMERGENCY DEPARTMENT ENCOUNTER      Pt Name: Montana Castillo  MRN: [id number]  [place] 1990  Date of evaluation: 7/26/2021  Provider: Erlinda Hdez Dr     Chief Complaint   Patient presents with    Post-op Problem     patient had a tugal ligation last week. she is having increasing abd pain since. HISTORY OF PRESENT ILLNESS   (Location/Symptom, Timing/Onset, Context/Setting,Quality, Duration, Modifying Factors, Severity)  Note limiting factors. Montana Castillo is a30 y.o. female who presents to the emergency department with complaints of lower pelvic discomfort that is been ongoing for the last several days. Patient reports that she had a tubal ligation at the end of last week. She states that she was told she had some pain following the procedure but states she has had worsening pain over the past several days. Reports that she is been on Percocet as well as Phenergan. She reports that they are not helping with her symptoms. She states she called her OB/GYN who told her to come the ER for further evaluation. She otherwise denies any fever or chills reports she is having bowel movements and passing gas. Denies any other complaints at this time. HPI    Nursing Notes werereviewed. REVIEW OF SYSTEMS    (2-9 systems for level 4, 10 or more for level 5)     Review of Systems   Constitutional: Negative for chills, diaphoresis and fever. HENT: Negative for congestion, ear pain, rhinorrhea and sore throat. Eyes: Negative for discharge, redness and visual disturbance. Respiratory: Negative for cough, chest tightness, shortness of breath and wheezing. Cardiovascular: Negative for chest pain and palpitations. Gastrointestinal: Negative for abdominal pain, blood in stool, constipation, diarrhea, nausea and vomiting. Endocrine: Negative for polydipsia, polyphagia and polyuria. Genitourinary: Positive for pelvic pain.  Negative for decreased urine volume, difficulty urinating, dysuria, frequency and hematuria. Musculoskeletal: Negative for arthralgias, back pain and myalgias. Skin: Negative for pallor and rash. Allergic/Immunologic: Negative for food allergies and immunocompromised state. Neurological: Negative for dizziness, syncope, weakness and light-headedness. Hematological: Negative for adenopathy. Does not bruise/bleed easily. Psychiatric/Behavioral: Negative for behavioral problems and suicidal ideas. The patient is not nervous/anxious. Except as noted above the remainder of the review of systems was reviewed and negative.        PAST MEDICAL HISTORY     Past Medical History:   Diagnosis Date    Anemia     GERD (gastroesophageal reflux disease)     Kidney stones     Postpartum depression          SURGICALHISTORY       Past Surgical History:   Procedure Laterality Date    EXTERNAL EAR SURGERY Left     LITHOTRIPSY Right 08/25/2020    ESWL EXTRACORPOREAL SHOCK WAVE LITHOTRIPSY     LITHOTRIPSY Right 8/25/2020    ESWL EXTRACORPOREAL SHOCK WAVE LITHOTRIPSY performed by Nayan Mccabe MD at [address] [place] [address] Right 5/26/2016    right cysto HLL with stent exchange    TUBAL LIGATION N/A 7/21/2021    TUBAL LIGATION performed by Sheila Thomas MD at [facility]      LABIA HEMATOMA         CURRENT MEDICATIONS       Current Discharge Medication List      CONTINUE these medications which have NOT CHANGED    Details   ibuprofen (ADVIL;MOTRIN) 800 MG tablet Take 1 tablet by mouth every 8 hours  Qty: 120 tablet, Refills: 3      omeprazole (PRILOSEC) 10 MG delayed release capsule Take 10 mg by mouth daily               ALLERGIES   Reglan [metoclopramide] and Norco [hydrocodone-acetaminophen]    FAMILY HISTORY       Family History   Problem Relation Age of Onset    Ulcerative Colitis Father     Ulcerative Colitis Sister     Asthma Sister     Heart Disease Maternal Grandfather     Stroke Maternal Grandfather     High Blood Pressure Maternal Grandfather     Arthritis Maternal Grandmother     Vision Loss Maternal Grandmother           SOCIAL HISTORY       Social History     Socioeconomic History    Marital status:      Spouse name: Not on file    Number of children: Not on file    Years of education: Not on file    Highest education level: Not on file   Occupational History    Not on file   Tobacco Use    Smoking status: Never Smoker    Smokeless tobacco: Never Used   Vaping Use    Vaping Use: Never used   Substance and Sexual Activity    Alcohol use: Not Currently     Comment: SOCIAL    Drug use: No    Sexual activity: Yes     Partners: Male   Other Topics Concern    Not on file   Social History Narrative    Not on file     Social Determinants of Health     Financial Resource Strain:     Difficulty of Paying Living Expenses:    Food Insecurity:     Worried About Running Out of Food in the Last Year:     Ran Out of Food in the Last Year:    Transportation Needs:     Lack of Transportation (Medical):      Lack of Transportation (Non-Medical):    Physical Activity:     Days of Exercise per Week:     Minutes of Exercise per Session:    Stress:     Feeling of Stress :    Social Connections:     Frequency of Communication with Friends and Family:     Frequency of Social Gatherings with Friends and Family:     Attends [employer] Services:     Active Member of Clubs or Organizations:     Attends Club or Organization Meetings:     Marital Status:    Intimate Partner Violence:     Fear of Current or Ex-Partner:     Emotionally Abused:     Physically Abused:     Sexually Abused:        SCREENINGS             PHYSICAL EXAM    (up to 7 for level 4, 8 or more for level 5)     ED Triage Vitals [07/26/21 1546]   BP Temp Temp Source Pulse Resp SpO2 Height Weight   (!) 123/91 98.3 °F (36.8 °C) Tympanic 85 18 98 % -- --       Physical Exam  Vitals and nursing note reviewed. Constitutional:       General: She is not in acute distress. Appearance: Normal appearance. She is well-developed. She is not ill-appearing, toxic-appearing or diaphoretic. HENT:      Head: Normocephalic and atraumatic. Right Ear: External ear normal.      Left Ear: External ear normal.      Mouth/Throat:      Mouth: Mucous membranes are moist.      Pharynx: No oropharyngeal exudate. Eyes:      General: No scleral icterus. Right eye: No discharge. Left eye: No discharge. Conjunctiva/sclera: Conjunctivae normal.      Pupils: Pupils are equal, round, and reactive to light. Neck:      Thyroid: No thyromegaly. Trachea: No tracheal deviation. Cardiovascular:      Rate and Rhythm: Normal rate and regular rhythm. Heart sounds: No murmur heard. No friction rub. No gallop. Pulmonary:      Effort: Pulmonary effort is normal. No respiratory distress. Breath sounds: Normal breath sounds. No stridor. No wheezing. Abdominal:      General: Bowel sounds are normal. There is no distension. Palpations: Abdomen is soft. Tenderness: There is abdominal tenderness. There is no right CVA tenderness, left CVA tenderness, guarding or rebound. Musculoskeletal:         General: No tenderness or deformity. Normal range of motion. Cervical back: Normal range of motion and neck supple. Lymphadenopathy:      Cervical: No cervical adenopathy. Skin:     General: Skin is warm and dry. Capillary Refill: Capillary refill takes less than 2 seconds. Findings: No erythema or rash. Neurological:      General: No focal deficit present. Mental Status: She is alert and oriented to person, place, and time. Cranial Nerves: No cranial nerve deficit. Motor: No abnormal muscle tone. Deep Tendon Reflexes: Reflexes are normal and symmetric.  Reflexes normal.   Psychiatric:         Mood and Affect: Mood normal. Behavior: Behavior normal.         DIAGNOSTIC RESULTS     EKG: All EKG's are interpreted by the Emergency Department Physician who either signs orCo-signs this chart in the absence of a cardiologist.      RADIOLOGY:   Non-plainfilm images such as CT, Ultrasound and MRI are read by the radiologist. Plain radiographic images are visualized and preliminarily interpreted by the emergency physician with the below findings:      Interpretationper the Radiologist below, if available at the time of this note:    CT ABDOMEN PELVIS W IV CONTRAST Additional Contrast? None   Final Result   Nonobstructing renal stones bilaterally. Punctate layering stone in the gallbladder. No other acute finding in the abdomen and pelvis. US NON OB TRANSVAGINAL   Final Result   Unremarkable pelvic sonogram..  Color Doppler shows adequate vascularity   tothe right ovary. No adnexal masses. ED BEDSIDE ULTRASOUND:   Performed by ED Physician - none    LABS:  Labs Reviewed   CBC WITH AUTO DIFFERENTIAL - Abnormal; Notable for the following components:       Result Value    Absolute Lymph # 3.72 (*)     All other components within normal limits   COMPREHENSIVE METABOLIC PANEL - Abnormal; Notable for the following components:    Bun/Cre Ratio 23 (*)     ALT 46 (*)     AST 32 (*)     Total Bilirubin 0.25 (*)     All other components within normal limits   URINE RT REFLEX TO CULTURE - Abnormal; Notable for the following components:    Specific Gravity, UA 1.025 (*)     All other components within normal limits   NEISSERIA GONORRHOEAE DNA, URINE   WET PREP, GENITAL   LIPASE   LACTIC ACID, PLASMA   MICROSCOPIC URINALYSIS       All other labs were within normal range or not returned as of this dictation.     EMERGENCY DEPARTMENT COURSE and DIFFERENTIAL DIAGNOSIS/MDM:   Vitals:    Vitals:    07/26/21 1546   BP: (!) 123/91   Pulse: 85   Resp: 18   Temp: 98.3 °F (36.8 °C)   TempSrc: Tympanic   SpO2: 98% MDM  24-year-old female who is postop from tubal ligation presents secondary to pelvic discomfort. On exam her wounds are clean dry and intact. She is afebrile she is not tachycardic she is normotensive. Consideration made for torsion versus ovarian abscess versus infection dehydration electrolyte imbalance versus postoperative pain secondary to air. ED Course as of Jul 26 2012 Mon Jul 26, 2021   1857 Pelvic exam performed to evaluate adnexal discomfort given that I cannot visualize the ovary on the left. Exam performed with nurse, Meka, in the room. Patient had copious vaginal discharge cervical discharge. There is no significant adnexal tenderness significant CMT. [HH]      ED Course User Index  [HH] Won Nichols, [address]     Repeat evaluation patient is resting comfortably. Given that she is breast-feeding I called back and spoke with her OB/GYN regarding the CT findings. Recommends 1 dose of azithromycin and he will see her in the office on Wednesday. Patient is resting comfortably at this time and in no distress. I have updated patient on current results. We discussed at length the patient's diagnosis. Patient understands to follow up with primary care provider in the next 2 days. Patient verbalized understanding of this. We went over medications. Strict return warnings were given. Patient will return to the emergency room as needed with new or worsening complaints. She understands if she goes home and develops worsening pain fever cannot eat or drink she should return immediately to the ER. Otherwise Dr. Kenia Mckeon will see her at 9 AM Wednesday morning. Procedures    FINAL IMPRESSION      1. Pelvic pain    2. Vaginal discharge    3.  Gall stones        DISPOSITION/PLAN   DISPOSITION Decision To Discharge 07/26/2021 07:51:57 PM      PATIENT REFERRED TO:  [facility] ED  [address] [facility]  [phone]    If symptoms worsen, As needed    [facility] [address]  [phone]            DISCHARGE MEDICATIONS:  Current Discharge Medication List                 Summation      Patient Course:      ED Medications administered this visit:    Medications   azithromycin (ZITHROMAX) tablet 1,000 mg (has no administration in time range)   oxyCODONE-acetaminophen (PERCOCET) tablet 5-325 mg (2 tablet STARTER PACK) (has no administration in time range)   oxyCODONE-acetaminophen (PERCOCET) tablet 5-325 mg (2 tablet STARTER PACK) (has no administration in time range)   ondansetron (ZOFRAN) injection 4 mg (4 mg Intravenous Given 7/26/21 1741)   0.9 % sodium chloride bolus (1,000 mLs Intravenous New Bag 7/26/21 1747)   morphine (PF) injection 2 mg (2 mg Intravenous Given 7/26/21 1741)   morphine (PF) injection 2 mg (2 mg Intravenous Given 7/26/21 1835)   cefTRIAXone (ROCEPHIN) 1000 mg IVPB in 50 mL D5W minibag (1,000 mg Intravenous New Bag 7/26/21 1937)   iopamidol (ISOVUE-370) 76 % injection 75 mL (75 mLs Intravenous Given 7/26/21 1925)       New Prescriptions from this visit:    Current Discharge Medication List          Follow-up:  [facility] ED  [address]  [phone]    If symptoms worsen, As needed    Camillo Schaumann, MD  [place] [address]  [phone]              Final Impression:   1. Pelvic pain    2. Vaginal discharge    3.  Gall stones               (Please note that portions of this note were completed with a voice recognition program.  Efforts were made to edit the dictations but occasionally words are mis-transcribed.)           Raoul Ramos PA-C  07/26/21 2017

## 2021-07-26 NOTE — ED NOTES
Called Dr Julisa Gumaan via cell and connected with [address] [place] [address]  07/26/21 9591

## 2021-07-28 LAB
N. GONORRHOEAE DNA, URINE: NEGATIVE
SPECIMEN DESCRIPTION: NORMAL

## 2021-09-01 ENCOUNTER — TELEPHONE (OUTPATIENT)
Dept: UROLOGY | Age: 31
End: 2021-09-01

## 2021-09-24 ENCOUNTER — TELEPHONE (OUTPATIENT)
Dept: UROLOGY | Age: 31
End: 2021-09-24

## 2021-09-24 DIAGNOSIS — N20.0 RENAL CALCULUS: Primary | ICD-10-CM

## 2021-11-02 ENCOUNTER — TELEPHONE (OUTPATIENT)
Dept: UROLOGY | Age: 31
End: 2021-11-02

## 2021-11-02 DIAGNOSIS — N20.0 RENAL CALCULUS: Primary | ICD-10-CM

## 2021-11-05 NOTE — TELEPHONE ENCOUNTER
Patient called and rescheduled appointment with KUB prior.   Need new order for KUB - previous order cancelled

## 2021-11-16 ENCOUNTER — HOSPITAL ENCOUNTER (OUTPATIENT)
Dept: GENERAL RADIOLOGY | Age: 31
Discharge: HOME | End: 2021-11-18
Payer: COMMERCIAL

## 2021-11-16 ENCOUNTER — HOSPITAL ENCOUNTER (OUTPATIENT)
Age: 31
Discharge: HOME | End: 2021-11-18
Payer: COMMERCIAL

## 2021-11-16 ENCOUNTER — OFFICE VISIT (OUTPATIENT)
Dept: UROLOGY | Age: 31
End: 2021-11-16
Payer: COMMERCIAL

## 2021-11-16 VITALS
HEIGHT: 63 IN | BODY MASS INDEX: 25.52 KG/M2 | SYSTOLIC BLOOD PRESSURE: 108 MMHG | WEIGHT: 144 LBS | DIASTOLIC BLOOD PRESSURE: 72 MMHG | TEMPERATURE: 98.6 F

## 2021-11-16 DIAGNOSIS — N20.0 KIDNEY STONES: Primary | ICD-10-CM

## 2021-11-16 DIAGNOSIS — N20.0 RENAL CALCULUS: ICD-10-CM

## 2021-11-16 DIAGNOSIS — M25.561 RIGHT KNEE PAIN, UNSPECIFIED CHRONICITY: ICD-10-CM

## 2021-11-16 PROCEDURE — 73562 X-RAY EXAM OF KNEE 3: CPT

## 2021-11-16 PROCEDURE — 74018 RADEX ABDOMEN 1 VIEW: CPT

## 2021-11-16 PROCEDURE — 99213 OFFICE O/P EST LOW 20 MIN: CPT | Performed by: PHYSICIAN ASSISTANT

## 2021-11-16 PROCEDURE — G8419 CALC BMI OUT NRM PARAM NOF/U: HCPCS | Performed by: PHYSICIAN ASSISTANT

## 2021-11-16 PROCEDURE — 1036F TOBACCO NON-USER: CPT | Performed by: PHYSICIAN ASSISTANT

## 2021-11-16 PROCEDURE — G8484 FLU IMMUNIZE NO ADMIN: HCPCS | Performed by: PHYSICIAN ASSISTANT

## 2021-11-16 PROCEDURE — G8427 DOCREV CUR MEDS BY ELIG CLIN: HCPCS | Performed by: PHYSICIAN ASSISTANT

## 2021-11-16 RX ORDER — NEOMYCIN SULFATE, POLYMYXIN B SULFATE, HYDROCORTISONE 3.5; 10000; 1 MG/ML; [USP'U]/ML; MG/ML
SOLUTION/ DROPS AURICULAR (OTIC)
COMMUNITY
Start: 2021-11-09

## 2021-11-16 ASSESSMENT — ENCOUNTER SYMPTOMS
COUGH: 0
EYE REDNESS: 0
APNEA: 0
BACK PAIN: 0
COLOR CHANGE: 0
VOMITING: 0
SHORTNESS OF BREATH: 0
CONSTIPATION: 0
WHEEZING: 0
ABDOMINAL PAIN: 0
NAUSEA: 0

## 2021-11-16 NOTE — PROGRESS NOTES
HPI:    Patient is a 31 y.o. female in no acute distress. She is alert and oriented to person, place, and time. History  8/2020 right ESWL    Today  Patient is here today for follow-up kidney stones. Patient did pass multiple stones during her pregnancy. She states as though she may have passed a stone recently. She currently does not have any flank pain. She denies any fever, chills, gross hematuria, dysuria. Patient did have a KUB prior to visit today which is independently reviewed showing right kidney obscured by bowel contents. On the left there are at least two 3 mm renal calculi. Patient did have a CT abdomen pelvis with IV contrast 7/2021 this was also reviewed showing multiple bilateral renal calculi. Largest stone on the right was approximately 3 mm. There were two 4 mm stones on the left. There are multiple punctate stones bilaterally.        Past Medical History:   Diagnosis Date    Anemia     GERD (gastroesophageal reflux disease)     Kidney stones     Postpartum depression      Past Surgical History:   Procedure Laterality Date    EXTERNAL EAR SURGERY Left     LITHOTRIPSY Right 08/25/2020    ESWL EXTRACORPOREAL SHOCK WAVE LITHOTRIPSY     LITHOTRIPSY Right 8/25/2020    ESWL EXTRACORPOREAL SHOCK WAVE LITHOTRIPSY performed by Leo Nava MD at [address] [place] [address] Right 5/26/2016    right cysto HLL with stent exchange    TUBAL LIGATION N/A 7/21/2021    TUBAL LIGATION performed by Simona [facility]man MD at [facility] ENDOSCOPY      VAGINA SURGERY      LABIA HEMATOMA     Outpatient Encounter Medications as of 11/16/2021   Medication Sig Dispense Refill    ibuprofen (ADVIL;MOTRIN) 800 MG tablet Take 1 tablet by mouth every 8 hours 120 tablet 3    omeprazole (PRILOSEC) 10 MG delayed release capsule Take 10 mg by mouth daily      neomycin-polymyxin-hydrocortisone 1 % SOLN otic solution INSTILL 4 DROPS INTO LEFT EAR EVERY 6 HOURS FOR 7 TO 10 DAYS No facility-administered encounter medications on file as of 11/16/2021. Current Outpatient Medications on File Prior to Visit   Medication Sig Dispense Refill    ibuprofen (ADVIL;MOTRIN) 800 MG tablet Take 1 tablet by mouth every 8 hours 120 tablet 3    omeprazole (PRILOSEC) 10 MG delayed release capsule Take 10 mg by mouth daily      neomycin-polymyxin-hydrocortisone 1 % SOLN otic solution INSTILL 4 DROPS INTO LEFT EAR EVERY 6 HOURS FOR 7 TO 10 DAYS       No current facility-administered medications on file prior to visit. Reglan [metoclopramide] and Norco [hydrocodone-acetaminophen]  Family History   Problem Relation Age of Onset    Ulcerative Colitis Father     Ulcerative Colitis Sister     Asthma Sister     Heart Disease Maternal Grandfather     Stroke Maternal Grandfather     High Blood Pressure Maternal Grandfather     Arthritis Maternal Grandmother     Vision Loss Maternal Grandmother      Social History     Tobacco Use   Smoking Status Never Smoker   Smokeless Tobacco Never Used       Social History     Substance and Sexual Activity   Alcohol Use Not Currently    Comment: SOCIAL       Review of Systems   Constitutional: Negative for appetite change, chills and fever. Eyes: Negative for redness and visual disturbance. Respiratory: Negative for apnea, cough, shortness of breath and wheezing. Cardiovascular: Negative for chest pain and leg swelling. Gastrointestinal: Negative for abdominal pain, constipation, nausea and vomiting. Genitourinary: Negative for difficulty urinating, dyspareunia, dysuria, enuresis, flank pain, frequency, hematuria, pelvic pain, urgency, vaginal bleeding and vaginal discharge. Musculoskeletal: Negative for back pain, joint swelling and myalgias. Skin: Negative for color change, rash and wound. Neurological: Negative for dizziness, tremors and numbness. Hematological: Negative for adenopathy. Does not bruise/bleed easily. Psychiatric/Behavioral: Negative for sleep disturbance. /72 (Site: Right Upper Arm, Position: Sitting, Cuff Size: Medium Adult)   Temp 98.6 °F (37 °C) (Temporal)   Ht 5' 3\" (1.6 m)   Wt 144 lb (65.3 kg)   BMI 25.51 kg/m²       PHYSICAL EXAM:  Constitutional: Patient resting comfortably, in no acute distress. Neuro: Alert and oriented to person place and time. Psych: Mood and affect normal.  Skin: Warm, dry  HEENT: normocephalic, atraumatic  Lungs: Respiratory effort normal, unlabored  Abdomen: Soft, non-tender, non-distended  : No CVA tenderness. Pelvic: deferred     Lab Results   Component Value Date    BUN 16 07/26/2021     Lab Results   Component Value Date    CREATININE 0.69 07/26/2021       ASSESSMENT:   Diagnosis Orders   1. Kidney stones  XR ABDOMEN (KUB) (SINGLE AP VIEW)       PLAN:  Patient instructed to drink at least 80 ounces of \"good fluids\" daily (water, juice, Gatoraide, milk) and to avoid/minimize intake of \"bad fluids\" (soda pop, coffee, tea, alcohol, energy drinks). Also advised to avoid excessive consumption of sodium and animal protein to decrease risk of recurrent kidney stones. We did offer the option of HLL to go and clean the stones out of her kidneys. At this time she is not interested.     Follow-up in 6 months with KUB

## 2022-02-25 ENCOUNTER — HOSPITAL ENCOUNTER (EMERGENCY)
Age: 32
Discharge: HOME | End: 2022-02-25
Attending: EMERGENCY MEDICINE
Payer: COMMERCIAL

## 2022-02-25 ENCOUNTER — APPOINTMENT (OUTPATIENT)
Dept: CT IMAGING | Age: 32
End: 2022-02-25
Payer: COMMERCIAL

## 2022-02-25 VITALS
OXYGEN SATURATION: 100 % | HEIGHT: 63 IN | WEIGHT: 141 LBS | HEART RATE: 96 BPM | DIASTOLIC BLOOD PRESSURE: 97 MMHG | BODY MASS INDEX: 24.98 KG/M2 | SYSTOLIC BLOOD PRESSURE: 125 MMHG | RESPIRATION RATE: 16 BRPM | TEMPERATURE: 98.4 F

## 2022-02-25 DIAGNOSIS — S39.012A STRAIN OF LUMBAR REGION, INITIAL ENCOUNTER: Primary | ICD-10-CM

## 2022-02-25 LAB
ABSOLUTE EOS #: 0.11 K/UL (ref 0–0.44)
ABSOLUTE IMMATURE GRANULOCYTE: <0.03 K/UL (ref 0–0.3)
ABSOLUTE LYMPH #: 3.78 K/UL (ref 1.1–3.7)
ABSOLUTE MONO #: 0.43 K/UL (ref 0.1–1.2)
ANION GAP SERPL CALCULATED.3IONS-SCNC: 10 MMOL/L (ref 9–17)
BASOPHILS # BLD: 1 % (ref 0–2)
BASOPHILS ABSOLUTE: 0.05 K/UL (ref 0–0.2)
BUN BLDV-MCNC: 16 MG/DL (ref 6–20)
BUN/CREAT BLD: 22 (ref 9–20)
C-REACTIVE PROTEIN: <3 MG/L (ref 0–5)
CALCIUM SERPL-MCNC: 9.3 MG/DL (ref 8.6–10.4)
CHLORIDE BLD-SCNC: 104 MMOL/L (ref 98–107)
CO2: 24 MMOL/L (ref 20–31)
CREAT SERPL-MCNC: 0.72 MG/DL (ref 0.5–0.9)
EOSINOPHILS RELATIVE PERCENT: 1 % (ref 1–4)
GFR AFRICAN AMERICAN: >60 ML/MIN
GFR NON-AFRICAN AMERICAN: >60 ML/MIN
GFR SERPL CREATININE-BSD FRML MDRD: ABNORMAL ML/MIN/{1.73_M2}
GFR SERPL CREATININE-BSD FRML MDRD: ABNORMAL ML/MIN/{1.73_M2}
GLUCOSE BLD-MCNC: 83 MG/DL (ref 70–99)
HCT VFR BLD CALC: 41.3 % (ref 36.3–47.1)
HEMOGLOBIN: 13.8 G/DL (ref 11.9–15.1)
IMMATURE GRANULOCYTES: 0 %
LYMPHOCYTES # BLD: 37 % (ref 24–43)
MCH RBC QN AUTO: 29.7 PG (ref 25.2–33.5)
MCHC RBC AUTO-ENTMCNC: 33.4 G/DL (ref 28.4–34.8)
MCV RBC AUTO: 89 FL (ref 82.6–102.9)
MONOCYTES # BLD: 4 % (ref 3–12)
NRBC AUTOMATED: 0 PER 100 WBC
PDW BLD-RTO: 11.9 % (ref 11.8–14.4)
PLATELET # BLD: 269 K/UL (ref 138–453)
PMV BLD AUTO: 10.3 FL (ref 8.1–13.5)
POTASSIUM SERPL-SCNC: 3.8 MMOL/L (ref 3.7–5.3)
RBC # BLD: 4.64 M/UL (ref 3.95–5.11)
SEDIMENTATION RATE, ERYTHROCYTE: 8 MM (ref 0–20)
SEG NEUTROPHILS: 57 % (ref 36–65)
SEGMENTED NEUTROPHILS ABSOLUTE COUNT: 5.94 K/UL (ref 1.5–8.1)
SODIUM BLD-SCNC: 138 MMOL/L (ref 135–144)
WBC # BLD: 10.3 K/UL (ref 3.5–11.3)

## 2022-02-25 PROCEDURE — 85652 RBC SED RATE AUTOMATED: CPT

## 2022-02-25 PROCEDURE — 86140 C-REACTIVE PROTEIN: CPT

## 2022-02-25 PROCEDURE — 6370000000 HC RX 637 (ALT 250 FOR IP)

## 2022-02-25 PROCEDURE — 36415 COLL VENOUS BLD VENIPUNCTURE: CPT

## 2022-02-25 PROCEDURE — 96375 TX/PRO/DX INJ NEW DRUG ADDON: CPT

## 2022-02-25 PROCEDURE — 6360000002 HC RX W HCPCS: Performed by: EMERGENCY MEDICINE

## 2022-02-25 PROCEDURE — 96374 THER/PROPH/DIAG INJ IV PUSH: CPT

## 2022-02-25 PROCEDURE — 99283 EMERGENCY DEPT VISIT LOW MDM: CPT

## 2022-02-25 PROCEDURE — 72131 CT LUMBAR SPINE W/O DYE: CPT

## 2022-02-25 PROCEDURE — 85025 COMPLETE CBC W/AUTO DIFF WBC: CPT

## 2022-02-25 PROCEDURE — 80048 BASIC METABOLIC PNL TOTAL CA: CPT

## 2022-02-25 RX ORDER — OXYCODONE HYDROCHLORIDE AND ACETAMINOPHEN 5; 325 MG/1; MG/1
1 TABLET ORAL EVERY 6 HOURS PRN
Qty: 12 TABLET | Refills: 0 | Status: SHIPPED | OUTPATIENT
Start: 2022-02-25 | End: 2022-02-28

## 2022-02-25 RX ORDER — METHYLPREDNISOLONE SODIUM SUCCINATE 125 MG/2ML
125 INJECTION, POWDER, LYOPHILIZED, FOR SOLUTION INTRAMUSCULAR; INTRAVENOUS ONCE
Status: COMPLETED | OUTPATIENT
Start: 2022-02-25 | End: 2022-02-25

## 2022-02-25 RX ORDER — CYCLOBENZAPRINE HCL 10 MG
10 TABLET ORAL 3 TIMES DAILY PRN
Qty: 21 TABLET | Refills: 0 | Status: SHIPPED | OUTPATIENT
Start: 2022-02-25 | End: 2022-03-07

## 2022-02-25 RX ORDER — KETOROLAC TROMETHAMINE 15 MG/ML
30 INJECTION, SOLUTION INTRAMUSCULAR; INTRAVENOUS ONCE
Status: COMPLETED | OUTPATIENT
Start: 2022-02-25 | End: 2022-02-25

## 2022-02-25 RX ORDER — ORPHENADRINE CITRATE 30 MG/ML
60 INJECTION INTRAMUSCULAR; INTRAVENOUS ONCE
Status: COMPLETED | OUTPATIENT
Start: 2022-02-25 | End: 2022-02-25

## 2022-02-25 RX ORDER — LORAZEPAM 2 MG/ML
1 INJECTION INTRAMUSCULAR ONCE
Status: COMPLETED | OUTPATIENT
Start: 2022-02-25 | End: 2022-02-25

## 2022-02-25 RX ORDER — OXYCODONE HYDROCHLORIDE AND ACETAMINOPHEN 5; 325 MG/1; MG/1
2 TABLET ORAL ONCE
Status: DISCONTINUED | OUTPATIENT
Start: 2022-02-25 | End: 2022-02-26 | Stop reason: HOSPADM

## 2022-02-25 RX ORDER — MORPHINE SULFATE 4 MG/ML
4 INJECTION, SOLUTION INTRAMUSCULAR; INTRAVENOUS ONCE
Status: COMPLETED | OUTPATIENT
Start: 2022-02-25 | End: 2022-02-25

## 2022-02-25 RX ORDER — ORPHENADRINE CITRATE 30 MG/ML
60 INJECTION INTRAMUSCULAR; INTRAVENOUS ONCE
Status: DISCONTINUED | OUTPATIENT
Start: 2022-02-25 | End: 2022-02-25

## 2022-02-25 RX ORDER — ONDANSETRON 2 MG/ML
4 INJECTION INTRAMUSCULAR; INTRAVENOUS ONCE
Status: COMPLETED | OUTPATIENT
Start: 2022-02-25 | End: 2022-02-25

## 2022-02-25 RX ADMIN — METHYLPREDNISOLONE SODIUM SUCCINATE 125 MG: 125 INJECTION, POWDER, FOR SOLUTION INTRAMUSCULAR; INTRAVENOUS at 22:06

## 2022-02-25 RX ADMIN — ONDANSETRON 4 MG: 2 INJECTION INTRAMUSCULAR; INTRAVENOUS at 20:17

## 2022-02-25 RX ADMIN — KETOROLAC TROMETHAMINE 30 MG: 15 INJECTION, SOLUTION INTRAMUSCULAR; INTRAVENOUS at 20:58

## 2022-02-25 RX ADMIN — MORPHINE SULFATE 4 MG: 4 INJECTION INTRAVENOUS at 20:17

## 2022-02-25 RX ADMIN — ORPHENADRINE CITRATE 60 MG: 60 INJECTION INTRAMUSCULAR; INTRAVENOUS at 20:17

## 2022-02-25 RX ADMIN — LORAZEPAM 1 MG: 2 INJECTION INTRAMUSCULAR; INTRAVENOUS at 20:58

## 2022-02-25 ASSESSMENT — PAIN SCALES - GENERAL
PAINLEVEL_OUTOF10: 7
PAINLEVEL_OUTOF10: 8

## 2022-02-25 ASSESSMENT — PAIN - FUNCTIONAL ASSESSMENT: PAIN_FUNCTIONAL_ASSESSMENT: 0-10

## 2022-02-25 ASSESSMENT — PAIN DESCRIPTION - PAIN TYPE: TYPE: ACUTE PAIN

## 2022-02-25 ASSESSMENT — PAIN DESCRIPTION - LOCATION: LOCATION: BACK

## 2022-02-25 ASSESSMENT — PAIN DESCRIPTION - DESCRIPTORS: DESCRIPTORS: RADIATING

## 2022-02-25 NOTE — Clinical Note
Alexander Peacock was seen and treated in our emergency department on 2/25/2022. She may return to work on 02/27/2022. If you have any questions or concerns, please don't hesitate to call.       George Gentile MD

## 2022-02-26 NOTE — ED PROVIDER NOTES
[address] [place] [address] ED  EMERGENCY DEPARTMENT ENCOUNTER      Pt Name: Mina Richardson  MRN: [id number]  [place] 1990  Date of evaluation: 2/25/2022  Provider: Lela Del Rosario MD    CHIEF COMPLAINT       Chief Complaint   Patient presents with    Back Pain     sudden onset lower back pain x couple hours, radiating into hips         HISTORY OF PRESENT ILLNESS   (Location/Symptom, Timing/Onset, Context/Setting, Quality, Duration, Modifying Factors, Severity)  Note limiting factors. Mina Richardson is a 27 y.o. female who presents to the emergency department     77-year-old patient presents emergency department with severe lower back discomfort. 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 This occurred approximately 4 PM today. There was no history for bowel or bladder incontinence. No radicular component. No perineal numbness. No associated abdominal pain. Patient did relate approximately 1 month prior she had slipped and fallen on ice and at that time had plain x-rays performed which were reportedly normal.          Nursing Notes were reviewed. REVIEW OF SYSTEMS    (2-9 systems for level 4, 10 or more for level 5)     Review of Systems   All other systems reviewed and are negative. Except as noted above the remainder of the review of systems was reviewed and negative.        PAST MEDICAL HISTORY     Past Medical History:   Diagnosis Date    Anemia     GERD (gastroesophageal reflux disease)     Kidney stones     Postpartum depression          SURGICAL HISTORY       Past Surgical History:   Procedure Laterality Date    EXTERNAL EAR SURGERY Left     LITHOTRIPSY Right 08/25/2020    ESWL EXTRACORPOREAL SHOCK WAVE LITHOTRIPSY     LITHOTRIPSY Right 8/25/2020    ESWL EXTRACORPOREAL SHOCK WAVE LITHOTRIPSY performed by Arlene Darby MD at [address] [place] [address] SURGICAL HISTORY Right 5/26/2016    right cysto HLL with stent exchange    TUBAL LIGATION N/A 7/21/2021    TUBAL LIGATION performed by Shavon Davila MD at [facility]      LABIA HEMATOMA         CURRENT MEDICATIONS       Discharge Medication List as of 2/25/2022 10:25 PM      CONTINUE these medications which have NOT CHANGED    Details   ibuprofen (ADVIL;MOTRIN) 800 MG tablet Take 1 tablet by mouth every 8 hours, Disp-120 tablet, R-3Normal      neomycin-polymyxin-hydrocortisone 1 % SOLN otic solution INSTILL 4 DROPS INTO LEFT EAR EVERY 6 HOURS FOR 7 TO 10 DAYSHistorical Med      omeprazole (PRILOSEC) 10 MG delayed release capsule Take 10 mg by mouth dailyHistorical Med             ALLERGIES     Reglan [metoclopramide] and Norco [hydrocodone-acetaminophen]    FAMILY HISTORY       Family History   Problem Relation Age of Onset    Ulcerative Colitis Father     Ulcerative Colitis Sister     Asthma Sister     Heart Disease Maternal Grandfather     Stroke Maternal Grandfather     High Blood Pressure Maternal Grandfather     Arthritis Maternal Grandmother     Vision Loss Maternal Grandmother           SOCIAL HISTORY       Social History     Socioeconomic History    Marital status:      Spouse name: None    Number of children: None    Years of education: None    Highest education level: None   Occupational History    None   Tobacco Use    Smoking status: Never Smoker    Smokeless tobacco: Never Used   Vaping Use    Vaping Use: Never used   Substance and Sexual Activity    Alcohol use: Not Currently     Comment: SOCIAL    Drug use: No    Sexual activity: Yes     Partners: Male   Other Topics Concern    None   Social History Narrative    None     Social Determinants of Health     Financial Resource Strain:     Difficulty of Paying Living Expenses: Not on file   Food Insecurity:     Worried About Running Out of Food in the Last Year: Not on file    [address] in the Last Year: Not on file   Transportation Needs:     Lack of Transportation (Medical): Not on file    Lack of Transportation (Non-Medical): Not on file   Physical Activity:     Days of Exercise per Week: Not on file    Minutes of Exercise per Session: Not on file   Stress:     Feeling of Stress : Not on file   Social Connections:     Frequency of Communication with Friends and Family: Not on file    Frequency of Social Gatherings with Friends and Family: Not on file    Attends Protestant Services: Not on file    Active Member of [address] [place] [address] [email or web address] or Organizations: Not on file    Attends Club or Organization Meetings: Not on file    Marital Status: Not on file   Intimate Partner Violence:     Fear of Current or Ex-Partner: Not on file    Emotionally Abused: Not on file    Physically Abused: Not on file    Sexually Abused: Not on file   Housing Stability:     Unable to Pay for Housing in the Last Year: Not on file    Number of [place] in the Last Year: Not on file    Unstable Housing in the Last Year: Not on file       SCREENINGS        [place] Coma Scale  Eye Opening: Spontaneous  Best Verbal Response: Oriented  Best Motor Response: Obeys commands  [place] Coma Scale Score: 15               PHYSICAL EXAM    (up to 7 for level 4, 8 or more for level 5)     ED Triage Vitals [02/25/22 1947]   BP Temp Temp Source Pulse Resp SpO2 Height Weight   (!) 125/97 98.4 °F (36.9 °C) Tympanic 96 16 100 % 5' 3\" (1.6 m) 141 lb (64 kg)       Physical Exam  Vitals and nursing note reviewed. Constitutional:       General: She is not in acute distress. Appearance: She is not ill-appearing, toxic-appearing or diaphoretic. Comments: Patient appears to be uncomfortable   HENT:      Head: Normocephalic. Nose: Nose normal.      Mouth/Throat:      Mouth: Mucous membranes are moist.   Eyes:      Extraocular Movements: Extraocular movements intact.       Pupils: Pupils are equal, round, and reactive to light. Neck:      Vascular: No carotid bruit. Cardiovascular:      Rate and Rhythm: Normal rate and regular rhythm. Pulses: Normal pulses. Heart sounds: Normal heart sounds. Pulmonary:      Effort: Pulmonary effort is normal.      Breath sounds: Normal breath sounds. No wheezing or rhonchi. Abdominal:      General: Abdomen is flat. There is no distension. Palpations: Abdomen is soft. Musculoskeletal:         General: Normal range of motion. Cervical back: Normal range of motion. No rigidity. Right lower leg: No edema. Left lower leg: No edema. Comments: Tenderness midline but the lumbar spine L3-L4-L5-no step-off defect patterns are appreciated or palpated    Musculoskeletal spasm noted paralumbar   Skin:     General: Skin is warm and dry. Capillary Refill: Capillary refill takes less than 2 seconds. Findings: No lesion or rash. Neurological:      General: No focal deficit present. Mental Status: She is alert and oriented to person, place, and time. Mental status is at baseline. Cranial Nerves: No cranial nerve deficit. Sensory: No sensory deficit. Motor: No weakness. Coordination: Coordination normal.      Comments: Pedal push pull adequate 5 out of 5 lower extremities         DIAGNOSTIC RESULTS     EKG: All EKG's are interpreted by the Emergency Department Physician who either signs or Co-signs this chart in the absence of a cardiologist.      RADIOLOGY:   Non-plain film images such as CT, Ultrasound and MRI are read by the radiologist. Plain radiographic images are visualized and preliminarily interpreted by the emergency physician with the below findings:      Interpretation per the Radiologist below, if available at the time of this note:    CT LUMBAR SPINE WO CONTRAST   Final Result   No acute bony abnormality of the lumbar spine.       Minimal levoconvex curvature which may be positional, though correlate for contributory muscle spasm. Mild degenerative disc disease L4 through S1 with only minimal spinal canal   and lateral recess stenosis. ED BEDSIDE ULTRASOUND:   Performed by ED Physician - none    LABS:  Labs Reviewed   CBC WITH AUTO DIFFERENTIAL - Abnormal; Notable for the following components:       Result Value    Absolute Lymph # 3.78 (*)     All other components within normal limits   BASIC METABOLIC PANEL W/ REFLEX TO MG FOR LOW K - Abnormal; Notable for the following components:    Bun/Cre Ratio 22 (*)     All other components within normal limits   C-REACTIVE PROTEIN   SEDIMENTATION RATE       All other labs were within normal range or not returned as of this dictation.     EMERGENCY DEPARTMENT COURSE and DIFFERENTIAL DIAGNOSIS/MDM:   Vitals:    Vitals:    02/25/22 1947   BP: (!) 125/97   Pulse: 96   Resp: 16   Temp: 98.4 °F (36.9 °C)   TempSrc: Tympanic   SpO2: 100%   Weight: 141 lb (64 kg)   Height: 5' 3\" (1.6 m)         MDM  Number of Diagnoses or Management Options  Strain of lumbar region, initial encounter  Diagnosis management comments: 76-year-old female presents emergency department with concerns of excruciating lower back pain as document HPI    Laboratory studies imaging studies have been reviewed and discussed with the patient    Diagnostic consideration-acute lumbar strain, acute herniated disc    No red flags    Patient knowledges discharge diagnosis the importance of timely follow-up        REASSESSMENT   Patient did feel better slight relief discomfort after receiving medications as documented    ED Course as of 02/26/22 0401   Fri Feb 25, 2022   2221 CT LUMBAR SPINE WO CONTRAST  CT lumbar spine no acute processes radiologist read [RS]   Sat Feb 26, 2022   0352 C-Reactive Protein:    CRP <3.0 [RS]   0352 Sedimentation Rate:    Sed Rate 8 [RS]   0352 CBC with Auto Differential(!):    WBC 10.3   RBC 4.64   Hemoglobin Quant 13.8   Hematocrit 41.3   MCV 89.0   MCH 29.7   MCHC 33.4   RDW 11.9   Platelet Count 223   MPV 10.3   NRBC Automated 0.0   Seg Neutrophils 57   Lymphocytes 37   Monocytes 4   Eosinophils % 1   Basophils 1   Immature Granulocytes 0   Segs Absolute 5.94   Absolute Lymph # 3.78(!)   Absolute Mono # 0.43   Absolute Eos # 0.11   Basophils Absolute 0.05   Absolute Immature Granulocyte <0.03 [RS]   5640 Basic Metabolic Panel w/ Reflex to MG(!):    GLUCOSE, FASTING,GF 83   BUN,BUNPL 16   Creatinine 0.72   Bun/Cre Ratio 22(!)   CALCIUM, SERUM, 150440 9.3   Sodium 138   Potassium 3.8   Chloride 104   CO2 24   Anion Gap 10   GFR Non- >60   GFR  >60   GFR Comment        GFR Staging      [RS]      ED Course User Index  [RS] Lela Del Rosario MD         CRITICAL CARE TIME   Total Critical Care time was minutes, excluding separately reportable procedures. There was a high probability of clinically significant/life threatening deterioration in the patient's condition which required my urgent intervention. CONSULTS:  None    PROCEDURES:  Unless otherwise noted below, none     Procedures    FINAL IMPRESSION      1. Strain of lumbar region, initial encounter          DISPOSITION/PLAN   DISPOSITION Decision To Discharge 02/25/2022 10:21:28 PM      PATIENT REFERRED TO:  FÁTIMA Haro CNP  [address]  [phone]    Call in 2 days        DISCHARGE MEDICATIONS:  Discharge Medication List as of 2/25/2022 10:25 PM      START taking these medications    Details   oxyCODONE-acetaminophen (PERCOCET) 5-325 MG per tablet Take 1 tablet by mouth every 6 hours as needed for Pain for up to 3 days. Intended supply: 3 days. Take lowest dose possible to manage pain, Disp-12 tablet, R-0Print      cyclobenzaprine (FLEXERIL) 10 MG tablet Take 1 tablet by mouth 3 times daily as needed for Muscle spasms, Disp-21 tablet, R-0Normal           Controlled Substances Monitoring:     No flowsheet data found.     (Please note that portions of

## 2023-01-08 ENCOUNTER — HOSPITAL ENCOUNTER (EMERGENCY)
Age: 33
Discharge: HOME | End: 2023-01-08
Attending: EMERGENCY MEDICINE
Payer: COMMERCIAL

## 2023-01-08 VITALS
HEART RATE: 91 BPM | RESPIRATION RATE: 18 BRPM | SYSTOLIC BLOOD PRESSURE: 122 MMHG | DIASTOLIC BLOOD PRESSURE: 83 MMHG | OXYGEN SATURATION: 99 % | TEMPERATURE: 98.1 F

## 2023-01-08 DIAGNOSIS — S13.9XXA ACUTE SPRAIN OF LIGAMENT OF NECK, INITIAL ENCOUNTER: Primary | ICD-10-CM

## 2023-01-08 DIAGNOSIS — G44.209 TENSION HEADACHE: ICD-10-CM

## 2023-01-08 PROCEDURE — 6370000000 HC RX 637 (ALT 250 FOR IP): Performed by: EMERGENCY MEDICINE

## 2023-01-08 PROCEDURE — 99283 EMERGENCY DEPT VISIT LOW MDM: CPT | Performed by: EMERGENCY MEDICINE

## 2023-01-08 RX ORDER — CYCLOBENZAPRINE HCL 10 MG
10 TABLET ORAL 3 TIMES DAILY PRN
Qty: 21 TABLET | Refills: 0 | Status: SHIPPED | OUTPATIENT
Start: 2023-01-08 | End: 2023-01-15

## 2023-01-08 RX ORDER — CYCLOBENZAPRINE HCL 10 MG
10 TABLET ORAL ONCE
Status: COMPLETED | OUTPATIENT
Start: 2023-01-08 | End: 2023-01-08

## 2023-01-08 RX ORDER — BUTALBITAL, ACETAMINOPHEN AND CAFFEINE 50; 325; 40 MG/1; MG/1; MG/1
1 TABLET ORAL EVERY 4 HOURS PRN
Status: DISCONTINUED | OUTPATIENT
Start: 2023-01-08 | End: 2023-01-08 | Stop reason: HOSPADM

## 2023-01-08 RX ADMIN — CYCLOBENZAPRINE 10 MG: 10 TABLET, FILM COATED ORAL at 10:06

## 2023-01-08 RX ADMIN — BUTALBITAL, ACETAMINOPHEN, AND CAFFEINE 1 TABLET: 50; 325; 40 TABLET ORAL at 10:06

## 2023-01-08 ASSESSMENT — PAIN SCALES - GENERAL
PAINLEVEL_OUTOF10: 3
PAINLEVEL_OUTOF10: 8
PAINLEVEL_OUTOF10: 8

## 2023-01-08 ASSESSMENT — PAIN DESCRIPTION - LOCATION: LOCATION: HEAD;NECK

## 2023-01-08 ASSESSMENT — PAIN - FUNCTIONAL ASSESSMENT
PAIN_FUNCTIONAL_ASSESSMENT: 0-10
PAIN_FUNCTIONAL_ASSESSMENT: 0-10

## 2023-01-08 ASSESSMENT — ENCOUNTER SYMPTOMS
SORE THROAT: 0
BACK PAIN: 0
ABDOMINAL DISTENTION: 0
SHORTNESS OF BREATH: 0

## 2023-01-08 NOTE — DISCHARGE INSTRUCTIONS
Tylenol for pain as needed. Take the muscle relaxers as prescribed up to 3 times every day. The muscle relaxer can make you sleepy so do not operate heavy machinery or drive while you are taking the medication. Continue with heating pad and massaging the back of the neck. Follow-up with your doctor next week if symptoms do not improve.

## 2023-01-08 NOTE — ED PROVIDER NOTES
[facility] ED  EMERGENCY DEPARTMENT ENCOUNTER      Pt Name: Bryce Ch  MRN: [id number]  [place] 1990  Date of evaluation: 1/8/2023  Provider: Dianna Cadet DO    CHIEF COMPLAINT       Chief Complaint   Patient presents with    Headache    Torticollis     Started a few days ago. Not relieved by tylenol or heat at home. Patient is 10 weeks pregnant         HISTORY OF PRESENT ILLNESS   (Location/Symptom, Timing/Onset, Context/Setting, Quality, Duration, Modifying Factors, Severity)  Note limiting factors. Bryce Ch is a 34 y.o. female who presents to the emergency department complains of headache exacerbated by her neck pain. Patient states that about a week ago she woke up and had a stiff neck on the right side and she thought it was getting better with a heating pad and Tylenol but 2 days ago it seemed to have gotten a lot worse. Patient has tried heating pad, Tylenol and has also started massaging the area without much relief. She states that every time she tries to move her neck the pain gets worse. She denies any injuries to her neck. She is also 10 weeks pregnant so she cannot take ibuprofen. Her last dose of Tylenol was last night. She states that the headache starts at the back of her neck and radiates up her occipital region. REVIEW OF SYSTEMS    (2-9 systems for level 4, 10 or more for level 5)     Review of Systems   Constitutional:  Negative for fatigue and fever. HENT:  Negative for congestion and sore throat. Eyes:  Negative for visual disturbance. Respiratory:  Negative for shortness of breath. Cardiovascular:  Negative for chest pain and palpitations. Gastrointestinal:  Negative for abdominal distention. Genitourinary:  Negative for dysuria. Musculoskeletal:  Positive for neck pain. Negative for back pain. Skin:  Negative for rash. Neurological:  Positive for headaches. Psychiatric/Behavioral:  Negative for suicidal ideas.       Except as noted above the remainder of the review of systems was reviewed and negative.        PAST MEDICAL HISTORY     Past Medical History:   Diagnosis Date    Anemia     GERD (gastroesophageal reflux disease)     Kidney stones     Postpartum depression          SURGICAL HISTORY       Past Surgical History:   Procedure Laterality Date    EXTERNAL EAR SURGERY Left     LITHOTRIPSY Right 08/25/2020    ESWL EXTRACORPOREAL SHOCK WAVE LITHOTRIPSY     LITHOTRIPSY Right 8/25/2020    ESWL EXTRACORPOREAL SHOCK WAVE LITHOTRIPSY performed by Sandra Snowden MD at [address]  [facility] Right 5/26/2016    right cysto HLL with stent exchange    TUBAL LIGATION N/A 7/21/2021    TUBAL LIGATION performed by Stefan Martinez MD at [facility]      LABIA HEMATOMA         CURRENT MEDICATIONS       Previous Medications    IBUPROFEN (ADVIL;MOTRIN) 800 MG TABLET    Take 1 tablet by mouth every 8 hours    NEOMYCIN-POLYMYXIN-HYDROCORTISONE 1 % SOLN OTIC SOLUTION    INSTILL 4 DROPS INTO LEFT EAR EVERY 6 HOURS FOR 7 TO 10 DAYS    OMEPRAZOLE (PRILOSEC) 10 MG DELAYED RELEASE CAPSULE    Take 10 mg by mouth daily       ALLERGIES     Reglan [metoclopramide] and Norco [hydrocodone-acetaminophen]    FAMILY HISTORY       Family History   Problem Relation Age of Onset    Ulcerative Colitis Father     Ulcerative Colitis Sister     Asthma Sister     Heart Disease Maternal Grandfather     Stroke Maternal Grandfather     High Blood Pressure Maternal Grandfather     Arthritis Maternal Grandmother     Vision Loss Maternal Grandmother           SOCIAL HISTORY       Social History     Socioeconomic History    Marital status:    Tobacco Use    Smoking status: Never    Smokeless tobacco: Never   Vaping Use    Vaping Use: Never used   Substance and Sexual Activity    Alcohol use: Not Currently     Comment: SOCIAL    Drug use: No    Sexual activity: Yes     Partners: Male       SCREENINGS [place] Coma Scale  Eye Opening: Spontaneous  Best Verbal Response: Oriented  Best Motor Response: Obeys commands  [place] Coma Scale Score: 15               PHYSICAL EXAM    (up to 7 for level 4, 8 or more for level 5)     ED Triage Vitals [01/08/23 0940]   BP Temp Temp Source Heart Rate Resp SpO2 Height Weight   122/83 98.1 °F (36.7 °C) Tympanic 91 18 99 % -- --       Physical Exam  Constitutional:       General: She is not in acute distress. Appearance: Normal appearance. She is not toxic-appearing. HENT:      Head: Normocephalic and atraumatic. Mouth/Throat:      Mouth: Mucous membranes are moist.   Eyes:      Extraocular Movements: Extraocular movements intact. Pupils: Pupils are equal, round, and reactive to light. Neck:      Comments: No step-offs. No swelling appreciated around the neck. Cardiovascular:      Rate and Rhythm: Normal rate and regular rhythm. Pulses: Normal pulses. Heart sounds: Normal heart sounds. Pulmonary:      Effort: Pulmonary effort is normal.      Breath sounds: Normal breath sounds. Abdominal:      General: Abdomen is flat. Bowel sounds are normal.      Palpations: Abdomen is soft. Musculoskeletal:         General: Normal range of motion. Cervical back: Tenderness (Mild tenderness to palpation of the right side of the neck; pain is worse with side bending and side rotation.) present. No rigidity. Skin:     General: Skin is warm and dry. Capillary Refill: Capillary refill takes less than 2 seconds. Neurological:      General: No focal deficit present. Mental Status: She is alert and oriented to person, place, and time.    Psychiatric:         Mood and Affect: Mood normal.       DIAGNOSTIC RESULTS     EKG: All EKG's are interpreted by the Emergency Department Physician who either signs or Co-signs this chart in the absence of a cardiologist.      RADIOLOGY:   Non-plain film images such as CT, Ultrasound and MRI are read by the radiologist. Sol Cornelius radiographic images are visualized and preliminarily interpreted by the emergency physician with the below findings:      Interpretation per the Radiologist below, if available at the time of this note:    No orders to display         ED BEDSIDE ULTRASOUND:   Performed by ED Physician - none    LABS:  Labs Reviewed - No data to display    All other labs were within normal range or not returned as of this dictation. EMERGENCY DEPARTMENT COURSE and DIFFERENTIAL DIAGNOSIS/MDM:   Vitals:    Vitals:    01/08/23 0940   BP: 122/83   Pulse: 91   Resp: 18   Temp: 98.1 °F (36.7 °C)   TempSrc: Tympanic   SpO2: 99%         MDM     Amount and/or Complexity of Data Reviewed  Clinical lab tests: ordered and reviewed  Tests in the radiology section of CPT®: ordered and reviewed  Tests in the medicine section of CPT®: ordered and reviewed  Review and summarize past medical records: yes    Risk of Complications, Morbidity, and/or Mortality  Presenting problems: low  Diagnostic procedures: low  Management options: low    Patient Progress  Patient progress: stable        REASSESSMENT      Patient is feeling much better at this time. She is able to move her neck more than what she was able to previously. I will give her a prescription for Flexeril. Advised her to not operate heavy machinery while she is taking the medication as it will make her sleepy. Continue with Tylenol as needed for the headache. She likely has tension headache related to the neck sprain. Follow-up with her doctor next week if symptoms not improved. Otherwise continue with heating pad and or ice pack. Massaging will also help. FINAL IMPRESSION      1. Acute sprain of ligament of neck, initial encounter    2.  Tension headache          DISPOSITION/PLAN   DISPOSITION Decision To Discharge 01/08/2023 11:45:10 AM      PATIENT REFERRED TO:  FÁTIMA Hairston - CNP  [address]  [phone]    In 1 week      DISCHARGE MEDICATIONS:  New Prescriptions    CYCLOBENZAPRINE (FLEXERIL) 10 MG TABLET    Take 1 tablet by mouth 3 times daily as needed for Muscle spasms     Controlled Substances Monitoring:     No flowsheet data found.     (Please note that portions of this note were completed with a voice recognition program.  Efforts were made to edit the dictations but occasionally words are mis-transcribed.)    Billy Leonard DO (electronically signed)  Attending Emergency Physician            Billy Leonard DO  01/08/23 6528

## 2023-03-07 ENCOUNTER — APPOINTMENT (OUTPATIENT)
Dept: GENERAL RADIOLOGY | Age: 33
End: 2023-03-07
Payer: COMMERCIAL

## 2023-03-07 ENCOUNTER — APPOINTMENT (OUTPATIENT)
Dept: CT IMAGING | Age: 33
End: 2023-03-07
Payer: COMMERCIAL

## 2023-03-07 ENCOUNTER — HOSPITAL ENCOUNTER (OUTPATIENT)
Age: 33
Setting detail: OBSERVATION
Discharge: HOME | End: 2023-03-08
Attending: EMERGENCY MEDICINE | Admitting: INTERNAL MEDICINE
Payer: COMMERCIAL

## 2023-03-07 DIAGNOSIS — I26.99 BILATERAL PULMONARY EMBOLISM (HCC): Primary | ICD-10-CM

## 2023-03-07 DIAGNOSIS — R00.0 TACHYCARDIA: ICD-10-CM

## 2023-03-07 DIAGNOSIS — R06.02 SHORTNESS OF BREATH: ICD-10-CM

## 2023-03-07 LAB
ABSOLUTE EOS #: 0.11 K/UL (ref 0–0.44)
ABSOLUTE IMMATURE GRANULOCYTE: 0.04 K/UL (ref 0–0.3)
ABSOLUTE LYMPH #: 1.56 K/UL (ref 1.1–3.7)
ABSOLUTE MONO #: 0.31 K/UL (ref 0.1–1.2)
ANION GAP SERPL CALCULATED.3IONS-SCNC: 12 MMOL/L (ref 9–17)
BASOPHILS # BLD: 0 % (ref 0–2)
BASOPHILS ABSOLUTE: <0.03 K/UL (ref 0–0.2)
BNP SERPL-MCNC: <36 PG/ML
BUN SERPL-MCNC: 12 MG/DL (ref 6–20)
BUN/CREAT BLD: 20 (ref 9–20)
CALCIUM SERPL-MCNC: 8.5 MG/DL (ref 8.6–10.4)
CHLORIDE SERPL-SCNC: 104 MMOL/L (ref 98–107)
CO2 SERPL-SCNC: 24 MMOL/L (ref 20–31)
CREAT SERPL-MCNC: 0.6 MG/DL (ref 0.5–0.9)
EOSINOPHILS RELATIVE PERCENT: 1 % (ref 1–4)
GFR SERPL CREATININE-BSD FRML MDRD: >60 ML/MIN/1.73M2
GLUCOSE SERPL-MCNC: 124 MG/DL (ref 70–99)
HCT VFR BLD AUTO: 32.1 % (ref 36.3–47.1)
HGB BLD-MCNC: 11 G/DL (ref 11.9–15.1)
IMMATURE GRANULOCYTES: 1 %
LYMPHOCYTES # BLD: 18 % (ref 24–43)
MCH RBC QN AUTO: 31.3 PG (ref 25.2–33.5)
MCHC RBC AUTO-ENTMCNC: 34.3 G/DL (ref 28.4–34.8)
MCV RBC AUTO: 91.5 FL (ref 82.6–102.9)
MONOCYTES # BLD: 4 % (ref 3–12)
NRBC AUTOMATED: 0 PER 100 WBC
PDW BLD-RTO: 12.6 % (ref 11.8–14.4)
PLATELET # BLD AUTO: 330 K/UL (ref 138–453)
PMV BLD AUTO: 10.4 FL (ref 8.1–13.5)
POTASSIUM SERPL-SCNC: 3.6 MMOL/L (ref 3.7–5.3)
RBC # BLD: 3.51 M/UL (ref 3.95–5.11)
SEG NEUTROPHILS: 76 % (ref 36–65)
SEGMENTED NEUTROPHILS ABSOLUTE COUNT: 6.59 K/UL (ref 1.5–8.1)
SODIUM SERPL-SCNC: 140 MMOL/L (ref 135–144)
TROPONIN I SERPL DL<=0.01 NG/ML-MCNC: <6 NG/L (ref 0–14)
WBC # BLD AUTO: 8.6 K/UL (ref 3.5–11.3)

## 2023-03-07 PROCEDURE — 71045 X-RAY EXAM CHEST 1 VIEW: CPT

## 2023-03-07 PROCEDURE — 96374 THER/PROPH/DIAG INJ IV PUSH: CPT

## 2023-03-07 PROCEDURE — 71260 CT THORAX DX C+: CPT | Performed by: EMERGENCY MEDICINE

## 2023-03-07 PROCEDURE — 6360000002 HC RX W HCPCS: Performed by: EMERGENCY MEDICINE

## 2023-03-07 PROCEDURE — 80048 BASIC METABOLIC PNL TOTAL CA: CPT

## 2023-03-07 PROCEDURE — 93005 ELECTROCARDIOGRAM TRACING: CPT | Performed by: EMERGENCY MEDICINE

## 2023-03-07 PROCEDURE — 83880 ASSAY OF NATRIURETIC PEPTIDE: CPT

## 2023-03-07 PROCEDURE — 96361 HYDRATE IV INFUSION ADD-ON: CPT

## 2023-03-07 PROCEDURE — 84484 ASSAY OF TROPONIN QUANT: CPT

## 2023-03-07 PROCEDURE — 6360000004 HC RX CONTRAST MEDICATION: Performed by: EMERGENCY MEDICINE

## 2023-03-07 PROCEDURE — 99285 EMERGENCY DEPT VISIT HI MDM: CPT

## 2023-03-07 PROCEDURE — 96375 TX/PRO/DX INJ NEW DRUG ADDON: CPT

## 2023-03-07 PROCEDURE — 6370000000 HC RX 637 (ALT 250 FOR IP): Performed by: EMERGENCY MEDICINE

## 2023-03-07 PROCEDURE — 85025 COMPLETE CBC W/AUTO DIFF WBC: CPT

## 2023-03-07 PROCEDURE — 2580000003 HC RX 258: Performed by: EMERGENCY MEDICINE

## 2023-03-07 PROCEDURE — 36415 COLL VENOUS BLD VENIPUNCTURE: CPT

## 2023-03-07 PROCEDURE — 70450 CT HEAD/BRAIN W/O DYE: CPT

## 2023-03-07 RX ORDER — MORPHINE SULFATE 4 MG/ML
4 INJECTION, SOLUTION INTRAMUSCULAR; INTRAVENOUS ONCE
Status: COMPLETED | OUTPATIENT
Start: 2023-03-07 | End: 2023-03-07

## 2023-03-07 RX ORDER — KETOROLAC TROMETHAMINE 30 MG/ML
30 INJECTION, SOLUTION INTRAMUSCULAR; INTRAVENOUS ONCE
Status: COMPLETED | OUTPATIENT
Start: 2023-03-07 | End: 2023-03-07

## 2023-03-07 RX ORDER — ONDANSETRON 2 MG/ML
4 INJECTION INTRAMUSCULAR; INTRAVENOUS ONCE
Status: COMPLETED | OUTPATIENT
Start: 2023-03-07 | End: 2023-03-07

## 2023-03-07 RX ORDER — 0.9 % SODIUM CHLORIDE 0.9 %
1000 INTRAVENOUS SOLUTION INTRAVENOUS ONCE
Status: COMPLETED | OUTPATIENT
Start: 2023-03-07 | End: 2023-03-07

## 2023-03-07 RX ADMIN — SODIUM CHLORIDE 1000 ML: 9 INJECTION, SOLUTION INTRAVENOUS at 21:55

## 2023-03-07 RX ADMIN — KETOROLAC TROMETHAMINE 30 MG: 30 INJECTION, SOLUTION INTRAMUSCULAR; INTRAVENOUS at 21:55

## 2023-03-07 RX ADMIN — MORPHINE SULFATE 4 MG: 4 INJECTION, SOLUTION INTRAMUSCULAR; INTRAVENOUS at 22:57

## 2023-03-07 RX ADMIN — IOPAMIDOL 75 ML: 755 INJECTION, SOLUTION INTRAVENOUS at 22:10

## 2023-03-07 RX ADMIN — ONDANSETRON 4 MG: 2 INJECTION INTRAMUSCULAR; INTRAVENOUS at 22:56

## 2023-03-07 RX ADMIN — APIXABAN 10 MG: 5 TABLET, FILM COATED ORAL at 23:38

## 2023-03-07 ASSESSMENT — PAIN SCALES - GENERAL
PAINLEVEL_OUTOF10: 7
PAINLEVEL_OUTOF10: 7
PAINLEVEL_OUTOF10: 4
PAINLEVEL_OUTOF10: 5

## 2023-03-07 ASSESSMENT — PAIN DESCRIPTION - LOCATION: LOCATION: ABDOMEN

## 2023-03-07 ASSESSMENT — ENCOUNTER SYMPTOMS
SHORTNESS OF BREATH: 1
SORE THROAT: 0
ABDOMINAL DISTENTION: 0
BACK PAIN: 0

## 2023-03-07 ASSESSMENT — PAIN - FUNCTIONAL ASSESSMENT
PAIN_FUNCTIONAL_ASSESSMENT: 0-10

## 2023-03-08 ENCOUNTER — APPOINTMENT (OUTPATIENT)
Dept: VASCULAR LAB | Age: 33
End: 2023-03-08
Payer: COMMERCIAL

## 2023-03-08 VITALS
SYSTOLIC BLOOD PRESSURE: 112 MMHG | WEIGHT: 147.4 LBS | HEIGHT: 63 IN | TEMPERATURE: 97 F | HEART RATE: 86 BPM | BODY MASS INDEX: 26.12 KG/M2 | OXYGEN SATURATION: 98 % | DIASTOLIC BLOOD PRESSURE: 77 MMHG | RESPIRATION RATE: 18 BRPM

## 2023-03-08 PROBLEM — O03.7: Status: ACTIVE | Noted: 2023-03-08

## 2023-03-08 LAB
BACTERIA: ABNORMAL
BILIRUBIN URINE: NEGATIVE
COLOR: YELLOW
EKG ATRIAL RATE: 115 BPM
EKG P AXIS: 47 DEGREES
EKG P-R INTERVAL: 128 MS
EKG Q-T INTERVAL: 318 MS
EKG QRS DURATION: 78 MS
EKG QTC CALCULATION (BAZETT): 439 MS
EKG R AXIS: 35 DEGREES
EKG T AXIS: 27 DEGREES
EKG VENTRICULAR RATE: 115 BPM
EPITHELIAL CELLS UA: ABNORMAL /HPF (ref 0–25)
GLUCOSE UR STRIP.AUTO-MCNC: NEGATIVE MG/DL
KETONES UR STRIP.AUTO-MCNC: NEGATIVE MG/DL
LEUKOCYTE ESTERASE UR QL STRIP.AUTO: NEGATIVE
NITRITE UR QL STRIP.AUTO: NEGATIVE
PROT UR STRIP.AUTO-MCNC: 6.5 MG/DL (ref 5–9)
PROT UR STRIP.AUTO-MCNC: NEGATIVE MG/DL
RBC CLUMPS #/AREA URNS AUTO: ABNORMAL /HPF (ref 0–2)
SPECIFIC GRAVITY UA: <1.005 (ref 1.01–1.02)
TURBIDITY: CLEAR
URINE HGB: ABNORMAL
UROBILINOGEN, URINE: NORMAL
WBC UA: ABNORMAL /HPF (ref 0–5)

## 2023-03-08 PROCEDURE — G0378 HOSPITAL OBSERVATION PER HR: HCPCS

## 2023-03-08 PROCEDURE — 93970 EXTREMITY STUDY: CPT

## 2023-03-08 PROCEDURE — 6370000000 HC RX 637 (ALT 250 FOR IP): Performed by: NURSE PRACTITIONER

## 2023-03-08 PROCEDURE — 93010 ELECTROCARDIOGRAM REPORT: CPT | Performed by: INTERNAL MEDICINE

## 2023-03-08 PROCEDURE — 2580000003 HC RX 258: Performed by: INTERNAL MEDICINE

## 2023-03-08 PROCEDURE — 96361 HYDRATE IV INFUSION ADD-ON: CPT

## 2023-03-08 PROCEDURE — 6370000000 HC RX 637 (ALT 250 FOR IP): Performed by: INTERNAL MEDICINE

## 2023-03-08 PROCEDURE — 81001 URINALYSIS AUTO W/SCOPE: CPT

## 2023-03-08 PROCEDURE — 94761 N-INVAS EAR/PLS OXIMETRY MLT: CPT

## 2023-03-08 RX ORDER — OXYCODONE HYDROCHLORIDE AND ACETAMINOPHEN 5; 325 MG/1; MG/1
1 TABLET ORAL EVERY 4 HOURS PRN
Status: DISCONTINUED | OUTPATIENT
Start: 2023-03-08 | End: 2023-03-08 | Stop reason: HOSPADM

## 2023-03-08 RX ORDER — TIZANIDINE 2 MG/1
2 TABLET ORAL 2 TIMES DAILY PRN
COMMUNITY

## 2023-03-08 RX ORDER — ONDANSETRON 2 MG/ML
4 INJECTION INTRAMUSCULAR; INTRAVENOUS EVERY 6 HOURS PRN
Status: DISCONTINUED | OUTPATIENT
Start: 2023-03-08 | End: 2023-03-08 | Stop reason: HOSPADM

## 2023-03-08 RX ORDER — ACETAMINOPHEN 650 MG/1
650 SUPPOSITORY RECTAL EVERY 6 HOURS PRN
Status: DISCONTINUED | OUTPATIENT
Start: 2023-03-08 | End: 2023-03-08 | Stop reason: HOSPADM

## 2023-03-08 RX ORDER — DOCUSATE SODIUM 100 MG/1
100 CAPSULE, LIQUID FILLED ORAL DAILY PRN
COMMUNITY
Start: 2023-02-28

## 2023-03-08 RX ORDER — SODIUM CHLORIDE 0.9 % (FLUSH) 0.9 %
10 SYRINGE (ML) INJECTION PRN
Status: DISCONTINUED | OUTPATIENT
Start: 2023-03-08 | End: 2023-03-08 | Stop reason: HOSPADM

## 2023-03-08 RX ORDER — SODIUM CHLORIDE 9 MG/ML
INJECTION, SOLUTION INTRAVENOUS CONTINUOUS
Status: DISCONTINUED | OUTPATIENT
Start: 2023-03-08 | End: 2023-03-08

## 2023-03-08 RX ORDER — LANOLIN ALCOHOL/MO/W.PET/CERES
325 CREAM (GRAM) TOPICAL DAILY
COMMUNITY
Start: 2023-03-01

## 2023-03-08 RX ORDER — SODIUM CHLORIDE 9 MG/ML
INJECTION, SOLUTION INTRAVENOUS PRN
Status: DISCONTINUED | OUTPATIENT
Start: 2023-03-08 | End: 2023-03-08 | Stop reason: HOSPADM

## 2023-03-08 RX ORDER — ACETAMINOPHEN 325 MG/1
650 TABLET ORAL EVERY 6 HOURS PRN
Status: DISCONTINUED | OUTPATIENT
Start: 2023-03-08 | End: 2023-03-08 | Stop reason: HOSPADM

## 2023-03-08 RX ORDER — OXYCODONE HYDROCHLORIDE AND ACETAMINOPHEN 5; 325 MG/1; MG/1
1 TABLET ORAL EVERY 4 HOURS PRN
Qty: 18 TABLET | Refills: 0 | Status: SHIPPED | OUTPATIENT
Start: 2023-03-08 | End: 2023-03-11

## 2023-03-08 RX ORDER — PANTOPRAZOLE SODIUM 40 MG/1
40 TABLET, DELAYED RELEASE ORAL
Status: DISCONTINUED | OUTPATIENT
Start: 2023-03-08 | End: 2023-03-08 | Stop reason: HOSPADM

## 2023-03-08 RX ORDER — PROMETHAZINE HYDROCHLORIDE 25 MG/1
25 TABLET ORAL DAILY PRN
COMMUNITY
Start: 2023-02-15

## 2023-03-08 RX ORDER — FOLIC ACID 1 MG/1
1 TABLET ORAL DAILY
COMMUNITY
Start: 2023-03-01

## 2023-03-08 RX ORDER — HAND SANITIZING WIPES 0.66 ML/ML
1000 SWAB TOPICAL EVERY 6 HOURS PRN
COMMUNITY
Start: 2023-02-28

## 2023-03-08 RX ORDER — ONDANSETRON 4 MG/1
4 TABLET, ORALLY DISINTEGRATING ORAL EVERY 8 HOURS PRN
Status: DISCONTINUED | OUTPATIENT
Start: 2023-03-08 | End: 2023-03-08 | Stop reason: HOSPADM

## 2023-03-08 RX ORDER — POLYETHYLENE GLYCOL 3350 17 G/17G
17 POWDER, FOR SOLUTION ORAL DAILY PRN
Status: DISCONTINUED | OUTPATIENT
Start: 2023-03-08 | End: 2023-03-08 | Stop reason: HOSPADM

## 2023-03-08 RX ORDER — SODIUM CHLORIDE 0.9 % (FLUSH) 0.9 %
5-40 SYRINGE (ML) INJECTION EVERY 12 HOURS SCHEDULED
Status: DISCONTINUED | OUTPATIENT
Start: 2023-03-08 | End: 2023-03-08 | Stop reason: HOSPADM

## 2023-03-08 RX ADMIN — PANTOPRAZOLE SODIUM 40 MG: 40 TABLET, DELAYED RELEASE ORAL at 07:26

## 2023-03-08 RX ADMIN — OXYCODONE HYDROCHLORIDE AND ACETAMINOPHEN 1 TABLET: 5; 325 TABLET ORAL at 11:27

## 2023-03-08 RX ADMIN — ACETAMINOPHEN 650 MG: 325 TABLET ORAL at 01:35

## 2023-03-08 RX ADMIN — OXYCODONE HYDROCHLORIDE AND ACETAMINOPHEN 1 TABLET: 5; 325 TABLET ORAL at 07:26

## 2023-03-08 RX ADMIN — SODIUM CHLORIDE: 9 INJECTION, SOLUTION INTRAVENOUS at 01:19

## 2023-03-08 ASSESSMENT — PAIN DESCRIPTION - LOCATION
LOCATION: ABDOMEN
LOCATION: RIB CAGE
LOCATION: ABDOMEN
LOCATION: RIB CAGE

## 2023-03-08 ASSESSMENT — PAIN SCALES - GENERAL
PAINLEVEL_OUTOF10: 7
PAINLEVEL_OUTOF10: 8
PAINLEVEL_OUTOF10: 7
PAINLEVEL_OUTOF10: 7

## 2023-03-08 ASSESSMENT — PAIN - FUNCTIONAL ASSESSMENT
PAIN_FUNCTIONAL_ASSESSMENT: ACTIVITIES ARE NOT PREVENTED

## 2023-03-08 ASSESSMENT — PAIN DESCRIPTION - ONSET
ONSET: ON-GOING

## 2023-03-08 ASSESSMENT — PAIN DESCRIPTION - DESCRIPTORS
DESCRIPTORS: CRAMPING
DESCRIPTORS: CRAMPING
DESCRIPTORS: SHARP;STABBING
DESCRIPTORS: SHARP;STABBING

## 2023-03-08 ASSESSMENT — PAIN DESCRIPTION - ORIENTATION
ORIENTATION: RIGHT;LEFT;ANTERIOR;POSTERIOR
ORIENTATION: RIGHT;LEFT;ANTERIOR;POSTERIOR
ORIENTATION: LOWER;UPPER
ORIENTATION: LOWER;UPPER

## 2023-03-08 ASSESSMENT — PAIN DESCRIPTION - PAIN TYPE
TYPE: ACUTE PAIN;SURGICAL PAIN

## 2023-03-08 ASSESSMENT — PAIN DESCRIPTION - FREQUENCY
FREQUENCY: CONTINUOUS

## 2023-03-08 NOTE — DISCHARGE SUMMARY
Discharge Summary    Raymundo Mathews  :  1990  MRN:  [id number]    Admit date:  3/7/2023      Discharge date: 3/8/2023     Admitting Physician:  Stacy Ledbetter MD    Discharge Diagnoses:    Principal Problem:    Pulmonary embolism following abortive pregnancy  Resolved Problems:    * No resolved hospital problems. *      Hospital Course:   Raymundo Mathews is a 34 y.o. female admitted with pulmonary emboli. She  presented to the emergency room with complaints of shortness of breath. Patient had associated lightheadedness and headache. Symptom onset 2 days. Patient was in [facility] last week and underwent a D&C after miscarrying at approximately 17 weeks gestation. Patient stated she was admitted at that time went through early labor and then life flighted to [facility] due to bleeding. Patient reported that she ended up receiving 1 unit of blood before being discharged and her hemoglobin was 8.2. She stated her bleeding subsided but does have some vaginal bleeding and abdominal cramping. She denied fever or chills. She denied nausea vomiting or diarrhea. Patient was seen in her OB office and recommended that she went to the emergency room for evaluation. During that time patient was found to be tachycardic with heart rate in the 130s. She was afebrile. CT chest showed pulmonary emboli. CT head was negative. Chest x-ray was negative for any acute process. Patient was started on Xarelto and given IV fluids. Upon evaluation today patient's pain continues but breathing is somewhat easier. Patient was admitted continued on IV fluids and placed on Percocet for pain which she tolerated. She was started on Xarelto. Bilateral venous Dopplers were completed and were negative for DVT. Plan will be to discharge patient home today I will give her prescription for Xarelto kit for initiation and then will defer to her primary care for continuation.   She will also be following up with pulmonology as an outpatient. Recommendation is approximately 3 to 6 months of treatment. Patient voiced her understanding to this and will be discharged today. Consultants:  none    Procedures: none    Complications: none    Discharge Condition: fair    Exam:  GEN:    Awake, alert and oriented x3. EYES:   EOMI, pupils equal   NECK: Supple. No lymphadenopathy. No carotid bruit  CVS:     regular rate and rhythm, no audible murmur  PULM:  CTA, no wheezes, rales or rhonchi, no acute respiratory distress  ABD:     Bowels sounds normal.  Abdomen is soft. No distention. no tenderness to palpation. EXT:     no edema bilaterally . No calf tenderness. NEURO: Moves all extremities. Motor and sensory are grossly intact  SKIN:    No rashes. No skin lesions.     Significant Diagnostic Studies:   Lab Results   Component Value Date    WBC 8.6 03/07/2023    HGB 11.0 (L) 03/07/2023     03/07/2023       Lab Results   Component Value Date    BUN 12 03/07/2023    CREATININE 0.60 03/07/2023     03/07/2023    K 3.6 (L) 03/07/2023    CALCIUM 8.5 (L) 03/07/2023     03/07/2023    CO2 24 03/07/2023    LABGLOM >60 03/07/2023       Lab Results   Component Value Date    WBCUA 2 TO 5 03/08/2023    RBCUA 5 TO 10 03/08/2023    EPITHUA 0 TO 2 03/08/2023    LEUKOCYTESUR NEGATIVE 03/08/2023    SPECGRAV <1.005 (L) 03/08/2023    GLUCOSEU NEGATIVE 03/08/2023    KETUA NEGATIVE 03/08/2023    PROTEINU NEGATIVE 03/08/2023    HGBUR 3+ (A) 03/08/2023    CASTUA NOT REPORTED 07/26/2021    CRYSTUA NOT REPORTED 07/26/2021    BACTERIA TRACE (A) 03/08/2023    YEAST NOT REPORTED 07/26/2021       CT HEAD WO CONTRAST    Result Date: 3/7/2023  EXAMINATION: CT OF THE HEAD WITHOUT CONTRAST  3/7/2023 10:09 pm TECHNIQUE: CT of the head was performed without the administration of intravenous contrast. Automated exposure control, iterative reconstruction, and/or weight based adjustment of the mA/kV was utilized to reduce the radiation dose to as low as reasonably achievable. COMPARISON: 06/29/2012 HISTORY: ORDERING SYSTEM PROVIDED HISTORY: Headache x 1 week TECHNOLOGIST PROVIDED HISTORY: Headache x 1 week Decision Support Exception - unselect if not a suspected or confirmed emergency medical condition->Emergency Medical Condition (MA) Is the patient pregnant?->No FINDINGS: BRAIN/VENTRICLES: There is no acute intracranial hemorrhage, mass effect or midline shift.  No abnormal extra-axial fluid collection.  The gray-white differentiation is maintained without evidence of an acute infarct.  There is no evidence of hydrocephalus. ORBITS: The visualized portion of the orbits demonstrate no acute abnormality. SINUSES: The visualized paranasal sinuses and mastoid air cells demonstrate no acute abnormality. SOFT TISSUES/SKULL:  No acute abnormality of the visualized skull or soft tissues.     No acute intracranial abnormality.     XR CHEST PORTABLE    Result Date: 3/7/2023  EXAMINATION: ONE XRAY VIEW OF THE CHEST 3/7/2023 9:39 pm COMPARISON: 02/20/2018 HISTORY: ORDERING SYSTEM PROVIDED HISTORY: dyspnea TECHNOLOGIST PROVIDED HISTORY: dyspnea FINDINGS: Cardiomediastinal silhouette is normal in size.  There is no pleural effusion or pneumothorax.  There is no pulmonary consolidation.  There is no acute osseous abnormality.     No acute cardiopulmonary abnormality.     CT CHEST PULMONARY EMBOLISM W CONTRAST    Result Date: 3/7/2023  EXAMINATION: CTA OF THE CHEST 3/7/2023 10:09 pm TECHNIQUE: CTA of the chest was performed after the administration of intravenous contrast.  Multiplanar reformatted images are provided for review.  MIP images are provided for review. Automated exposure control, iterative reconstruction, and/or weight based adjustment of the mA/kV was utilized to reduce the radiation dose to as low as reasonably achievable. COMPARISON: None. HISTORY: ORDERING SYSTEM PROVIDED HISTORY: sob; POST D/C one week ago TECHNOLOGIST PROVIDED  HISTORY: sob; POST D/C one week ago Decision Support Exception - unselect if not a suspected or confirmed emergency medical condition->Emergency Medical Condition (MA) FINDINGS: Pulmonary Arteries: Pulmonary arteries are adequately opacified for evaluation. Subtle filling defect is noted within a subsegmental branch in the right lower lobe (for example series 2, image 109) with a possible segmental filling defect in the left lower lobe (image 104). No proximal pulmonary embolus is otherwise identified. Main pulmonary artery is normal in caliber. Mediastinum: The heart size within normal limits. No pericardial effusion. The thoracic aorta is normal in caliber. No pathologically enlarged adenopathy. The esophagus is unremarkable. Lungs/pleura: Calcified granuloma in the left upper lobe. No focal consolidation, pleural effusion or pneumothorax. Upper Abdomen: Limited images of the upper abdomen are unremarkable. Soft Tissues/Bones: No acute bone or soft tissue abnormality. 1. Subtle filling defects within a subsegmental branch of the right lower lobe and segmental branch of the left lower lobe suspicious for pulmonary emboli. 2. Otherwise no acute cardiopulmonary findings. ritical results were called by Dr. Dimitris Costa. Josey Terry MD to Dr. Denver Rave, on 3/7/2023 at 22:55. Assessment and Plan:  Patient Active Problem List    Diagnosis Date Noted    Pulmonary embolism following abortive pregnancy 03/08/2023    Back pain 04/27/2021    Renal lithiasis 05/23/2016    Ureteral calculus 05/23/2016    GERD (gastroesophageal reflux disease) 09/08/2014        Discharge Medications:         Medication List        START taking these medications      oxyCODONE-acetaminophen 5-325 MG per tablet  Commonly known as: PERCOCET  Take 1 tablet by mouth every 4 hours as needed for Pain for up to 3 days. Max Daily Amount: 6 tablets     rivaroxaban 15 & 20 MG Starter Pack  Take as directed on package.             CONTINUE taking these medications      ferrous sulfate 325 (65 Fe) MG EC tablet  Commonly known as: FE TABS 325     folic acid 1 MG tablet  Commonly known as: FOLVITE     ibuprofen 800 MG tablet  Commonly known as: ADVIL;MOTRIN  Take 1 tablet by mouth every 8 hours     omeprazole 10 MG delayed release capsule  Commonly known as: PRILOSEC     promethazine 25 MG tablet  Commonly known as: PHENERGAN     RA Col-Rite 100 MG capsule  Generic drug: docusate sodium     RA Pain Relief Acetaminophen 500 MG tablet  Generic drug: acetaminophen     tiZANidine 2 MG tablet  Commonly known as: ZANAFLEX            STOP taking these medications      neomycin-polymyxin-hydrocortisone 1 % Soln otic solution               Where to Get Your Medications        These medications were sent to [facility] #[id number] - [place], [place] - [address] -  [phone] - F [phone]  [address] [address]      Phone: [phone]   oxyCODONE-acetaminophen 5-325 MG per tablet  rivaroxaban 15 & 20 MG Starter Pack         Patient Instructions:   Activity: activity as tolerated  Diet: encourage fluids  Wound Care: none needed  Other: None    Disposition:   Discharge to Home    Follow up:  Patient will be followed by FÁTIMA Harper CNP in 1-2 weeks    CORE MEASURES on Discharge (if applicable)  ACE/ARB in CHF: NA  Statin in MI: NA  ASA in MI: NA  Statin in CVA: NA  Antiplatelet in CVA: NA    Total time spent on discharge services: 40 minutes    Including the following activities:  Evaluation and Management of patient  Discussion with patient and/or surrogate about current care plan  Coordination with Case Management and/or   Coordination of care with Consultants (if applicable)   Coordination of care with Receiving Facility Physician (if applicable)  Completion of DME forms (if applicable)  Preparation of Discharge Summary  Preparation of Medication Reconciliation  Preparation of Discharge Prescriptions    Signed:  Manjula SALGADO  FÁTIMA Gorman - CNP, FÁTIMA, NP-C  3/8/2023, 10:47 AM

## 2023-03-08 NOTE — PROGRESS NOTES
Writer reviewed discharge instructions with patient. Patient aware of need to  prescriptions and follow up appointments. Writer provided handout about Pulmonary Emboli. Patient's questions answered. Copy of discharge instructions given to patient.

## 2023-03-08 NOTE — PROGRESS NOTES
Writer called on call Dr. Johnson for pain medication as patient states Tylenol does not help pain. Writer will administer tylenol to hold patient over until writer receives any further orders.

## 2023-03-08 NOTE — PLAN OF CARE
Problem: Discharge Planning  Goal: Discharge to home or other facility with appropriate resources  Outcome: Completed  Flowsheets (Taken 3/8/2023 0057 by Lisa Kc RN)  Discharge to home or other facility with appropriate resources: Identify barriers to discharge with patient and caregiver     Problem: Pain  Goal: Verbalizes/displays adequate comfort level or baseline comfort level  3/8/2023 1134 by Mariangel Cesar RN  Outcome: Completed  3/8/2023 0203 by Lisa Kc RN  Outcome: Progressing  Flowsheets  Taken 3/8/2023 0203  Verbalizes/displays adequate comfort level or baseline comfort level:   Encourage patient to monitor pain and request assistance   Assess pain using appropriate pain scale   Administer analgesics based on type and severity of pain and evaluate response   Implement non-pharmacological measures as appropriate and evaluate response  Taken 3/8/2023 0057  Verbalizes/displays adequate comfort level or baseline comfort level:   Encourage patient to monitor pain and request assistance   Assess pain using appropriate pain scale   Administer analgesics based on type and severity of pain and evaluate response   Implement non-pharmacological measures as appropriate and evaluate response  Note: Patient alert & oriented & able to call out appropriately when in pain. Will continue to monitor. Problem: Safety - Adult  Goal: Free from fall injury  3/8/2023 1134 by Mariangel Cesar RN  Outcome: Completed  3/8/2023 0203 by Lisa Kc RN  Outcome: Progressing  Note: Bed alarm on, bed locked, side rails up, gripper socks on, call light within reach and able to use appropriately. Will continue to monitor this shift.

## 2023-03-08 NOTE — H&P
History and Physical    Patient:  Viola Arana  MRN: [id number]    Chief Complaint: Shortness of breath    History Obtained From:  patient, electronic medical record    PCP: FÁTIMA Gallagher CNP    History of Present Illness: The patient is a 34 y.o. female who presented to the emergency room with complaints of shortness of breath. Patient had associated lightheadedness and headache. Symptom onset 2 days. Patient was in [facility] last week and underwent a D&C after miscarrying at approximately 17 weeks gestation. Patient stated she was admitted at that time went through early labor and then life flighted to [facility] due to bleeding. Patient reported that she ended up receiving 1 unit of blood before being discharged and her hemoglobin was 8.2. She stated her bleeding subsided but does have some vaginal bleeding and abdominal cramping. She denied fever or chills. She denied nausea vomiting or diarrhea. Patient was seen in her OB office and recommended that she went to the emergency room for evaluation. During that time patient was found to be tachycardic with heart rate in the 130s. She was afebrile. CT chest showed pulmonary emboli. CT head was negative. Chest x-ray was negative for any acute process. Patient was started on Xarelto and given IV fluids. Upon evaluation today patient's pain continues but breathing is somewhat easier.       Past Medical History:        Diagnosis Date    Anemia     GERD (gastroesophageal reflux disease)     Kidney stones     Postpartum depression        Past Surgical History:        Procedure Laterality Date    EXTERNAL EAR SURGERY Left     LITHOTRIPSY Right 08/25/2020    ESWL EXTRACORPOREAL SHOCK WAVE LITHOTRIPSY     LITHOTRIPSY Right 8/25/2020    ESWL EXTRACORPOREAL SHOCK WAVE LITH[facility]RIPSY [facility] Right 5/26/2016    right cysto HLL with stent exchange    TUBAL LIGATION N/A 7/21/2021    TUBAL LIGATION performed by Ismael Pleitez MD at [address] ENDOSCOPY      VAGINA SURGERY      LABIA HEMATOMA       Medications Prior to Admission:    Prior to Admission medications    Medication Sig Start Date End Date Taking? Authorizing Provider   RA PAIN RELIEF ACETAMINOPHEN 500 MG tablet Take 1,000 mg by mouth every 6 hours as needed 2/28/23   Historical Provider, MD KHAN COL-RITE 100 MG capsule Take 100 mg by mouth daily as needed 2/28/23   Historical Provider, MD   ferrous sulfate (FE TABS 325) 325 (65 Fe) MG EC tablet Take 325 mg by mouth daily 3/1/23   Historical Provider, MD   folic acid (FOLVITE) 1 MG tablet Take 1 mg by mouth daily 3/1/23   Historical Provider, MD   promethazine (PHENERGAN) 25 MG tablet Take 25 mg by mouth daily as needed 2/15/23   Historical Provider, MD   tiZANidine (ZANAFLEX) 2 MG tablet Take 2 mg by mouth 2 times daily as needed    Historical Provider, MD   neomycin-polymyxin-hydrocortisone 1 % SOLN otic solution INSTILL 4 DROPS INTO LEFT EAR EVERY 6 HOURS FOR 7 TO 10 DAYS  Patient not taking: Reported on 3/8/2023 11/9/21   Historical Provider, MD   ibuprofen (ADVIL;MOTRIN) 800 MG tablet Take 1 tablet by mouth every 8 hours 5/6/21   Tessie Sepulveda MD   omeprazole (PRILOSEC) 10 MG delayed release capsule Take 10 mg by mouth daily as needed    Historical Provider, MD       Allergies:  Reglan [metoclopramide] and Norco [hydrocodone-acetaminophen]    Social History:   TOBACCO:   reports that she has never smoked. She has never used smokeless tobacco.  ETOH:   reports that she does not currently use alcohol.     Family History:       Problem Relation Age of Onset    Ulcerative Colitis Father     Ulcerative Colitis Sister     Asthma Sister     Heart Disease Maternal Grandfather     Stroke Maternal Grandfather     High Blood Pressure Maternal Grandfather     Arthritis Maternal Grandmother     Vision Loss Maternal Grandmother        Allergies:  Reglan [metoclopramide] and Norco [hydrocodone-acetaminophen]    Medications Prior to Admission:    Prior to Admission medications    Medication Sig Start Date End Date Taking? Authorizing Provider   ERIN PAIN RELIEF ACETAMINOPHEN 500 MG tablet Take 1,000 mg by mouth every 6 hours as needed 2/28/23   Historical Provider, MD KHAN COL-RITE 100 MG capsule Take 100 mg by mouth daily as needed 2/28/23   Historical Provider, MD   ferrous sulfate (FE TABS 325) 325 (65 Fe) MG EC tablet Take 325 mg by mouth daily 3/1/23   Historical Provider, MD   folic acid (FOLVITE) 1 MG tablet Take 1 mg by mouth daily 3/1/23   Historical Provider, MD   promethazine (PHENERGAN) 25 MG tablet Take 25 mg by mouth daily as needed 2/15/23   Historical Provider, MD   tiZANidine (ZANAFLEX) 2 MG tablet Take 2 mg by mouth 2 times daily as needed    Historical Provider, MD   neomycin-polymyxin-hydrocortisone 1 % SOLN otic solution INSTILL 4 DROPS INTO LEFT EAR EVERY 6 HOURS FOR 7 TO 10 DAYS  Patient not taking: Reported on 3/8/2023 11/9/21   Historical Provider, MD   ibuprofen (ADVIL;MOTRIN) 800 MG tablet Take 1 tablet by mouth every 8 hours 5/6/21   Xavier Davidson MD   omeprazole (PRILOSEC) 10 MG delayed release capsule Take 10 mg by mouth daily as needed    Historical Provider, MD       Review of Systems:  Constitutional:negative  for fevers, and negative for chills.   Eyes: negative for visual disturbance   ENT: negative for sore throat, negative nasal congestion, and negative for earache  Respiratory: positive for shortness of breath, negative for cough, and negative for wheezing  Cardiovascular: negative for chest pain, negative for palpitations, and negative for syncope  Gastrointestinal: negative for abdominal pain, negative for nausea,negative for vomiting, negative for diarrhea, negative for constipation, and negative for hematochezia or melena  Genitourinary: negative for dysuria, negative for urinary urgency, negative for urinary frequency, and negative for hematuria  Skin: negative for skin rash, and negative for skin lesions  Neurological: negative for unilateral weakness, numbness or tingling. Physical Exam:    Vitals:   Temp: 97.2 °F (36.2 °C)  BP: 121/79  Resp: 20  Heart Rate: 91  SpO2: 98 %  24HR INTAKE/OUTPUT:  No intake or output data in the 24 hours ending 03/08/23 0709    Weight    Body mass index is 26.11 kg/m². Exam:  GEN:    Awake, alert and oriented x3. EYES:  EOMI, pupils equal   NECK: Supple. No lymphadenopathy. No carotid bruit  CVS:    regular rate and rhythm, no audible murmur  PULM:  CTA, no wheezes, rales or rhonchi, no acute respiratory distress  ABD:    Bowels sounds normal.  Abdomen is soft. No distention. no tenderness to palpation. EXT:   no edema bilaterally . No calf tenderness. NEURO: Moves all extremities. Motor and sensory are grossly intact  SKIN:  No rashes.   No skin lesions.    -----------------------------------------------------------------  Diagnostic Data:     DATA:    CBC:   Lab Results   Component Value Date    WBC 8.6 03/07/2023    RBC 3.51 (L) 03/07/2023    HGB 11.0 (L) 03/07/2023    HCT 32.1 (L) 03/07/2023    MCV 91.5 03/07/2023     03/07/2023        CMP:   Lab Results   Component Value Date    GLUCOSE 124 (H) 03/07/2023    BUN 12 03/07/2023    CREATININE 0.60 03/07/2023     03/07/2023    K 3.6 (L) 03/07/2023    CALCIUM 8.5 (L) 03/07/2023     03/07/2023    CO2 24 03/07/2023    PROT 7.3 07/26/2021    LABALBU 4.1 07/26/2021    BILITOT 0.25 (L) 07/26/2021    ALKPHOS 101 07/26/2021    ALT 46 (H) 07/26/2021    AST 32 (H) 07/26/2021       UA:   Lab Results   Component Value Date    COLORU Yellow 03/08/2023    SPECGRAV <1.005 (L) 03/08/2023    WBCUA 2 TO 5 03/08/2023    RBCUA 5 TO 10 03/08/2023    EPITHUA 0 TO 2 03/08/2023    LEUKOCYTESUR NEGATIVE 03/08/2023    GLUCOSEU NEGATIVE 03/08/2023    KETUA NEGATIVE 03/08/2023    PROTEINU NEGATIVE 03/08/2023    HGBUR 3+ (A) 03/08/2023    CASTUA NOT REPORTED 07/26/2021    CRYSTUA NOT REPORTED 07/26/2021    BACTERIA TRACE (A) 03/08/2023    YEAST NOT REPORTED 07/26/2021       Lactic Acid:   Lab Results   Component Value Date    LACTA 0.6 07/26/2021       D-Dimer:  No results found for: DDIMER    PT/INR:  No results found for: PROTIME, INR    High Sensitivity Troponin:  Recent Labs     03/07/23 2135   TROPHS <6       ABGs:   No results found for: PHART, PH, LQF8NQS, PCO2, PO2ART, PO2, BED2BMU, HCO3, BEART, BE, THGBART, THB, UTT3UGB, X7TACVNP, O2SAT, FIO2        CT CHEST PULMONARY EMBOLISM W CONTRAST   Preliminary Result   1. Subtle filling defects within a subsegmental branch of the right lower   lobe and segmental branch of the left lower lobe suspicious for pulmonary   emboli.   2. Otherwise no acute cardiopulmonary findings.   ritical results were called by Dr. Ramya Ash MD to Dr. Cao, on   3/7/2023 at 22:55.         CT HEAD WO CONTRAST   Final Result   No acute intracranial abnormality.         XR CHEST PORTABLE   Final Result   No acute cardiopulmonary abnormality.                   Assessment:    Principal Problem:    Pulmonary embolism following abortive pregnancy  Resolved Problems:    * No resolved hospital problems. *      Patient Active Problem List    Diagnosis Date Noted    Pulmonary embolism following abortive pregnancy 03/08/2023    Back pain 04/27/2021    Renal lithiasis 05/23/2016    Ureteral calculus 05/23/2016    GERD (gastroesophageal reflux disease) 09/08/2014       Plan:     MEDICAL DECISION MAKING:    Primary Problem(s): Pulmonary embolism following abortive pregnancy  Differential diagnoses: PN  Condition is an undiagnosed new problem with uncertain prognosis  Condition is stable  Treatment plan: Continue current treatment  Imaging: Venous doppler US ordered-negative  Medications:   Continue Xarelto  IVL  Medication Monitoring / High Risk Medications: none      Nutrition status:   Well developed, well nourished with no  malnutrition  Dietician consult initiated    Hospital Prophylaxis:   DVT: Xarelto   Stress Ulcer:  none      [facility] Data:   Test interpretation:  My independent EKG interpretation: sinus tachycardia  My independent X-ray interpretation:   Reviewed CT findings  Management and/or test interpretation discussed with ER MD at time of admission  Consults and Nursing notes were personally reviewed, all current labs and imaging were personally reviewed, tests ordered: CBC, BMP, and history obtained by independent historian       Disposition:  Shared decision making: All test results, treatment options and disposition options were discussed with the patient today  Social determinants of health that may impact management: none  Code status: Full Code   Disposition: Discharge plan is home        Critical Care Time:  Total critical care time caring for this patient with life threatening, unstable organ failure, including direct patient contact, management of life support systems, review of data including imaging and labs, discussions with other team members and physicians at least 0 minutes so far today, excluding separately billable procedures. [facility] Medication Reconciliation documentation:    [x] I have utilized all available immediate resources to obtain, update, or review the patient's current medications (including all prescriptions, over-the-counter products, herbals, cannabinoid products and bitamin/mineral/dietary/nutritional supplements. Leisa.Fallow 'yes\", Simon Bell     []  The patient is not eligible for medication reconciliation; the patient is in an emergent medical situation where delaying treatment would jeopardize the patient's health    []  I did NOT confirm, update or review the patient's current list of medications today.   [DOES NOT SATISFY [facility] PERFORMANCE]        [facility] Advanced Care Planning documentation:  [x] I have confirmed that the patient's Advance Care Plan is present, Code Status is documented, or surrogate decision maker is listed in the patient's medical record  [If \"yes\", STOP HERE]     [] The patient's [address] is NOT present because:    []  I confirmed today that the patient does not wish or was not able to name a   surrogate decision maker or provide and advance care plan.    [] Hospice care is currently being provided or has been provided within the   calendar year. []  I did NOT confirm today the presence of an [address] or surrogate   decision maker documented within the patient's medical record. [DOES NOT SATISFY [facility] PERFORMANCE]    CORE MEASURES  DVT prophylaxis: already on chronic anticoagulation  Decubitus ulcer present on admission: No  CODE STATUS: FULL CODE  Nutrition Status: good   Physical therapy: No   Old Charts reviewed: Yes  EKG Reviewed:  Yes  Advance Directive Addressed: Yes    FÁTIMA Bar - LEIGHTON, FÁTIMA, NP-C  3/8/2023, 7:09 AM

## 2023-03-08 NOTE — PLAN OF CARE
Problem: Pain  Goal: Verbalizes/displays adequate comfort level or baseline comfort level  Outcome: Progressing  Flowsheets  Taken 3/8/2023 0203  Verbalizes/displays adequate comfort level or baseline comfort level:   Encourage patient to monitor pain and request assistance   Assess pain using appropriate pain scale   Administer analgesics based on type and severity of pain and evaluate response   Implement non-pharmacological measures as appropriate and evaluate response  Taken 3/8/2023 0057  Verbalizes/displays adequate comfort level or baseline comfort level:   Encourage patient to monitor pain and request assistance   Assess pain using appropriate pain scale   Administer analgesics based on type and severity of pain and evaluate response   Implement non-pharmacological measures as appropriate and evaluate response  Note: Patient alert & oriented & able to call out appropriately when in pain. Will continue to monitor. Problem: Safety - Adult  Goal: Free from fall injury  Outcome: Progressing  Note: Bed alarm on, bed locked, side rails up, gripper socks on, call light within reach and able to use appropriately. Will continue to monitor this shift.

## 2023-03-08 NOTE — PROGRESS NOTES
Patient A&Ox4, calm and cooperative. Assessment and vital signs completed, see flowsheet. Patient reports pain 8/10 around entirety of rib cage, sharp and stabbing. Lungs diminished in bilateral bases. Light sensitivity persists but is better than last night per pt. Sinus rhythm on telemetry with intermittent tachycardia. Abdomen is soft and rounded, tenderness in bilateral lower quadrants. Bowel sounds active in all four quadrants. Suprapubic tenderness present as well. Patient resting in bed, call light within reach, denies further needs, will continue to monitor.

## 2023-03-08 NOTE — DISCHARGE INSTR - DIET
Good nutrition is important when healing from an illness, injury, or surgery. Follow any nutrition recommendations given to you during your hospital stay. If you were given an oral nutrition supplement while in the hospital, continue to take this supplement at home. You can take it with meals, in-between meals, and/or before bedtime. These supplements can be purchased at most local grocery stores, pharmacies, and chain [employer]stores. If you have any questions about your diet or nutrition, call the hospital and ask for the dietitian. Regular, increase fluid intake.

## 2023-03-08 NOTE — ED NOTES
Patient expressed instant relief of headache and abdominal pain after Morphine administration. Will monitor.      Eleanor George RN  03/07/23 9423

## 2023-03-08 NOTE — ED NOTES
Patient ambulated with continuous pulse ox. HR to 83, SPO2 93% on room air. Patient C/O sob with ambulation. Placed back in bed. SPO2 97 % HR 94. Dr. Neil Romberg notified. Will monitor.      Whitney Isabel RN  03/07/23 9440

## 2023-03-08 NOTE — ED PROVIDER NOTES
[facility] ED  EMERGENCY DEPARTMENT ENCOUNTER      Pt Name: Leoncio Garber  MRN: [id number]  [place] 1990  Date of evaluation: 3/7/2023  Provider: Laymon Heimlich, DO    CHIEF COMPLAINT       Chief Complaint   Patient presents with    Shortness of Breath     17wk miscarriage last week with d&c. Today dyspnea, lightheaded, some bleeding. OB sent over to be evaluated         HISTORY OF PRESENT ILLNESS   (Location/Symptom, Timing/Onset, Context/Setting, Quality, Duration, Modifying Factors, Severity)  Note limiting factors. Leoncio Garber is a 34 y.o. female who presents to the emergency department with complains of shortness of breath, lightheadedness, headache that has been going on for the past 2 days. Patient states that she was just in the hospital about a week ago and had a D&C performed after miscarrying at approximately 17 weeks of gestation. Patient states that she was admitted in the hospital and went through early labor and then the life flighted her to [facility] she was bleeding. She ended up receiving 1 unit of blood and upon discharge from the hospital her hemoglobin was at 8.2. She states that since then her bleeding is subsided quite a bit. However she continues to have a headache as well as shortness of breath and body aches. She denies any fevers or chills. She denies any cough or cold symptoms. She states that the shortness of breath comes and goes as is the headache. She has tried taking some Tylenol and Motrin without much relief. Today her headache has been the most persistent. She called her OBs office who recommended that she come to the hospital for further evaluation. Currently patient is tachycardic with a heart rate in the 130s. Patient also had an epidural done prior to the [facility]  and the labor but denies any worsening of her headache with positional changes.       REVIEW OF SYSTEMS    (2-9 systems for level 4, 10 or more for level 5)     Review of Systems   Constitutional:  Negative for fatigue and fever. HENT:  Negative for congestion and sore throat. Eyes:  Negative for visual disturbance. Respiratory:  Positive for shortness of breath. Cardiovascular:  Negative for chest pain and palpitations. Gastrointestinal:  Negative for abdominal distention. Genitourinary:  Negative for dysuria. Musculoskeletal:  Positive for myalgias. Negative for back pain. Skin:  Negative for rash. Neurological:  Positive for dizziness and headaches. Psychiatric/Behavioral:  Negative for suicidal ideas. Except as noted above the remainder of the review of systems was reviewed and negative.        PAST MEDICAL HISTORY     Past Medical History:   Diagnosis Date    Anemia     GERD (gastroesophageal reflux disease)     Kidney stones     Postpartum depression          SURGICAL HISTORY       Past Surgical History:   Procedure Laterality Date    EXTERNAL EAR SURGERY Left     LITHOTRIPSY Right 08/25/2020    ESWL EXTRACORPOREAL SHOCK WAVE LITHOTRIPSY     LITHOTRIPSY Right 8/25/2020    ESWL EXTRACORPOREAL SHOCK WAVE LITHOTRIPSY performed by Karlie Marie MD at [address] Right 5/26/2016    right cysto HLL with stent exchange    TUBAL LIGATION N/A 7/21/2021    TUBAL LIGATION performed by Alana Brasher MD at [address] [place] [address]      LABIA HEMATOMA         CURRENT MEDICATIONS       Previous Medications    IBUPROFEN (ADVIL;MOTRIN) 800 MG TABLET    Take 1 tablet by mouth every 8 hours    NEOMYCIN-POLYMYXIN-HYDROCORTISONE 1 % SOLN OTIC SOLUTION    INSTILL 4 DROPS INTO LEFT EAR EVERY 6 HOURS FOR 7 TO 10 DAYS    OMEPRAZOLE (PRILOSEC) 10 MG DELAYED RELEASE CAPSULE    Take 10 mg by mouth daily       ALLERGIES     Reglan [metoclopramide] and Norco [hydrocodone-acetaminophen]    FAMILY HISTORY       Family History   Problem Relation Age of Onset    Ulcerative Colitis Father     Ulcerative Colitis Sister Asthma Sister     Heart Disease Maternal Grandfather     Stroke Maternal Grandfather     High Blood Pressure Maternal Grandfather     Arthritis Maternal Grandmother     Vision Loss Maternal Grandmother           SOCIAL HISTORY       Social History     Socioeconomic History    Marital status:    Tobacco Use    Smoking status: Never    Smokeless tobacco: Never   Vaping Use    Vaping Use: Never used   Substance and Sexual Activity    Alcohol use: Not Currently     Comment: SOCIAL    Drug use: No    Sexual activity: Yes     Partners: Male       SCREENINGS        [place] Coma Scale  Eye Opening: Spontaneous  Best Verbal Response: Oriented  Best Motor Response: Obeys commands  [place]ance Coma Scale Score: 15               PHYSICAL EXAM    (up to 7 for level 4, 8 or more for level 5)     ED Triage Vitals [03/07/23 2115]   BP Temp Temp Source Heart Rate Resp SpO2 Height Weight   105/78 98.5 °F (36.9 °C) Oral (!) 134 18 -- -- 150 lb (68 kg)       Physical Exam  Constitutional:       General: She is not in acute distress. Appearance: Normal appearance. She is not toxic-appearing. HENT:      Head: Normocephalic and atraumatic. Mouth/Throat:      Comments: Dry lips and mucous membranes  Eyes:      Extraocular Movements: Extraocular movements intact. Pupils: Pupils are equal, round, and reactive to light. Cardiovascular:      Rate and Rhythm: Regular rhythm. Tachycardia present. Pulses: Normal pulses. Heart sounds: Normal heart sounds. Pulmonary:      Effort: Pulmonary effort is normal.      Breath sounds: Normal breath sounds. Abdominal:      General: Abdomen is flat. Bowel sounds are normal.      Palpations: Abdomen is soft. Musculoskeletal:         General: Normal range of motion. Skin:     General: Skin is warm and dry. Capillary Refill: Capillary refill takes less than 2 seconds. Neurological:      General: No focal deficit present.       Mental Status: She is alert and oriented to person, place, and time. Psychiatric:         Mood and Affect: Mood normal.       DIAGNOSTIC RESULTS     EKG: All EKG's are interpreted by the Emergency Department Physician who either signs or Co-signs this chart in the absence of a cardiologist.    Sinus tachycardia with a heart rate of 115. No acute ST-T wave changes. RADIOLOGY:   Non-plain film images such as CT, Ultrasound and MRI are read by the radiologist. Plain radiographic images are visualized and preliminarily interpreted by the emergency physician with the below findings:        Interpretation per the Radiologist below, if available at the time of this note:    CT CHEST PULMONARY EMBOLISM W CONTRAST   Preliminary Result   1. Subtle filling defects within a subsegmental branch of the right lower   lobe and segmental branch of the left lower lobe suspicious for pulmonary   emboli. 2. Otherwise no acute cardiopulmonary findings. ritical results were called by Dr. Pena Console. Manas Lugo MD to Dr. Briana Morales, on   3/7/2023 at 22:55. CT HEAD WO CONTRAST   Final Result   No acute intracranial abnormality. XR CHEST PORTABLE   Final Result   No acute cardiopulmonary abnormality. ED BEDSIDE ULTRASOUND:   Performed by ED Physician - none    LABS:  Labs Reviewed   BASIC METABOLIC PANEL - Abnormal; Notable for the following components:       Result Value    Glucose 124 (*)     Calcium 8.5 (*)     Potassium 3.6 (*)     All other components within normal limits   CBC WITH AUTO DIFFERENTIAL - Abnormal; Notable for the following components:    RBC 3.51 (*)     Hemoglobin 11.0 (*)     Hematocrit 32.1 (*)     Seg Neutrophils 76 (*)     Lymphocytes 18 (*)     Immature Granulocytes 1 (*)     All other components within normal limits   BRAIN NATRIURETIC PEPTIDE   TROPONIN   URINALYSIS WITH REFLEX TO CULTURE       All other labs were within normal range or not returned as of this dictation.     EMERGENCY DEPARTMENT COURSE and DIFFERENTIAL DIAGNOSIS/MDM:   Vitals:    Vitals:    03/07/23 2115 03/07/23 2150 03/07/23 2251 03/07/23 2337   BP: 105/78  109/65 119/83   Pulse: (!) 134  (!) 110 (!) 104   Resp: 18 20 23   Temp: 98.5 °F (36.9 °C)      TempSrc: Oral      SpO2:  95% 96% 95%   Weight: 150 lb (68 kg)            MDM     Amount and/or Complexity of Data Reviewed  Clinical lab tests: ordered and reviewed  Tests in the radiology section of CPT®: ordered and reviewed  Tests in the medicine section of CPT®: ordered and reviewed  Discussion of test results with the performing providers: yes  Decide to obtain previous medical records or to obtain history from someone other than the patient: yes  Obtain history from someone other than the patient: yes  Review and summarize past medical records: yes  Discuss the patient with other providers: yes  Independent visualization of images, tracings, or specimens: yes    Risk of Complications, Morbidity, and/or Mortality  Presenting problems: moderate  Diagnostic procedures: moderate  Management options: moderate    Patient Progress  Patient progress: stable        REASSESSMENT     ED Course as of 03/08/23 0008   Wed Mar 08, 2023   0004 We got the patient up and ambulated. Patient maintaining oxygen saturations in the low 90s however was clinically winded and dyspneic. Patient states that she felt like she had to really exert herself to breathe when she was walking. Heart rate has improved slightly after IV fluids however still remains in the upper 90s and low 100s. Given the patient will now be started on blood thinners and she continues to have some mild vaginal bleeding it might be better to observe her overnight for her breathing as well as her vaginal bleeding. Patient states that she would prefer staying in the hospital overnight to be watched for her breathing. [JI]   0006 I spoke with Dr. Chaya Santana, hospitalist who will admit the patient.  [JI]      ED Course User Index  [JI] Jessika Velazquez,  Discussed results with the patient. Patient's heart rate has improved slightly however with ambulation she clinically gets dyspneic and winded. Given the CT chest findings of bilateral subsegmental PEs and a recent admission in the hospital for blood transfusion post D&C, recommend admission for observation. Patient also states that she would prefer to stay in the hospital overnight to be watched for her symptoms. Patient remains hemodynamically stable at this time. She is not requiring oxygen at this time. On CT of her chest there were no signs of a right heart strain. I spoke with Dr. Chaya Santana, hospitalist who will admit the patient. FINAL IMPRESSION      1. Bilateral pulmonary embolism ([address]    2. Shortness of breath    3. Tachycardia          DISPOSITION/PLAN   DISPOSITION Decision To Admit 03/08/2023 12:08:45 AM      PATIENT REFERRED TO:  No follow-up provider specified. DISCHARGE MEDICATIONS:  New Prescriptions    No medications on file     Controlled Substances Monitoring:     No flowsheet data found.     (Please note that portions of this note were completed with a voice recognition program.  Efforts were made to edit the dictations but occasionally words are mis-transcribed.)    Jessika Velazquez DO (electronically signed)  Attending Emergency Physician            Jessika Velazquez DO  03/08/23 0009

## 2023-03-16 ENCOUNTER — HOSPITAL ENCOUNTER (EMERGENCY)
Age: 33
Discharge: HOME | End: 2023-03-16
Attending: EMERGENCY MEDICINE
Payer: COMMERCIAL

## 2023-03-16 ENCOUNTER — APPOINTMENT (OUTPATIENT)
Dept: GENERAL RADIOLOGY | Age: 33
End: 2023-03-16
Payer: COMMERCIAL

## 2023-03-16 VITALS
RESPIRATION RATE: 20 BRPM | DIASTOLIC BLOOD PRESSURE: 83 MMHG | OXYGEN SATURATION: 99 % | HEART RATE: 92 BPM | SYSTOLIC BLOOD PRESSURE: 121 MMHG | TEMPERATURE: 98.3 F

## 2023-03-16 DIAGNOSIS — R07.9 CHEST PAIN, UNSPECIFIED TYPE: Primary | ICD-10-CM

## 2023-03-16 DIAGNOSIS — I26.99 ACUTE PULMONARY EMBOLISM, UNSPECIFIED PULMONARY EMBOLISM TYPE, UNSPECIFIED WHETHER ACUTE COR PULMONALE PRESENT (HCC): ICD-10-CM

## 2023-03-16 LAB
ABSOLUTE EOS #: 0.1 K/UL (ref 0–0.44)
ABSOLUTE IMMATURE GRANULOCYTE: <0.03 K/UL (ref 0–0.3)
ABSOLUTE LYMPH #: 2.64 K/UL (ref 1.1–3.7)
ABSOLUTE MONO #: 0.45 K/UL (ref 0.1–1.2)
ANION GAP SERPL CALCULATED.3IONS-SCNC: 11 MMOL/L (ref 9–17)
BASOPHILS # BLD: 1 % (ref 0–2)
BASOPHILS ABSOLUTE: 0.05 K/UL (ref 0–0.2)
BNP SERPL-MCNC: <36 PG/ML
BUN SERPL-MCNC: 14 MG/DL (ref 6–20)
BUN/CREAT BLD: 20 (ref 9–20)
CALCIUM SERPL-MCNC: 9.3 MG/DL (ref 8.6–10.4)
CHLORIDE SERPL-SCNC: 105 MMOL/L (ref 98–107)
CO2 SERPL-SCNC: 25 MMOL/L (ref 20–31)
CREAT SERPL-MCNC: 0.71 MG/DL (ref 0.5–0.9)
EKG ATRIAL RATE: 87 BPM
EKG P AXIS: 55 DEGREES
EKG P-R INTERVAL: 136 MS
EKG Q-T INTERVAL: 362 MS
EKG QRS DURATION: 78 MS
EKG QTC CALCULATION (BAZETT): 435 MS
EKG R AXIS: 23 DEGREES
EKG T AXIS: 8 DEGREES
EKG VENTRICULAR RATE: 87 BPM
EOSINOPHILS RELATIVE PERCENT: 1 % (ref 1–4)
GFR SERPL CREATININE-BSD FRML MDRD: >60 ML/MIN/1.73M2
GLUCOSE SERPL-MCNC: 86 MG/DL (ref 70–99)
HCT VFR BLD AUTO: 32.8 % (ref 36.3–47.1)
HGB BLD-MCNC: 10.4 G/DL (ref 11.9–15.1)
IMMATURE GRANULOCYTES: 0 %
LYMPHOCYTES # BLD: 27 % (ref 24–43)
MCH RBC QN AUTO: 28.7 PG (ref 25.2–33.5)
MCHC RBC AUTO-ENTMCNC: 31.7 G/DL (ref 28.4–34.8)
MCV RBC AUTO: 90.6 FL (ref 82.6–102.9)
MONOCYTES # BLD: 5 % (ref 3–12)
NRBC AUTOMATED: 0 PER 100 WBC
PDW BLD-RTO: 12.6 % (ref 11.8–14.4)
PLATELET # BLD AUTO: 309 K/UL (ref 138–453)
PMV BLD AUTO: 10.4 FL (ref 8.1–13.5)
POTASSIUM SERPL-SCNC: 4.4 MMOL/L (ref 3.7–5.3)
RBC # BLD: 3.62 M/UL (ref 3.95–5.11)
SEG NEUTROPHILS: 66 % (ref 36–65)
SEGMENTED NEUTROPHILS ABSOLUTE COUNT: 6.4 K/UL (ref 1.5–8.1)
SODIUM SERPL-SCNC: 141 MMOL/L (ref 135–144)
TROPONIN I SERPL DL<=0.01 NG/ML-MCNC: <6 NG/L (ref 0–14)
WBC # BLD AUTO: 9.7 K/UL (ref 3.5–11.3)

## 2023-03-16 PROCEDURE — 83880 ASSAY OF NATRIURETIC PEPTIDE: CPT

## 2023-03-16 PROCEDURE — 93010 ELECTROCARDIOGRAM REPORT: CPT | Performed by: FAMILY MEDICINE

## 2023-03-16 PROCEDURE — 99285 EMERGENCY DEPT VISIT HI MDM: CPT

## 2023-03-16 PROCEDURE — 96372 THER/PROPH/DIAG INJ SC/IM: CPT

## 2023-03-16 PROCEDURE — 36415 COLL VENOUS BLD VENIPUNCTURE: CPT

## 2023-03-16 PROCEDURE — 85025 COMPLETE CBC W/AUTO DIFF WBC: CPT

## 2023-03-16 PROCEDURE — 84484 ASSAY OF TROPONIN QUANT: CPT

## 2023-03-16 PROCEDURE — 2580000003 HC RX 258: Performed by: EMERGENCY MEDICINE

## 2023-03-16 PROCEDURE — 71045 X-RAY EXAM CHEST 1 VIEW: CPT

## 2023-03-16 PROCEDURE — 80048 BASIC METABOLIC PNL TOTAL CA: CPT

## 2023-03-16 PROCEDURE — 6360000002 HC RX W HCPCS: Performed by: EMERGENCY MEDICINE

## 2023-03-16 PROCEDURE — 93005 ELECTROCARDIOGRAM TRACING: CPT | Performed by: EMERGENCY MEDICINE

## 2023-03-16 RX ORDER — KETOROLAC TROMETHAMINE 15 MG/ML
15 INJECTION, SOLUTION INTRAMUSCULAR; INTRAVENOUS ONCE
Status: COMPLETED | OUTPATIENT
Start: 2023-03-16 | End: 2023-03-16

## 2023-03-16 RX ORDER — OXYCODONE HYDROCHLORIDE AND ACETAMINOPHEN 5; 325 MG/1; MG/1
1 TABLET ORAL EVERY 4 HOURS PRN
Qty: 20 TABLET | Refills: 0 | Status: SHIPPED | OUTPATIENT
Start: 2023-03-16 | End: 2023-03-21

## 2023-03-16 RX ORDER — OXYCODONE HYDROCHLORIDE AND ACETAMINOPHEN 5; 325 MG/1; MG/1
1 TABLET ORAL ONCE
Status: DISCONTINUED | OUTPATIENT
Start: 2023-03-16 | End: 2023-03-16 | Stop reason: HOSPADM

## 2023-03-16 RX ORDER — 0.9 % SODIUM CHLORIDE 0.9 %
1000 INTRAVENOUS SOLUTION INTRAVENOUS ONCE
Status: COMPLETED | OUTPATIENT
Start: 2023-03-16 | End: 2023-03-16

## 2023-03-16 RX ADMIN — KETOROLAC TROMETHAMINE 15 MG: 15 INJECTION, SOLUTION INTRAMUSCULAR; INTRAVENOUS at 17:18

## 2023-03-16 RX ADMIN — SODIUM CHLORIDE 1000 ML: 9 INJECTION, SOLUTION INTRAVENOUS at 17:18

## 2023-03-16 ASSESSMENT — PAIN DESCRIPTION - LOCATION
LOCATION: CHEST;BACK
LOCATION: CHEST

## 2023-03-16 ASSESSMENT — PAIN - FUNCTIONAL ASSESSMENT: PAIN_FUNCTIONAL_ASSESSMENT: 0-10

## 2023-03-16 ASSESSMENT — PAIN SCALES - GENERAL
PAINLEVEL_OUTOF10: 7
PAINLEVEL_OUTOF10: 8

## 2023-03-16 NOTE — ED PROVIDER NOTES
HPI:  3/16/23,   Time: 4:14 PM EDT         Carlitos Gutierrez is a 34 y.o. female presenting to the ED for patient diagnosed with pulmonary emboli bilaterally a week ago but concerned because her pain changed in location from her sides to her at the front of her chest, beginning today ago. The complaint has been constant, moderate in severity, and worsened by nothing. No fever chills or night sweats or fatigue and seems to be breathing okay. ROS:   Pertinent positives and negatives are stated within HPI, all other systems reviewed and are negative.  --------------------------------------------- PAST HISTORY ---------------------------------------------  Past Medical History:  has a past medical history of Anemia, GERD (gastroesophageal reflux disease), Kidney stones, and Postpartum depression. Past Surgical History:  has a past surgical history that includes External ear surgery (Left); Upper gastrointestinal endoscopy; other surgical history (Right, 5/26/2016); Vagina surgery; Lithotripsy (Right, 08/25/2020); Lithotripsy (Right, 8/25/2020); and Tubal ligation (N/A, 7/21/2021). Social History:  reports that she has never smoked. She has never used smokeless tobacco. She reports that she does not currently use alcohol. She reports that she does not use drugs. Family History: family history includes Arthritis in her maternal grandmother; Asthma in her sister; Heart Disease in her maternal grandfather; High Blood Pressure in her maternal grandfather; Stroke in her maternal grandfather; Ulcerative Colitis in her father and sister; Vision Loss in her maternal grandmother. The patients home medications have been reviewed.     Allergies: Reglan [metoclopramide] and Norco [hydrocodone-acetaminophen]    -------------------------------------------------- RESULTS -------------------------------------------------  All laboratory and radiology results have been personally reviewed by myself   LABS:  Results for orders placed or performed during the hospital encounter of 03/16/23   CBC with Auto Differential   Result Value Ref Range    WBC 9.7 3.5 - 11.3 k/uL    RBC 3.62 (L) 3.95 - 5.11 m/uL    Hemoglobin 10.4 (L) 11.9 - 15.1 g/dL    Hematocrit 32.8 (L) 36.3 - 47.1 %    MCV 90.6 82.6 - 102.9 fL    MCH 28.7 25.2 - 33.5 pg    MCHC 31.7 28.4 - 34.8 g/dL    RDW 12.6 11.8 - 14.4 %    Platelets 050 251 - 439 k/uL    MPV 10.4 8.1 - 13.5 fL    NRBC Automated 0.0 0.0 per 100 WBC    Seg Neutrophils 66 (H) 36 - 65 %    Lymphocytes 27 24 - 43 %    Monocytes 5 3 - 12 %    Eosinophils % 1 1 - 4 %    Basophils 1 0 - 2 %    Immature Granulocytes 0 0 %    Segs Absolute 6.40 1.50 - 8.10 k/uL    Absolute Lymph # 2.64 1.10 - 3.70 k/uL    Absolute Mono # 0.45 0.10 - 1.20 k/uL    Absolute Eos # 0.10 0.00 - 0.44 k/uL    Basophils Absolute 0.05 0.00 - 0.20 k/uL    Absolute Immature Granulocyte <0.03 0.00 - 0.30 k/uL   BMP   Result Value Ref Range    Glucose 86 70 - 99 mg/dL    BUN 14 6 - 20 mg/dL    Creatinine 0.71 0.50 - 0.90 mg/dL    Est, Glom Filt Rate >60 >60 mL/min/1.73m2    Bun/Cre Ratio 20 9 - 20    Calcium 9.3 8.6 - 10.4 mg/dL    Sodium 141 135 - 144 mmol/L    Potassium 4.4 3.7 - 5.3 mmol/L    Chloride 105 98 - 107 mmol/L    CO2 25 20 - 31 mmol/L    Anion Gap 11 9 - 17 mmol/L   Troponin   Result Value Ref Range    Troponin, High Sensitivity <6 0 - 14 ng/L   Brain Natriuretic Peptide   Result Value Ref Range    Pro-BNP <36 <300 pg/mL   EKG 12 Lead   Result Value Ref Range    Ventricular Rate 87 BPM    Atrial Rate 87 BPM    P-R Interval 136 ms    QRS Duration 78 ms    Q-T Interval 362 ms    QTc Calculation (Bazett) 435 ms    P Axis 55 degrees    R Axis 23 degrees    T Axis 8 degrees       RADIOLOGY:  Interpreted by Radiologist.  XR CHEST PORTABLE   Final Result   No acute cardiopulmonary process             ------------------------- NURSING NOTES AND VITALS REVIEWED ---------------------------   The nursing notes within the ED encounter and vital signs as below have been reviewed. /83   Pulse 92   Temp 98.3 °F (36.8 °C) (Oral)   Resp 20   SpO2 99%   Oxygen Saturation Interpretation: Normal      ---------------------------------------------------PHYSICAL EXAM--------------------------------------      Constitutional/General: Alert and oriented x3, well appearing, non toxic in NAD  Head: NC/AT  Eyes: PERRL, EOMI  Mouth: Oropharynx clear, handling secretions, no trismus  Neck: Supple, full ROM, no meningeal signs  Pulmonary: Lungs clear to auscultation bilaterally, no wheezes, rales, or rhonchi. Not in respiratory distress  Cardiovascular:  Regular rate and rhythm, no murmurs, gallops, or rubs. 2+ distal pulses  Abdomen: Soft, non tender, non distended,   Extremities: Moves all extremities x 4. Warm and well perfused  Skin: warm and dry without rash  Neurologic: GCS 15,  Psych: Normal Affect      ------------------------------ ED COURSE/MEDICAL DECISION MAKING----------------------  Medications   0.9 % sodium chloride bolus (1,000 mLs IntraVENous New Bag 3/16/23 1718)   oxyCODONE-acetaminophen (PERCOCET) 5-325 MG per tablet 1 tablet (has no administration in time range)   ketorolac (TORADOL) injection 15 mg (15 mg IntraMUSCular Given 3/16/23 1718)         Medical Decision Making:    Educated patient regarding PE and no need for additional tPA because her O2 sat is 100% and heart rate is 92 but we will check cardiac status    Counseling: The emergency provider has spoken with the patient and discussed todays results, in addition to providing specific details for the plan of care and counseling regarding the diagnosis and prognosis. Questions are answered at this time and they are agreeable with the plan.      --------------------------------- IMPRESSION AND DISPOSITION ---------------------------------    IMPRESSION  1. Chest pain, unspecified type Stable   2.  Acute pulmonary embolism, unspecified pulmonary embolism type, unspecified whether acute cor pulmonale present ([facility] Stable       DISPOSITION  Disposition: Discharge to home  Patient condition is stable                  Dung Hood MD  03/16/23 7869

## 2023-03-30 ENCOUNTER — OFFICE VISIT (OUTPATIENT)
Dept: PULMONOLOGY | Age: 33
End: 2023-03-30
Payer: COMMERCIAL

## 2023-03-30 VITALS
WEIGHT: 143.6 LBS | BODY MASS INDEX: 25.45 KG/M2 | OXYGEN SATURATION: 100 % | TEMPERATURE: 96.5 F | RESPIRATION RATE: 16 BRPM | DIASTOLIC BLOOD PRESSURE: 79 MMHG | HEART RATE: 85 BPM | SYSTOLIC BLOOD PRESSURE: 125 MMHG | HEIGHT: 63 IN

## 2023-03-30 DIAGNOSIS — I26.99 BILATERAL PULMONARY EMBOLISM (HCC): ICD-10-CM

## 2023-03-30 DIAGNOSIS — I26.99 ACUTE PULMONARY EMBOLISM WITHOUT ACUTE COR PULMONALE, UNSPECIFIED PULMONARY EMBOLISM TYPE (HCC): Primary | ICD-10-CM

## 2023-03-30 PROCEDURE — G8484 FLU IMMUNIZE NO ADMIN: HCPCS | Performed by: INTERNAL MEDICINE

## 2023-03-30 PROCEDURE — G8427 DOCREV CUR MEDS BY ELIG CLIN: HCPCS | Performed by: INTERNAL MEDICINE

## 2023-03-30 PROCEDURE — 99204 OFFICE O/P NEW MOD 45 MIN: CPT | Performed by: INTERNAL MEDICINE

## 2023-03-30 PROCEDURE — G8419 CALC BMI OUT NRM PARAM NOF/U: HCPCS | Performed by: INTERNAL MEDICINE

## 2023-03-30 PROCEDURE — 1036F TOBACCO NON-USER: CPT | Performed by: INTERNAL MEDICINE

## 2023-03-30 NOTE — PROGRESS NOTES
provoked and she will need therapy with anticoagulation for 3 months at the most 6 months. I have discussed with her after completion of therapy the risk in the future would include pregnancy/postpartum long travel, long flight, immobility, acute illness and hospitalization and surgeries. Plan and recommendation:    Continue with Xarelto 20 mg once daily. She will need anticoagulation therapy for 3 months. Discussed with patient about increase activity, exercise and weight loss. She does not have any other risk factor for thromboembolism. Risk for the future when she is off anticoagulation discussed with the patient. Since she does complain of shortness of breath on activities although she is active now and some dizziness and as she had pregnancy postpartum I will order echocardiogram to determine the right-sided heart function or any evidence of pulm hypertension or cardiomyopathy. Vaccinations recommended and very for flu  Up to date with vaccinations from [facility] an active lifestyle   Questions answered pertaining to diagnosis and management explained importance of compliance with therapy       It was my pleasure to evaluate Leoncio Garber today. Please call with questions.     Rickey Lake MD, MD             3/30/2023, 2:13 PM

## 2023-03-30 NOTE — PATIENT INSTRUCTIONS
SURVEY:    You may be receiving a survey from [employer] regarding your visit today. Please complete the survey to enable us to provide the highest quality of care to you and your family. If you cannot score us a very good on any question, please call the office to discuss how we could have made your experience a very good one. Thank you.

## 2023-04-02 ENCOUNTER — APPOINTMENT (OUTPATIENT)
Dept: GENERAL RADIOLOGY | Age: 33
End: 2023-04-02
Payer: COMMERCIAL

## 2023-04-02 ENCOUNTER — HOSPITAL ENCOUNTER (EMERGENCY)
Age: 33
Discharge: HOME | End: 2023-04-02
Attending: EMERGENCY MEDICINE
Payer: COMMERCIAL

## 2023-04-02 VITALS
DIASTOLIC BLOOD PRESSURE: 76 MMHG | SYSTOLIC BLOOD PRESSURE: 123 MMHG | HEART RATE: 106 BPM | OXYGEN SATURATION: 100 % | TEMPERATURE: 98.9 F

## 2023-04-02 DIAGNOSIS — U07.1 COVID-19: Primary | ICD-10-CM

## 2023-04-02 LAB
EKG ATRIAL RATE: 94 BPM
EKG P AXIS: 47 DEGREES
EKG P-R INTERVAL: 122 MS
EKG Q-T INTERVAL: 342 MS
EKG QRS DURATION: 68 MS
EKG QTC CALCULATION (BAZETT): 427 MS
EKG R AXIS: 29 DEGREES
EKG T AXIS: 13 DEGREES
EKG VENTRICULAR RATE: 94 BPM

## 2023-04-02 PROCEDURE — 93005 ELECTROCARDIOGRAM TRACING: CPT | Performed by: EMERGENCY MEDICINE

## 2023-04-02 PROCEDURE — 71045 X-RAY EXAM CHEST 1 VIEW: CPT

## 2023-04-02 PROCEDURE — 99284 EMERGENCY DEPT VISIT MOD MDM: CPT

## 2023-04-02 PROCEDURE — 6370000000 HC RX 637 (ALT 250 FOR IP): Performed by: EMERGENCY MEDICINE

## 2023-04-02 PROCEDURE — 93010 ELECTROCARDIOGRAM REPORT: CPT | Performed by: INTERNAL MEDICINE

## 2023-04-02 PROCEDURE — 94664 DEMO&/EVAL PT USE INHALER: CPT

## 2023-04-02 RX ORDER — BROMPHENIRAMINE MALEATE, PSEUDOEPHEDRINE HYDROCHLORIDE, AND DEXTROMETHORPHAN HYDROBROMIDE 2; 30; 10 MG/5ML; MG/5ML; MG/5ML
5 SYRUP ORAL 4 TIMES DAILY PRN
Qty: 240 ML | Refills: 1 | Status: SHIPPED | OUTPATIENT
Start: 2023-04-02 | End: 2023-05-02

## 2023-04-02 RX ORDER — PREDNISONE 50 MG/1
TABLET ORAL
Qty: 5 TABLET | Refills: 0 | Status: SHIPPED | OUTPATIENT
Start: 2023-04-02

## 2023-04-02 RX ORDER — ALBUTEROL SULFATE 90 UG/1
2 AEROSOL, METERED RESPIRATORY (INHALATION) EVERY 4 HOURS PRN
Qty: 18 G | Refills: 1 | Status: SHIPPED | OUTPATIENT
Start: 2023-04-02 | End: 2024-04-01

## 2023-04-02 RX ORDER — IPRATROPIUM BROMIDE AND ALBUTEROL SULFATE 2.5; .5 MG/3ML; MG/3ML
1 SOLUTION RESPIRATORY (INHALATION) ONCE
Status: COMPLETED | OUTPATIENT
Start: 2023-04-02 | End: 2023-04-02

## 2023-04-02 RX ADMIN — IPRATROPIUM BROMIDE AND ALBUTEROL SULFATE 1 AMPULE: 2.5; .5 SOLUTION RESPIRATORY (INHALATION) at 15:41

## 2023-04-02 ASSESSMENT — PAIN - FUNCTIONAL ASSESSMENT: PAIN_FUNCTIONAL_ASSESSMENT: NONE - DENIES PAIN

## 2023-04-02 NOTE — ED PROVIDER NOTES
HPI:  4/2/23,   Time: 3:26 PM EDT         Phuong Escobedo is a 34 y.o. female presenting to the ED for gradual onset of achy chest pain and pressure especially with cough and just diagnosed with COVID per home test today but also has pulmonary embolism but is already on Xarelto, beginning last 2 days ago. The complaint has been constant, moderate in severity, and worsened by deep breath. And cough. No fever at present and no abdominal pain vomiting or diarrhea and no significant difficulty breathing    ROS:   Pertinent positives and negatives are stated within HPI, all other systems reviewed and are negative.  --------------------------------------------- PAST HISTORY ---------------------------------------------  Past Medical History:  has a past medical history of Anemia, GERD (gastroesophageal reflux disease), Kidney stones, and Postpartum depression. Past Surgical History:  has a past surgical history that includes External ear surgery (Left); Upper gastrointestinal endoscopy; other surgical history (Right, 5/26/2016); Vagina surgery; Lithotripsy (Right, 08/25/2020); Lithotripsy (Right, 8/25/2020); and Tubal ligation (N/A, 7/21/2021). Social History:  reports that she has never smoked. She has never used smokeless tobacco. She reports that she does not currently use alcohol. She reports that she does not use drugs. Family History: family history includes Arthritis in her maternal grandmother; Asthma in her sister; Heart Disease in her maternal grandfather; High Blood Pressure in her maternal grandfather; Stroke in her maternal grandfather; Ulcerative Colitis in her father and sister; Vision Loss in her maternal grandmother. The patients home medications have been reviewed.     Allergies: Reglan [metoclopramide] and Norco [hydrocodone-acetaminophen]    -------------------------------------------------- RESULTS -------------------------------------------------  All laboratory and radiology

## 2023-04-12 ENCOUNTER — HOSPITAL ENCOUNTER (EMERGENCY)
Age: 33
Discharge: HOME | End: 2023-04-12
Payer: COMMERCIAL

## 2023-04-12 VITALS
SYSTOLIC BLOOD PRESSURE: 111 MMHG | DIASTOLIC BLOOD PRESSURE: 84 MMHG | OXYGEN SATURATION: 100 % | RESPIRATION RATE: 15 BRPM | TEMPERATURE: 98.4 F | HEART RATE: 106 BPM

## 2023-04-12 DIAGNOSIS — M54.42 ACUTE LEFT-SIDED LOW BACK PAIN WITH LEFT-SIDED SCIATICA: Primary | ICD-10-CM

## 2023-04-12 PROCEDURE — 99284 EMERGENCY DEPT VISIT MOD MDM: CPT

## 2023-04-12 PROCEDURE — 6360000002 HC RX W HCPCS: Performed by: PHYSICIAN ASSISTANT

## 2023-04-12 PROCEDURE — 96372 THER/PROPH/DIAG INJ SC/IM: CPT

## 2023-04-12 PROCEDURE — 6370000000 HC RX 637 (ALT 250 FOR IP): Performed by: PHYSICIAN ASSISTANT

## 2023-04-12 RX ORDER — OXYCODONE HYDROCHLORIDE AND ACETAMINOPHEN 5; 325 MG/1; MG/1
1 TABLET ORAL EVERY 6 HOURS PRN
Qty: 12 TABLET | Refills: 0 | Status: SHIPPED | OUTPATIENT
Start: 2023-04-12 | End: 2023-04-15

## 2023-04-12 RX ORDER — ORPHENADRINE CITRATE 30 MG/ML
60 INJECTION INTRAMUSCULAR; INTRAVENOUS ONCE
Status: COMPLETED | OUTPATIENT
Start: 2023-04-12 | End: 2023-04-12

## 2023-04-12 RX ORDER — PREDNISONE 50 MG/1
50 TABLET ORAL DAILY
Qty: 5 TABLET | Refills: 0 | Status: SHIPPED | OUTPATIENT
Start: 2023-04-12 | End: 2023-04-17

## 2023-04-12 RX ORDER — PREDNISONE 20 MG/1
60 TABLET ORAL ONCE
Status: COMPLETED | OUTPATIENT
Start: 2023-04-12 | End: 2023-04-12

## 2023-04-12 RX ORDER — OXYCODONE HYDROCHLORIDE AND ACETAMINOPHEN 5; 325 MG/1; MG/1
1 TABLET ORAL ONCE
Status: COMPLETED | OUTPATIENT
Start: 2023-04-12 | End: 2023-04-12

## 2023-04-12 RX ORDER — MORPHINE SULFATE 4 MG/ML
4 INJECTION, SOLUTION INTRAMUSCULAR; INTRAVENOUS ONCE
Status: COMPLETED | OUTPATIENT
Start: 2023-04-12 | End: 2023-04-12

## 2023-04-12 RX ADMIN — OXYCODONE HYDROCHLORIDE AND ACETAMINOPHEN 1 TABLET: 5; 325 TABLET ORAL at 20:00

## 2023-04-12 RX ADMIN — MORPHINE SULFATE 4 MG: 4 INJECTION, SOLUTION INTRAMUSCULAR; INTRAVENOUS at 19:01

## 2023-04-12 RX ADMIN — ORPHENADRINE CITRATE 60 MG: 30 INJECTION INTRAMUSCULAR; INTRAVENOUS at 19:17

## 2023-04-12 RX ADMIN — PREDNISONE 60 MG: 20 TABLET ORAL at 19:01

## 2023-04-12 ASSESSMENT — PAIN DESCRIPTION - LOCATION
LOCATION: BACK

## 2023-04-12 ASSESSMENT — PAIN - FUNCTIONAL ASSESSMENT: PAIN_FUNCTIONAL_ASSESSMENT: 0-10

## 2023-04-12 ASSESSMENT — PAIN SCALES - GENERAL
PAINLEVEL_OUTOF10: 9
PAINLEVEL_OUTOF10: 10
PAINLEVEL_OUTOF10: 9
PAINLEVEL_OUTOF10: 5
PAINLEVEL_OUTOF10: 10

## 2023-04-12 ASSESSMENT — PAIN DESCRIPTION - DESCRIPTORS: DESCRIPTORS: SHARP

## 2023-04-12 ASSESSMENT — PAIN DESCRIPTION - ORIENTATION: ORIENTATION: LOWER

## 2023-04-12 NOTE — ED PROVIDER NOTES
[facility] ED  eMERGENCY dEPARTMENT eNCOUnter      Pt Name: Rasheeda Atkins  MRN: [id number]  [place] 1990  Date of evaluation: 4/12/2023  Provider: Madonna Dozier PA-C    CHIEF COMPLAINT       Chief Complaint   Patient presents with    Back Pain     Chronic, states has been going on for 2 days, numbness down bilat legs, has been taking Tylenol, Percocet, and Tizanidine without relief, recent Covid and PE           HISTORY OF PRESENT ILLNESS  (Location/Symptom, Timing/Onset, Context/Setting, Quality, Duration, Modifying Factors, Severity.)   Rasheeda Atkins is a 34 y.o. female who presents to the emergency department complaining of axial low back pain on the left and right for the past 2 days. States that at times she does get sciatic symptoms on her left leg but mainly pain is in her axial back. Patient has a history of chronic back pain. No loss of bowel or bladder control, no numbness or tingling  Patient denies any history of IV drug use, denies any fevers, chills, abdominal pain nausea or vomiting    Location/Symptom: lumbar back  Quality: Aching  Duration: Persistent  Modifying Factors: Worse with bending and twisting  Severity: Mild    Nursing Notes were reviewed. REVIEW OF SYSTEMS    (2-9 systems for level 4, 10 or more for level 5)     Review of Systems   Constitutional: Negative. Respiratory: Negative. Cardiovascular: Negative. Gastrointestinal: Negative. Musculoskeletal: Complaining of back pain  Genitourinary: Negative. Skin: Negative. Neurological: Negative. Except as noted above the remainder of the review of systems was reviewed and negative.        PAST MEDICAL HISTORY         Diagnosis Date    Anemia     GERD (gastroesophageal reflux disease)     Kidney stones     Postpartum depression      None otherwise stated in nurses notes    SURGICAL HISTORY           Procedure Laterality Date    EXTERNAL EAR SURGERY Left     LITHOTRIPSY Right 08/25/2020

## 2023-04-28 ENCOUNTER — HOSPITAL ENCOUNTER (OUTPATIENT)
Dept: PHYSICAL THERAPY | Age: 33
Setting detail: THERAPIES SERIES
Discharge: HOME | End: 2023-04-28

## 2023-05-01 ENCOUNTER — APPOINTMENT (OUTPATIENT)
Dept: PHYSICAL THERAPY | Age: 33
End: 2023-05-01
Payer: COMMERCIAL

## 2023-05-04 ENCOUNTER — APPOINTMENT (OUTPATIENT)
Dept: PHYSICAL THERAPY | Age: 33
End: 2023-05-04
Payer: COMMERCIAL

## 2023-05-08 ENCOUNTER — HOSPITAL ENCOUNTER (OUTPATIENT)
Dept: PHYSICAL THERAPY | Age: 33
Setting detail: THERAPIES SERIES
Discharge: HOME | End: 2023-05-08
Payer: COMMERCIAL

## 2023-05-08 PROCEDURE — 97113 AQUATIC THERAPY/EXERCISES: CPT

## 2023-05-08 NOTE — PROGRESS NOTES
entering the water added pressure to her back. Will continue to progress as tolerated. Added HS and piriformis stretch to HEP this date. No change in pain level following tx. Activity Tolerance  Activity Tolerance: Patient limited by pain, Patient limited by fatigue    Patient Education  Patient Education: Initiated aquatic therapy this date with instruction on safety protocol and expectations. Pt verbalized/demonstrated good understanding:     [x] Yes         [] No, pt required further clarification. Post Treatment Pain:  5/10      Plan  Plan Frequency: 2  Plan weeks: 6       Goals  (Total # of Visits to Date: 2)      Short Term Goals  Time Frame for Short Term Goals: 2 weeks  Short Term Goal 1: Patient will be initiated with a HEP  Short Term Goal 2: Patient will tolerate 30 minutes of aquatic exercise to improve core strength in an unloaded environment. Long Term Goals  Time Frame for Long Term Goals : 4 weeks  Long Term Goal 1: Patient will be independent and compliant with a HEP  Long Term Goal 2: Patient will improve lumbar spine ROM to [facility] and without pain for ADLs. Long Term Goal 3: Patient will improve bilateral hip strength to >/= 4/5 in all planes for proper lifting techniques. Long Term Goal 4: Patient will ambulate without lateral shift. Long Term Goal 5: Patient will report 70% improvement in overall symptoms and function.     Minutes Tracking:  Time In: 9568  Time Out: Αμαλίας 28  Minutes: 39  Timed Code Treatment Minutes: [address] [place] [id number]     Date: 5/8/2023

## 2023-05-10 ENCOUNTER — HOSPITAL ENCOUNTER (EMERGENCY)
Age: 33
Discharge: HOME | End: 2023-05-10
Admitting: EMERGENCY MEDICINE
Payer: COMMERCIAL

## 2023-05-10 VITALS
BODY MASS INDEX: 25.69 KG/M2 | RESPIRATION RATE: 16 BRPM | HEART RATE: 103 BPM | TEMPERATURE: 97.7 F | WEIGHT: 145 LBS | SYSTOLIC BLOOD PRESSURE: 144 MMHG | DIASTOLIC BLOOD PRESSURE: 108 MMHG | OXYGEN SATURATION: 99 %

## 2023-05-10 DIAGNOSIS — M54.50 ACUTE EXACERBATION OF CHRONIC LOW BACK PAIN: Primary | ICD-10-CM

## 2023-05-10 DIAGNOSIS — G89.29 ACUTE EXACERBATION OF CHRONIC LOW BACK PAIN: Primary | ICD-10-CM

## 2023-05-10 PROCEDURE — 99283 EMERGENCY DEPT VISIT LOW MDM: CPT

## 2023-05-10 PROCEDURE — 6370000000 HC RX 637 (ALT 250 FOR IP): Performed by: PHYSICIAN ASSISTANT

## 2023-05-10 RX ORDER — TRAMADOL HYDROCHLORIDE 50 MG/1
50 TABLET ORAL ONCE
Status: COMPLETED | OUTPATIENT
Start: 2023-05-10 | End: 2023-05-10

## 2023-05-10 RX ADMIN — TRAMADOL HYDROCHLORIDE 50 MG: 50 TABLET, COATED ORAL at 21:31

## 2023-05-10 RX ADMIN — TRAMADOL HYDROCHLORIDE 50 MG: 50 TABLET, COATED ORAL at 21:07

## 2023-05-10 ASSESSMENT — LIFESTYLE VARIABLES
HOW OFTEN DO YOU HAVE A DRINK CONTAINING ALCOHOL: NEVER
HOW MANY STANDARD DRINKS CONTAINING ALCOHOL DO YOU HAVE ON A TYPICAL DAY: PATIENT DOES NOT DRINK

## 2023-05-10 ASSESSMENT — PAIN SCALES - GENERAL
PAINLEVEL_OUTOF10: 8
PAINLEVEL_OUTOF10: 8

## 2023-05-10 ASSESSMENT — ENCOUNTER SYMPTOMS
RESPIRATORY NEGATIVE: 1
BACK PAIN: 1
GASTROINTESTINAL NEGATIVE: 1

## 2023-05-11 ENCOUNTER — HOSPITAL ENCOUNTER (OUTPATIENT)
Dept: PHYSICAL THERAPY | Age: 33
Setting detail: THERAPIES SERIES
Discharge: HOME | End: 2023-05-11
Payer: COMMERCIAL

## 2023-05-11 PROCEDURE — 97113 AQUATIC THERAPY/EXERCISES: CPT

## 2023-05-11 NOTE — DISCHARGE INSTRUCTIONS
Follow-up with your orthopedic doctor. I encourage you to make an appointment as soon as you can. Avoid movements that worsen your pain. Take Tylenol as directed for discomfort. Promptly return to emergency department for new, changing, worsening of symptoms or other concerns.

## 2023-05-11 NOTE — ED PROVIDER NOTES
[facility]  EMERGENCY DEPARTMENT ENCOUNTER      Pt Name: Chris Murray  MRN: [id number]  [place] 1990  Date of evaluation: 5/10/2023  Provider: Eveline Tiwari PA-C    [address] [place] [address]       Chief Complaint   Patient presents with    Back Pain     Chronic back pain states she ran out of tramadol          HISTORY OF PRESENT ILLNESS   (Location/Symptom, Timing/Onset, Context/Setting, Quality, Duration, Modifying Factors, Severity)  Note limiting factors. Chris Murray is a 34 y.o. female who presents to the emergency department [facility] chronic lower back pain reports 1.5-year history of lower back pain noting she had pool therapy this past Monday woke Tuesday morning with worsening of right-sided lower back pain sharp stabbing worsened with movement that sends pain into the back of her right leg. Patient reports twisting makes it worse. Patient reports she takes Ultram daily but has run out of pain medicine which was prescribed by orthopedic doctor. Patient admits that orthopedic doctor will not prescribe any more medicine until she is reevaluated. Patient denies fall, trauma, lumbar radicular focal weakness focal numbness changes to bowel or bladder function saddle anesthesias urinary symptoms history of fever or abdominal pain. Patient endorses she knows his chronic back pain well. No other symptoms noted, patient has no additional pain sites or complaints. HPI    Nursing Notes were reviewed. REVIEW OF SYSTEMS    (2-9 systems for level 4, 10 or more for level 5)     Review of Systems   Constitutional: Negative. Negative for fever. Respiratory: Negative. Cardiovascular: Negative. Gastrointestinal: Negative. Genitourinary: Negative. Musculoskeletal:  Positive for back pain. See hpi   Skin: Negative. Neurological: Negative. Psychiatric/Behavioral: Negative. Except as noted above the remainder of the review of systems was reviewed and negative.

## 2023-05-11 NOTE — PROGRESS NOTES
Phone: Sudeep           Fax: [phone]                           Outpatient Physical Therapy                                                                            Daily Note    Patient: Jean Marie Rivera : 1990  CSN #: [id number]   Referring Physician: Tracie Nicole MD  Date: 2023    Diagnosis: Lumbar degenerative disc disease, M51.36, facet arthropathy, M47.816, radiculopathy of lumbar region, M54.16  Treatment Diagnosis: LBP with radiculopathy    Onset Date: 23  PT Insurance Information: [address]  Total # of Visits Approved: 8 Per Physician Order  Total # of Visits to Date: 3  No Show: 0  Canceled Appointment: 0      Pre-Treatment Pain:  7/10  Subjective: Pt reports current R LBP and through the hip 7/10, numbness down to R thigh. Intermittent sharp shooting pain on R side with movement. Pt reports ever since Monday night she has been in increasing pain and ended up going to the ER last night. Pt was given Tramadol at the ER and encouraged to contact PCP. Which patient reports she had made contact with PCP and ortho prior to going to the ER and was advised to go to the ER. Pt has a follow up with PCP on 23.     Exercises:  Exercise 1: R side glides (radicular symptoms down R LE at 5th rep)  Exercise 2: aq: offloaded enviornment to reduce compressive forces: HEP: HS stretch and piriformis stretch  Exercise 3: aq: Semi deep water walking 3 laps each fwd, bwd and lateral without UE support  Exercise 4: aq: Semi deep sink therex x 10 B LE  Exercise 5: aq: Seated jet massage during stretch to reduce muscle tension--piriformis and HS stretch 30\" x 3, nerve glide B LE 10x2    Assessment  Body Structures, Functions, Activity Limitations Requiring Skilled Therapeutic Intervention: Decreased functional mobility , Decreased ROM, Decreased strength, Decreased tolerance to work activity, Decreased endurance, Decreased

## 2023-05-15 ENCOUNTER — HOSPITAL ENCOUNTER (OUTPATIENT)
Dept: PHYSICAL THERAPY | Age: 33
Setting detail: THERAPIES SERIES
Discharge: HOME | End: 2023-05-15
Payer: COMMERCIAL

## 2023-05-15 PROCEDURE — 97113 AQUATIC THERAPY/EXERCISES: CPT

## 2023-05-15 NOTE — PROGRESS NOTES
Phone: Sudeep           Fax: [phone]                           Outpatient Physical Therapy                                                                            Daily Note    Patient: Genia Livingston : 1990  CSN #: [id number]   Referring Physician: Yesenia Yin MD  Date: 5/15/2023    Diagnosis: Lumbar degenerative disc disease, M51.36, facet arthropathy, M47.816, radiculopathy of lumbar region, M54.16  Treatment Diagnosis: LBP with radiculopathy    Onset Date: 23  PT Insurance Information: [address]  Total # of Visits Approved: 8 Per Physician Order  Total # of Visits to Date: 4  No Show: 0  Canceled Appointment: 0      Pre-Treatment Pain:  7/10  Subjective: Pt reports 7/10 R LBP/ \"middle\" back pain this date. Pt reports pain is going around bilateral hips, but denied any s/s down the legs. 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 Pt did report having R back pain that extended upward yesterday, but has since alleviated. Exercises:  Exercise 1: R side glides (radicular symptoms down R LE at 5th rep). Exercise 2: aq: offloaded enviornment to reduce compressive forces: HEP: HS stretch and piriformis stretch  Exercise 3: aq: Semi deep water walking 3 laps each fwd, bwd and lateral without UE support--shallow this date  Exercise 4: aq: Shallow sink therex x 10 B LE, march to ER/IR x 10, hip circles fwd and bwd x 10 each.   Exercise 5: aq: Seated jet massage during stretch to reduce muscle tension--piriformis and HS stretch 30\" x 3, nerve glide B LE 10x2    Assessment  Body Structures, Functions, Activity Limitations Requiring Skilled Therapeutic Intervention: Decreased functional mobility , Decreased ROM, Decreased strength, Decreased tolerance to work activity, Decreased endurance, Decreased posture, Increased pain  Assessment: B hip add 3+/5, B hip abd 4-/5, R hip

## 2023-05-18 ENCOUNTER — HOSPITAL ENCOUNTER (OUTPATIENT)
Dept: PHYSICAL THERAPY | Age: 33
Setting detail: THERAPIES SERIES
Discharge: HOME | End: 2023-05-18
Payer: COMMERCIAL

## 2023-05-18 PROCEDURE — 97113 AQUATIC THERAPY/EXERCISES: CPT

## 2023-05-18 NOTE — PROGRESS NOTES
Phone: Sudeep           Fax: [phone]                           Outpatient Physical Therapy                                                                            Daily Note    Patient: Carlos Garza : 1990  CSN #: [id number]   Referring Physician: Albert Blake MD  Date: 2023    Diagnosis: Lumbar degenerative disc disease, M51.36, facet arthropathy, M47.816, radiculopathy of lumbar region, M54.16  Treatment Diagnosis: LBP with radiculopathy    Onset Date: 23  PT Insurance Information: [address]  Total # of Visits Approved: 8 Per Physician Order  Total # of Visits to Date: 5  No Show: 0  Canceled Appointment: 0      Pre-Treatment Pain:  4/10  Subjective: pt 5 minutes late to appointment this date. pt reports current R LBP 4/10, denied any pain in hips or down either LE. Exercises:  Exercise 1: R side glides (radicular symptoms down R LE at 5th rep). Exercise 2: aq: offloaded enviornment to reduce compressive forces: HEP: HS stretch and piriformis stretch  Exercise 3: aq: Semi deep water walking 3 laps each fwd, bwd and lateral without UE support  Exercise 4: aq: Shallow sink therex x 15 B LE, march to ER/IR x 15, hip circles fwd and bwd x 15 each. Exercise 5: aq: Seated jet massage during stretch to reduce muscle tension--piriformis and HS stretch 30\" x 3, nerve glide B LE 10x2  Exercise 6: aq: Semi deep single dumbbells: 90/90, chest fly--x10  Exercise 7: aq: Shallow power walk with green handbells x 3 laps. Assessment  Body Structures, Functions, Activity Limitations Requiring Skilled Therapeutic Intervention: Decreased functional mobility , Decreased ROM, Decreased strength, Decreased tolerance to work activity, Decreased endurance, Decreased posture, Increased pain  Assessment: Continued to progress aquatic routine this date with gentle LE, UE and core activities.  Initiated progressive walking and upper

## 2023-05-22 ENCOUNTER — HOSPITAL ENCOUNTER (OUTPATIENT)
Dept: PHYSICAL THERAPY | Age: 33
Setting detail: THERAPIES SERIES
Discharge: HOME | End: 2023-05-22
Payer: COMMERCIAL

## 2023-05-22 PROCEDURE — 97113 AQUATIC THERAPY/EXERCISES: CPT

## 2023-05-24 ENCOUNTER — HOSPITAL ENCOUNTER (OUTPATIENT)
Dept: NON INVASIVE DIAGNOSTICS | Age: 33
Discharge: HOME | End: 2023-05-24
Payer: COMMERCIAL

## 2023-05-24 DIAGNOSIS — I26.99 ACUTE PULMONARY EMBOLISM WITHOUT ACUTE COR PULMONALE, UNSPECIFIED PULMONARY EMBOLISM TYPE (HCC): ICD-10-CM

## 2023-05-24 LAB
LV EF: 65 %
LVEF MODALITY: NORMAL

## 2023-05-24 PROCEDURE — 93306 TTE W/DOPPLER COMPLETE: CPT

## 2023-05-25 ENCOUNTER — HOSPITAL ENCOUNTER (OUTPATIENT)
Dept: PHYSICAL THERAPY | Age: 33
Setting detail: THERAPIES SERIES
Discharge: HOME | End: 2023-05-25
Payer: COMMERCIAL

## 2023-05-25 PROCEDURE — 97113 AQUATIC THERAPY/EXERCISES: CPT

## 2023-05-25 NOTE — PROGRESS NOTES
Phone: Sudeep           Fax: [phone]                           Outpatient Physical Therapy                                                                            Daily Note    Patient: Oral : 1990  CSN #: [id number]   Referring Physician: Lianne Beavers MD  Date: 2023    Diagnosis: Lumbar degenerative disc disease, M51.36, facet arthropathy, M47.816, radiculopathy of lumbar region, M54.16  Treatment Diagnosis: LBP with radiculopathy    Onset Date: 23  PT Insurance Information: [address] [facility]  Total # of Visits Approved: 8 Per Physician Order  Total # of Visits to Date: 7  No Show: 0  Canceled Appointment: 0      Pre-Treatment Pain:  6/10  Subjective: pt reports current central LBP 6/10. pt did have increased R buttock pain yesterday but was able to get it to relax with some stretching/massage. pt reports the past two days have been a little more painful. Exercises:  Exercise 1: R side glides (radicular symptoms down R LE at 5th rep). Exercise 2: aq: offloaded enviornment to reduce compressive forces: HEP: HS stretch and piriformis stretch  Exercise 3: aq: Shallow water walking 3 laps each fwd, bwd and lateral without UE support  Exercise 4: aq: Shallow sink therex x 15 B LE, march to ER/IR x 15, hip circles fwd and bwd x 15 each--semi deep this date d/t increased pain. Exercise 5: aq: Seated jet massage during stretch to reduce muscle tension--piriformis and HS stretch 30\" x 3, nerve glide B LE 10x2  Exercise 6: aq: Semi deep single dumbbells: 90/90, chest fly--x15. Stir the pot CW and CCW, figure 8 x 10 each  Exercise 7: aq: Shallow power walk with green handbells x 3 laps. Exercise 8: aq: resisted marches and hip abduction x 15 shallow. Exercise 10: aq:  B LE lunges x 10 each shallow    Assessment  Body Structures, Functions, Activity Limitations Requiring Skilled Therapeutic Intervention: Decreased

## 2023-05-30 ENCOUNTER — HOSPITAL ENCOUNTER (OUTPATIENT)
Dept: PHYSICAL THERAPY | Age: 33
Setting detail: THERAPIES SERIES
Discharge: HOME | End: 2023-05-30
Payer: COMMERCIAL

## 2023-05-30 PROCEDURE — 97113 AQUATIC THERAPY/EXERCISES: CPT

## 2023-05-30 NOTE — PROGRESS NOTES
with green handbells x 3 laps. Exercise 8: aq: resisted marches and hip abduction x 15 shallow. Exercise 9: aq: rows/ext with BTB x 10  Exercise 10: aq: B LE lunges x 10 each shallow    Assessment  Body Structures, Functions, Activity Limitations Requiring Skilled Therapeutic Intervention: Decreased functional mobility , Decreased ROM, Decreased strength, Decreased tolerance to work activity, Decreased endurance, Decreased posture, Increased pain  Assessment: Continued to progress aquatic routine this date with gentle LE, UE and core activities. Continued with progressive walking and upper body therex to greater challenge core stability. Pt tolerated tx well with 1 short rest break. pt reported 3/10 L LBP and 5/10 R LBP and dull ache in R hip following tx. Will continue to progress as tolerated. Activity Tolerance  Activity Tolerance: Patient tolerated treatment well, Patient limited by pain    Patient Education  Patient Education: Exercise progression/rationale. Continued HEP. Pt verbalized/demonstrated good understanding:     [x] Yes         [] No, pt required further clarification. Post Treatment Pain:  5/10 R low back and hip, 3/10 L LBP      Plan  Plan Frequency: 2  Plan weeks: 6       Goals  (Total # of Visits to Date: 8)      Short Term Goals  Time Frame for Short Term Goals: 2 weeks  Short Term Goal 1: Patient will be initiated with a HEP--met  Short Term Goal 2: Patient will tolerate 30 minutes of aquatic exercise to improve core strength in an unloaded environment--met    Long Term Goals  Time Frame for Long Term Goals : 4 weeks  Long Term Goal 1: Patient will be independent and compliant with a HEP  Long Term Goal 2: Patient will improve [facility] and without pain for ADLs. Long Term Goal 3: Patient will improve bilateral hip strength to >/= 4/5 in all planes for proper lifting techniques.   Long Term Goal 4: Patient will ambulate without lateral shift--progressing  Long Term Goal

## 2023-06-01 ENCOUNTER — HOSPITAL ENCOUNTER (OUTPATIENT)
Dept: PHYSICAL THERAPY | Age: 33
Setting detail: THERAPIES SERIES
Discharge: HOME | End: 2023-06-01
Payer: COMMERCIAL

## 2023-06-01 PROCEDURE — 97113 AQUATIC THERAPY/EXERCISES: CPT

## 2023-06-01 NOTE — PROGRESS NOTES
Phone: Sudeep           Fax: [phone]                           Outpatient Physical Therapy                                                                            Daily Note    Patient: Dennis Edwards : 1990  CSN #: [id number]   Referring Physician: Neville Ross MD  Date: 2023    Diagnosis: Lumbar degenerative disc disease, M51.36, facet arthropathy, M47.816, radiculopathy of lumbar region, M54.16  Treatment Diagnosis: LBP with radiculopathy    Onset Date: 23  PT Insurance Information: [address] [employer]  Total # of Visits Approved: 8 Per Physician Order  Total # of Visits to Date: 9  No Show: 0  Canceled Appointment: 0      Pre-Treatment Pain:   Subjective: pt reports current R LBP /10, L LBP /10. No radicular sxs to report prior to treatment. No concerns reported regarding previous tx. Exercises:  Exercise 1: R side glides (radicular symptoms down R LE at 5th rep). Exercise 2: aq: offloaded enviornment to reduce compressive forces: HEP: HS stretch and piriformis stretch  Exercise 3: aq: Shallow water walking 3 laps each fwd, bwd and lateral without UE support  Exercise 4: aq: Shallow sink therex x 15 B LE, march to ER/IR x 15, hip circles fwd and bwd x 15 each  Exercise 5: aq: Seated jet massage during stretch to reduce muscle tension--piriformis and HS stretch 30\" x 3, nerve glide B LE 10x2  Exercise 6: aq: Semi deep single dumbbells: 90/90, chest fly--x15. Stir the pot CW and CCW, figure 8 x 10 each  Exercise 7: aq: Shallow power walk with green handbells x 3 laps--semi deep this date  Exercise 8: aq: resisted marches and hip abduction x 15 shallow. Exercise 9: aq: rows/ext with GTB x 10  Exercise 10: aq:  B LE lunges x 10 each shallow    Assessment  Body Structures, Functions, Activity Limitations Requiring Skilled Therapeutic Intervention: Decreased functional mobility , Decreased ROM, Decreased strength,

## 2023-06-05 ENCOUNTER — HOSPITAL ENCOUNTER (OUTPATIENT)
Dept: PHYSICAL THERAPY | Age: 33
Setting detail: THERAPIES SERIES
Discharge: HOME | End: 2023-06-05
Payer: COMMERCIAL

## 2023-06-05 PROCEDURE — 97113 AQUATIC THERAPY/EXERCISES: CPT

## 2023-06-05 NOTE — PROGRESS NOTES
spine ROM to [facility] and without pain for ADLs. Long Term Goal 4: Patient will ambulate without lateral shift--progressing  Long Term Goal 5: Patient will report 70% improvement in overall symptoms and function.     Minutes Tracking:  Time In: 1024  Time Out: 1113  Minutes: 49  Timed Code Treatment Minutes: 1303 Randy Bailon 26     Date: 6/5/2023

## 2023-06-08 ENCOUNTER — HOSPITAL ENCOUNTER (OUTPATIENT)
Dept: PHYSICAL THERAPY | Age: 33
Setting detail: THERAPIES SERIES
Discharge: HOME | End: 2023-06-08
Payer: COMMERCIAL

## 2023-06-08 PROCEDURE — 97113 AQUATIC THERAPY/EXERCISES: CPT

## 2023-06-08 NOTE — PROGRESS NOTES
Goal 5: Patient will report 70% improvement in overall symptoms and function.     Minutes Tracking:  Time In: 1021  Time Out: 1113  Minutes: 52  Timed Code Treatment Minutes: Beto 86, Randy 26     Date: 6/8/2023

## 2023-06-22 ENCOUNTER — OFFICE VISIT (OUTPATIENT)
Dept: PULMONOLOGY | Age: 33
End: 2023-06-22
Payer: COMMERCIAL

## 2023-06-22 DIAGNOSIS — I26.99 BILATERAL PULMONARY EMBOLISM (HCC): Primary | ICD-10-CM

## 2023-06-22 PROCEDURE — G8427 DOCREV CUR MEDS BY ELIG CLIN: HCPCS | Performed by: INTERNAL MEDICINE

## 2023-06-22 PROCEDURE — 99214 OFFICE O/P EST MOD 30 MIN: CPT | Performed by: INTERNAL MEDICINE

## 2023-06-22 RX ORDER — TRAMADOL HYDROCHLORIDE 50 MG/1
TABLET ORAL
COMMUNITY
Start: 2023-06-13

## 2023-06-22 RX ORDER — GABAPENTIN 600 MG/1
600 TABLET ORAL 3 TIMES DAILY
COMMUNITY
Start: 2023-05-23

## 2023-06-22 NOTE — PROGRESS NOTES
OUTPATIENT PULMONARY CONSULT NOTE      Patient:  Eliecer Lazaro  MRN: [id number]    Consulting Physician: FÁTIMA Esquivel CNP  Reason for Consult: Recent pulm embolism  [facility] Physician: FÁTIMA Esquivel CNP    HISTORY OF PRESENT ILLNESS:     Today's appointment is for follow-up of history of pulm embolism. Since he was seen last time she has been taking Xarelto. She is tolerating Xarelto well. Does not complain of any bleeding issues. Does not complain of any shortness of breath dizziness denies chest pain denies leg swelling edema does not complain of cough fever chest pain hemoptysis and denies nocturnal symptoms. According to patient she is doing her normal activities at work she works as [place] and [employer]. She has been feeling good and denies much symptoms. An echocardiogram was ordered last time when she was seen to determine right heart function or any evidence of pulmonary hypertension echocardiogram was normal done on 05/24/2023 and showed normal LV function and normal RV size and function. Initial office visit and evaluation on 03/30/2023  The patient is a 34 y.o. female with no significant past medical history. Patient was 17 weeks pregnant when she presented to [facility] apparently late February and was transferred to [facility] she had miscarriage and then started having postpartum bleeding with clots apparently requiring transfusion had underwent emergent D&C there ultimately was discharged from there. She presented to emergency room in [facility] and at that time was having feeling of chills tachycardia shortness of breath having some epigastric pain chest tightness. She was seen in the emergency room she had a CTA chest done which according to radiology right lower lobe subsegmental and left lower lobe segmental septal filling defect according to radiology suspicious for pulm emboli.   Patient was admitted to hospital she was

## 2023-07-11 ENCOUNTER — TELEPHONE (OUTPATIENT)
Dept: UROLOGY | Age: 33
End: 2023-07-11

## 2023-07-11 NOTE — TELEPHONE ENCOUNTER
Patient called reporting that she is having increasing worse right side flank pain. She rates her pain \"5\". Denies fever, nausea, or gross hematuria. Says she took a tramadol last at 2 am (medication she had on hand). Last seen in the office 11/2021.

## 2023-07-15 ENCOUNTER — HOSPITAL ENCOUNTER (EMERGENCY)
Age: 33
Discharge: HOME | End: 2023-07-15
Attending: EMERGENCY MEDICINE
Payer: COMMERCIAL

## 2023-07-15 VITALS
WEIGHT: 149 LBS | TEMPERATURE: 98.9 F | HEIGHT: 63 IN | SYSTOLIC BLOOD PRESSURE: 118 MMHG | HEART RATE: 101 BPM | BODY MASS INDEX: 26.4 KG/M2 | DIASTOLIC BLOOD PRESSURE: 82 MMHG | RESPIRATION RATE: 20 BRPM | OXYGEN SATURATION: 100 %

## 2023-07-15 DIAGNOSIS — M54.31 SCIATICA OF RIGHT SIDE: Primary | ICD-10-CM

## 2023-07-15 PROCEDURE — 99283 EMERGENCY DEPT VISIT LOW MDM: CPT

## 2023-07-15 PROCEDURE — 6370000000 HC RX 637 (ALT 250 FOR IP): Performed by: EMERGENCY MEDICINE

## 2023-07-15 RX ORDER — PREDNISONE 20 MG/1
40 TABLET ORAL ONCE
Status: COMPLETED | OUTPATIENT
Start: 2023-07-15 | End: 2023-07-15

## 2023-07-15 RX ORDER — OXYCODONE HYDROCHLORIDE AND ACETAMINOPHEN 5; 325 MG/1; MG/1
1 TABLET ORAL ONCE
Status: COMPLETED | OUTPATIENT
Start: 2023-07-15 | End: 2023-07-15

## 2023-07-15 RX ORDER — LIDOCAINE 50 MG/G
1 PATCH TOPICAL DAILY
Qty: 10 PATCH | Refills: 0 | Status: SHIPPED | OUTPATIENT
Start: 2023-07-15 | End: 2023-07-25

## 2023-07-15 RX ORDER — PREDNISONE 20 MG/1
40 TABLET ORAL DAILY
Qty: 8 TABLET | Refills: 0 | Status: SHIPPED | OUTPATIENT
Start: 2023-07-16 | End: 2023-07-20

## 2023-07-15 RX ORDER — LIDOCAINE 4 G/G
1 PATCH TOPICAL DAILY
Status: DISCONTINUED | OUTPATIENT
Start: 2023-07-15 | End: 2023-07-15 | Stop reason: HOSPADM

## 2023-07-15 RX ADMIN — OXYCODONE HYDROCHLORIDE AND ACETAMINOPHEN 1 TABLET: 5; 325 TABLET ORAL at 19:35

## 2023-07-15 RX ADMIN — PREDNISONE 40 MG: 20 TABLET ORAL at 19:34

## 2023-07-15 ASSESSMENT — PAIN DESCRIPTION - ORIENTATION: ORIENTATION: LOWER

## 2023-07-15 ASSESSMENT — PAIN - FUNCTIONAL ASSESSMENT
PAIN_FUNCTIONAL_ASSESSMENT: PREVENTS OR INTERFERES SOME ACTIVE ACTIVITIES AND ADLS
PAIN_FUNCTIONAL_ASSESSMENT: 0-10

## 2023-07-15 ASSESSMENT — PAIN DESCRIPTION - LOCATION: LOCATION: BACK

## 2023-07-15 ASSESSMENT — PAIN SCALES - GENERAL: PAINLEVEL_OUTOF10: 9

## 2023-07-15 NOTE — DISCHARGE INSTRUCTIONS
Return to the emergency department if you develop changes in strength, changes in your ability to control bowel or bladder function, difficulty walking or any other concerns. I have given you referral to an additional pain management specialist.  Feel free to contact them to arrange an appointment that may be sooner than your currently scheduled appointment. Use caution while taking Percocet  (oxycodone). It may cause drowsiness. Do not drive or perform dangerous activity while taking this medication and do not take with alcohol or other sedatives.

## 2023-07-15 NOTE — ED PROVIDER NOTES
[facility]      Pt Name: Rosalva Caro  MRN: [id number]  [address] 1990  Date of evaluation: 7/15/2023  Provider: Zurdo Peraza MD    CHIEF COMPLAINT       Chief Complaint   Patient presents with    Back Pain     Pt stated she just had MRI and found out she had herniated discs of L5 and S1. States she does see pain management until august and the pain has become unbearable, is accompanied with nausea today. HISTORY OF PRESENT ILLNESS      Rosalva Caro is a 34 y.o. female who presents to the emergency department for evaluation of back pain. States that she has a history of chronic back pain with intermittent flares of pain. Had an MRI performed in June of this year at [facility] which reveals disc herniation at L5 and S1. States that she is scheduled to see pain management but cannot do so until August.  Has had intermittent episodes of pain over the last few weeks but has had worsened pain since yesterday. She notes pain now radiates down the right leg with some \"tingling\" in the right leg. She has had symptoms like this in the past.    Denies any recent trauma. No change in bowel or bladder function. No reported fever. No abdominal pain complaints. Denies possibility of pregnancy. No other complaints at this time. Patient is anticoagulated on Xarelto for history of PE diagnosed earlier this year.       PAST MEDICAL HISTORY     Past Medical History:   Diagnosis Date    Anemia     GERD (gastroesophageal reflux disease)     Kidney stones     Postpartum depression          SURGICAL HISTORY       Past Surgical History:   Procedure Laterality Date    EXTERNAL EAR SURGERY Left     LITHOTRIPSY Right 08/25/2020    ESWL EXTRACORPOREAL SHOCK WAVE LITHOTRIPSY     LITHOTRIPSY Right 8/25/2020    ESWL EXTRACORPOREAL SHOCK WAVE LITHOTRIPSY performed by Kevin Farley MD at [address] [place] [address] Right 5/26/2016    right cysto HLL with stent

## 2023-07-17 ENCOUNTER — OFFICE VISIT (OUTPATIENT)
Dept: UROLOGY | Age: 33
End: 2023-07-17
Payer: COMMERCIAL

## 2023-07-17 ENCOUNTER — HOSPITAL ENCOUNTER (OUTPATIENT)
Dept: ULTRASOUND IMAGING | Age: 33
Discharge: HOME | End: 2023-07-19
Payer: COMMERCIAL

## 2023-07-17 ENCOUNTER — HOSPITAL ENCOUNTER (OUTPATIENT)
Age: 33
Discharge: HOME | End: 2023-07-19
Payer: COMMERCIAL

## 2023-07-17 ENCOUNTER — HOSPITAL ENCOUNTER (OUTPATIENT)
Age: 33
Setting detail: SPECIMEN
Discharge: HOME | End: 2023-07-17
Payer: COMMERCIAL

## 2023-07-17 ENCOUNTER — HOSPITAL ENCOUNTER (OUTPATIENT)
Dept: GENERAL RADIOLOGY | Age: 33
Discharge: HOME | End: 2023-07-19
Payer: COMMERCIAL

## 2023-07-17 VITALS
BODY MASS INDEX: 26.4 KG/M2 | WEIGHT: 149 LBS | HEIGHT: 63 IN | SYSTOLIC BLOOD PRESSURE: 118 MMHG | TEMPERATURE: 98.6 F | DIASTOLIC BLOOD PRESSURE: 80 MMHG

## 2023-07-17 DIAGNOSIS — N23 RENAL COLIC: ICD-10-CM

## 2023-07-17 DIAGNOSIS — R30.0 DYSURIA: ICD-10-CM

## 2023-07-17 DIAGNOSIS — N23 RENAL COLIC: Primary | ICD-10-CM

## 2023-07-17 LAB
BACTERIA URNS QL MICRO: ABNORMAL
BILIRUB UR QL STRIP: NEGATIVE
CLARITY UR: CLEAR
COLOR UR: YELLOW
EPI CELLS #/AREA URNS HPF: ABNORMAL /HPF (ref 0–25)
GLUCOSE UR STRIP-MCNC: NEGATIVE MG/DL
HGB UR QL STRIP.AUTO: NEGATIVE
KETONES UR STRIP-MCNC: NEGATIVE MG/DL
LEUKOCYTE ESTERASE UR QL STRIP: ABNORMAL
MUCOUS THREADS URNS QL MICRO: ABNORMAL
NITRITE UR QL STRIP: NEGATIVE
PH UR STRIP: 6 [PH] (ref 5–9)
PROT UR STRIP-MCNC: NEGATIVE MG/DL
RBC #/AREA URNS HPF: ABNORMAL /HPF (ref 0–2)
SP GR UR STRIP: >1.03 (ref 1.01–1.02)
UROBILINOGEN UR STRIP-ACNC: NORMAL EU/DL (ref 0–1)
WBC #/AREA URNS HPF: ABNORMAL /HPF (ref 0–5)

## 2023-07-17 PROCEDURE — 51798 US URINE CAPACITY MEASURE: CPT | Performed by: NURSE PRACTITIONER

## 2023-07-17 PROCEDURE — G8427 DOCREV CUR MEDS BY ELIG CLIN: HCPCS | Performed by: NURSE PRACTITIONER

## 2023-07-17 PROCEDURE — 81001 URINALYSIS AUTO W/SCOPE: CPT

## 2023-07-17 PROCEDURE — 1036F TOBACCO NON-USER: CPT | Performed by: NURSE PRACTITIONER

## 2023-07-17 PROCEDURE — G8419 CALC BMI OUT NRM PARAM NOF/U: HCPCS | Performed by: NURSE PRACTITIONER

## 2023-07-17 PROCEDURE — 76770 US EXAM ABDO BACK WALL COMP: CPT

## 2023-07-17 PROCEDURE — 99214 OFFICE O/P EST MOD 30 MIN: CPT | Performed by: NURSE PRACTITIONER

## 2023-07-17 PROCEDURE — 74018 RADEX ABDOMEN 1 VIEW: CPT

## 2023-07-17 PROCEDURE — 87086 URINE CULTURE/COLONY COUNT: CPT

## 2023-07-17 RX ORDER — TAMSULOSIN HYDROCHLORIDE 0.4 MG/1
0.4 CAPSULE ORAL DAILY
Qty: 30 CAPSULE | Refills: 0 | Status: SHIPPED | OUTPATIENT
Start: 2023-07-17

## 2023-07-17 ASSESSMENT — ENCOUNTER SYMPTOMS
SHORTNESS OF BREATH: 0
NAUSEA: 0
COLOR CHANGE: 0
COUGH: 0
EYE REDNESS: 0
APNEA: 0
VOMITING: 0
BACK PAIN: 0
CONSTIPATION: 0
WHEEZING: 0
ABDOMINAL PAIN: 1

## 2023-07-17 NOTE — PATIENT INSTRUCTIONS
SURVEY:    You may be receiving a survey from [employer] regarding your visit today. Please complete the survey to enable us to provide the highest quality of care to you and your family. If you cannot score us a very good on any question, please call the office to discuss how we could have made your experience a very good one. Thank you.

## 2023-07-17 NOTE — TELEPHONE ENCOUNTER
Thea Florence called in and left a voicemail that she went and picked up her Xarelto and she only received one pill. She needs a one month supply. She had a telephone visit on 6/22/23 with Dr. Saurav Ayers.  The prescription says dispense one pill. I spoke with Dr. Pee Perry who reviewed the chart and asked that we send a prescription to the pharmacy for Xarelto 20 mg take 1 tab daily, dispense 30 tabs with 1 refill. Rx sent.

## 2023-07-17 NOTE — PROGRESS NOTES
Bladderscan performed in office today:  Pt voided - 40 mL, PVR - 34 mL
FOR PAIN FOR 7 DAYS (Patient not taking: Reported on 7/15/2023)      albuterol sulfate HFA (PROVENTIL HFA) 108 (90 Base) MCG/ACT inhaler Inhale 2 puffs into the lungs every 4 hours as needed for Wheezing (Patient not taking: Reported on 6/22/2023) 18 g 1    rivaroxaban (XARELTO) 20 MG TABS tablet Take 1 tablet by mouth daily (with breakfast) for 1 dose 1 tablet 4     No facility-administered encounter medications on file as of 7/17/2023. Current Outpatient Medications on File Prior to Visit   Medication Sig Dispense Refill    predniSONE (DELTASONE) 20 MG tablet Take 2 tablets by mouth daily for 4 days 8 tablet 0    lidocaine (LIDODERM) 5 % Place 1 patch onto the skin daily for 10 days 12 hours on, 12 hours off. 10 patch 0    gabapentin (NEURONTIN) 600 MG tablet Take 1 tablet by mouth 3 times daily. tiZANidine (ZANAFLEX) 2 MG tablet Take 1 tablet by mouth 2 times daily as needed      traMADol (ULTRAM) 50 MG tablet TAKE 1 TABLET BY MOUTH EVERY 6 HOURS AS NEEDED FOR PAIN FOR 7 DAYS (Patient not taking: Reported on 7/15/2023)      albuterol sulfate HFA (PROVENTIL HFA) 108 (90 Base) MCG/ACT inhaler Inhale 2 puffs into the lungs every 4 hours as needed for Wheezing (Patient not taking: Reported on 6/22/2023) 18 g 1    rivaroxaban (XARELTO) 20 MG TABS tablet Take 1 tablet by mouth daily (with breakfast) for 1 dose 1 tablet 4     No current facility-administered medications on file prior to visit.      Cyclobenzaprine, Reglan [metoclopramide], and Norco [hydrocodone-acetaminophen]  Family History   Problem Relation Age of Onset    Ulcerative Colitis Father     Ulcerative Colitis Sister     Asthma Sister     Heart Disease Maternal Grandfather     Stroke Maternal Grandfather     High Blood Pressure Maternal Grandfather     Arthritis Maternal Grandmother     Vision Loss Maternal Grandmother      Social History     Tobacco Use   Smoking Status Never   Smokeless Tobacco Never       Social History     Substance and

## 2023-07-18 ENCOUNTER — TELEPHONE (OUTPATIENT)
Dept: UROLOGY | Age: 33
End: 2023-07-18

## 2023-07-18 LAB
MICROORGANISM SPEC CULT: NORMAL
SPECIMEN DESCRIPTION: NORMAL

## 2023-07-18 NOTE — TELEPHONE ENCOUNTER
----- Message from FÁTIMA Sanchez CNP sent at 7/18/2023  9:01 AM EDT -----  No stones on the right. Small stones on the left, but in the kidney. It looks like she has a lot of constipation on the right. Take miralax daily. Drink 80 ounces of water. Await urine results.  F/U in one year with [facility] prior

## 2023-07-18 NOTE — TELEPHONE ENCOUNTER
Writer called patient and advised her of results/response. Writer scheduled patient for 7/17/24. We will call her back with urine results. Patient voiced understanding.

## 2023-07-18 NOTE — TELEPHONE ENCOUNTER
Patient called today with concerns that [facility] stated they hadn't yet received the prescription for Xarelto. I called [address] [place] [address] to check on this; pharmacy tech stated they have the prescription but had to order Xarelto and medication should be back in stock tomorrow (7/19). I called Dave Dietrich back to inform her of this, and offered patient option of transferring prescription to another pharmacy with Xarelto in stock today. She stated she will wait to pick it up at [facility] tomorrow.

## 2023-07-19 ENCOUNTER — TELEPHONE (OUTPATIENT)
Dept: UROLOGY | Age: 33
End: 2023-07-19

## 2023-07-19 NOTE — TELEPHONE ENCOUNTER
----- Message from FÁTIMA Escobar - CNP sent at 7/19/2023  9:15 AM EDT -----  Call pt - urine cx reviewed and negative for UTI & for significant microhematuria

## 2023-08-09 ENCOUNTER — OFFICE VISIT (OUTPATIENT)
Age: 33
End: 2023-08-09
Payer: COMMERCIAL

## 2023-08-09 ENCOUNTER — TELEPHONE (OUTPATIENT)
Age: 33
End: 2023-08-09

## 2023-08-09 VITALS — WEIGHT: 152.7 LBS | OXYGEN SATURATION: 98 % | BODY MASS INDEX: 27.05 KG/M2 | HEIGHT: 63 IN | RESPIRATION RATE: 18 BRPM

## 2023-08-09 DIAGNOSIS — M54.16 LUMBAR RADICULOPATHY: Primary | ICD-10-CM

## 2023-08-09 DIAGNOSIS — M51.36 LUMBAR DEGENERATIVE DISC DISEASE: ICD-10-CM

## 2023-08-09 DIAGNOSIS — M47.817 LUMBOSACRAL SPONDYLOSIS WITHOUT MYELOPATHY: ICD-10-CM

## 2023-08-09 PROBLEM — O03.9 SPONTANEOUS ABORTION: Status: ACTIVE | Noted: 2023-02-27

## 2023-08-09 PROBLEM — R07.9 CHEST PAIN: Status: ACTIVE | Noted: 2021-04-27

## 2023-08-09 PROCEDURE — G8427 DOCREV CUR MEDS BY ELIG CLIN: HCPCS | Performed by: STUDENT IN AN ORGANIZED HEALTH CARE EDUCATION/TRAINING PROGRAM

## 2023-08-09 PROCEDURE — 1036F TOBACCO NON-USER: CPT | Performed by: STUDENT IN AN ORGANIZED HEALTH CARE EDUCATION/TRAINING PROGRAM

## 2023-08-09 PROCEDURE — G8419 CALC BMI OUT NRM PARAM NOF/U: HCPCS | Performed by: STUDENT IN AN ORGANIZED HEALTH CARE EDUCATION/TRAINING PROGRAM

## 2023-08-09 PROCEDURE — 99204 OFFICE O/P NEW MOD 45 MIN: CPT | Performed by: STUDENT IN AN ORGANIZED HEALTH CARE EDUCATION/TRAINING PROGRAM

## 2023-08-09 RX ORDER — NALOXONE HYDROCHLORIDE 4 MG/.1ML
SPRAY NASAL
COMMUNITY
Start: 2023-07-19

## 2023-08-09 NOTE — H&P (VIEW-ONLY)
Chronic Pain Clinic Note     Encounter Date: 8/9/2023     SUBJECTIVE:  Chief Complaint   Patient presents with    New Patient       History of Present Illness:   Shirin Rick is a 34 y.o. female who presents with DDD of L5, S1. Patient had 1 Pain Mgmt  consult in [place] in July 2023. Patient not in agreement with their plan. Medication Refill: N/A    Current Complaints of Pain:   Location: Mid-Lower back   Radiation: yes  Severity: Moderate  Pain Numerical Score - 7   Average: 9     Highest: 9  Lowest: 7  Character/Quality: Complains of pain that is sharp, throbbing, and stabbing. Timing: Constant  Associated symptoms:   Numbness: yes  Weakness: yes when urinating  Exacerbating factors: standing, bending, lying in bed  Alleviating factors: medications, ice, heat  Length of time pain has been present: Started on approximately 2 yrs. Ago but reports pain has been consistent since March of 2023  Inciting event/injury: pre term labor, pain started after delivery. Bowel/Bladder incontinence: none  Falls: 1 fall. Patient reports fall happened in 2022, Patient hit head and  reports concussion and vertigo symptoms. Physical Therapy: yes, 6 weeks with [facility] PT June of 2023    History of Interventions:   Surgery: No previous lumbar/cervical surgeries  Injections: None    Imaging:    MRI with [facility] in July of 2023    Lumbar CT 2/25/2022    FINDINGS:  BONES/ALIGNMENT: There is mild levoconvex alignment of the spine. The  vertebral body heights are maintained, and the posterior elements appear  intact. No osseous destructive lesion is seen. DEGENERATIVE CHANGES: Mild degenerative disc height loss L4 through S1 with  shallow circumferential disc bulges that minimally narrows the spinal canal  at both levels and the lateral recesses at L4-L5. The neural foramen appear  widely patent. SOFT TISSUES/RETROPERITONEUM: No acute abnormality in the paraspinal soft  tissues.   Chondrocalcinosis and bilateral nephrolithiasis. 2.4 cm water  attenuation dominant follicle in the partially visualized left ovary which  requires no follow-up. IMPRESSION:  No acute bony abnormality of the lumbar spine. Minimal levoconvex curvature which may be positional, though correlate for  contributory muscle spasm. Mild degenerative disc disease L4 through S1 with only minimal spinal canal  and lateral recess stenosis.     Past Medical History:   Diagnosis Date    Anemia     GERD (gastroesophageal reflux disease)     Kidney stones     Postpartum depression        Past Surgical History:   Procedure Laterality Date    EXTERNAL EAR SURGERY Left     LITHOTRIPSY Right 08/25/2020    ESWL EXTRACORPOREAL SHOCK WAVE LITHOTRIPSY     LITHOTRIPSY Right 8/25/2020    ESWL EXTRACORPOREAL SHOCK WAVE LITHOTRIPSY performed by Maria Luz Oliver MD at [address] [facility] Right 5/26/2016    right cysto HLL with stent exchange    TUBAL LIGATION N/A 7/21/2021    TUBAL LIGATION performed by Abby Salinas MD at [facility] OR    UPPER GASTROINTESTINAL ENDOSCOPY      VAGINA SURGERY      LABIA HEMATOMA       Family History   Problem Relation Age of Onset    Ulcerative Colitis Father     Ulcerative Colitis Sister     Asthma Sister     Heart Disease Maternal Grandfather     Stroke Maternal Grandfather     High Blood Pressure Maternal Grandfather     Arthritis Maternal Grandmother     Vision Loss Maternal Grandmother        Social History     Socioeconomic History    Marital status:      Spouse name: Not on file    Number of children: Not on file    Years of education: Not on file    Highest education level: Not on file   Occupational History    Not on file   Tobacco Use    Smoking status: Never    Smokeless tobacco: Never   Vaping Use    Vaping Use: Never used   Substance and Sexual Activity    Alcohol use: Not Currently     Comment: SOCIAL    Drug use: No    Sexual activity: Yes     Partners: Male   Other Topics Concern    Not on file

## 2023-08-09 NOTE — PROGRESS NOTES
Chronic Pain Clinic Note     Encounter Date: 8/9/2023     SUBJECTIVE:  Chief Complaint   Patient presents with    New Patient       History of Present Illness:   Harlon Boeck is a 34 y.o. female who presents with DDD of L5, S1. Patient had 1 Pain Mgmt  consult in [place] in July 2023. Patient not in agreement with their plan. Medication Refill: N/A    Current Complaints of Pain:   Location: Mid-Lower back   Radiation: yes  Severity: Moderate  Pain Numerical Score - 7   Average: 9     Highest: 9  Lowest: 7  Character/Quality: Complains of pain that is sharp, throbbing, and stabbing. Timing: Constant  Associated symptoms:   Numbness: yes  Weakness: yes when urinating  Exacerbating factors: standing, bending, lying in bed  Alleviating factors: medications, ice, heat  Length of time pain has been present: Started on approximately 2 yrs. Ago but reports pain has been consistent since March of 2023  Inciting event/injury: pre term labor, pain started after delivery. Bowel/Bladder incontinence: none  Falls: 1 fall. Patient reports fall happened in 2022, Patient hit head and  reports concussion and vertigo symptoms. Physical Therapy: yes, 6 weeks with [facility] PT June of 2023    History of Interventions:   Surgery: No previous lumbar/cervical surgeries  Injections: None    Imaging:    MRI with [facility] in July of 2023    Lumbar CT 2/25/2022    FINDINGS:  BONES/ALIGNMENT: There is mild levoconvex alignment of the spine. The  vertebral body heights are maintained, and the posterior elements appear  intact. No osseous destructive lesion is seen. DEGENERATIVE CHANGES: Mild degenerative disc height loss L4 through S1 with  shallow circumferential disc bulges that minimally narrows the spinal canal  at both levels and the lateral recesses at L4-L5. The neural foramen appear  widely patent. SOFT TISSUES/RETROPERITONEUM: No acute abnormality in the paraspinal soft  tissues.   Chondrocalcinosis and bilateral

## 2023-08-17 ENCOUNTER — HOSPITAL ENCOUNTER (OUTPATIENT)
Age: 33
Setting detail: OUTPATIENT SURGERY
Discharge: HOME | End: 2023-08-17
Attending: STUDENT IN AN ORGANIZED HEALTH CARE EDUCATION/TRAINING PROGRAM | Admitting: STUDENT IN AN ORGANIZED HEALTH CARE EDUCATION/TRAINING PROGRAM
Payer: COMMERCIAL

## 2023-08-17 ENCOUNTER — APPOINTMENT (OUTPATIENT)
Dept: GENERAL RADIOLOGY | Age: 33
End: 2023-08-17
Attending: STUDENT IN AN ORGANIZED HEALTH CARE EDUCATION/TRAINING PROGRAM
Payer: COMMERCIAL

## 2023-08-17 VITALS
TEMPERATURE: 99 F | DIASTOLIC BLOOD PRESSURE: 80 MMHG | SYSTOLIC BLOOD PRESSURE: 112 MMHG | HEART RATE: 83 BPM | HEIGHT: 63 IN | OXYGEN SATURATION: 98 % | BODY MASS INDEX: 26.4 KG/M2 | RESPIRATION RATE: 16 BRPM | WEIGHT: 149 LBS

## 2023-08-17 PROCEDURE — 2709999900 HC NON-CHARGEABLE SUPPLY: Performed by: STUDENT IN AN ORGANIZED HEALTH CARE EDUCATION/TRAINING PROGRAM

## 2023-08-17 PROCEDURE — 6360000002 HC RX W HCPCS: Performed by: STUDENT IN AN ORGANIZED HEALTH CARE EDUCATION/TRAINING PROGRAM

## 2023-08-17 PROCEDURE — 2580000003 HC RX 258: Performed by: STUDENT IN AN ORGANIZED HEALTH CARE EDUCATION/TRAINING PROGRAM

## 2023-08-17 PROCEDURE — 3600000002 HC SURGERY LEVEL 2 BASE: Performed by: STUDENT IN AN ORGANIZED HEALTH CARE EDUCATION/TRAINING PROGRAM

## 2023-08-17 PROCEDURE — 62323 NJX INTERLAMINAR LMBR/SAC: CPT | Performed by: STUDENT IN AN ORGANIZED HEALTH CARE EDUCATION/TRAINING PROGRAM

## 2023-08-17 PROCEDURE — 6360000004 HC RX CONTRAST MEDICATION: Performed by: STUDENT IN AN ORGANIZED HEALTH CARE EDUCATION/TRAINING PROGRAM

## 2023-08-17 PROCEDURE — 2500000003 HC RX 250 WO HCPCS: Performed by: STUDENT IN AN ORGANIZED HEALTH CARE EDUCATION/TRAINING PROGRAM

## 2023-08-17 RX ORDER — LIDOCAINE HYDROCHLORIDE 10 MG/ML
INJECTION, SOLUTION EPIDURAL; INFILTRATION; INTRACAUDAL; PERINEURAL PRN
Status: DISCONTINUED | OUTPATIENT
Start: 2023-08-17 | End: 2023-08-17 | Stop reason: ALTCHOICE

## 2023-08-17 RX ORDER — SODIUM CHLORIDE 9 MG/ML
INJECTION, SOLUTION INTRAVENOUS CONTINUOUS PRN
Status: COMPLETED | OUTPATIENT
Start: 2023-08-17 | End: 2023-08-17

## 2023-08-17 ASSESSMENT — PAIN DESCRIPTION - PAIN TYPE: TYPE: CHRONIC PAIN

## 2023-08-17 ASSESSMENT — PAIN DESCRIPTION - ORIENTATION
ORIENTATION: LOWER;MID
ORIENTATION: RIGHT

## 2023-08-17 ASSESSMENT — PAIN SCALES - GENERAL
PAINLEVEL_OUTOF10: 7
PAINLEVEL_OUTOF10: 6

## 2023-08-17 ASSESSMENT — PAIN DESCRIPTION - DIRECTION: RADIATING_TOWARDS: RIGHT HIP

## 2023-08-17 ASSESSMENT — PAIN DESCRIPTION - DESCRIPTORS: DESCRIPTORS: DISCOMFORT

## 2023-08-17 ASSESSMENT — PAIN DESCRIPTION - LOCATION
LOCATION: BACK
LOCATION: HIP

## 2023-08-17 ASSESSMENT — PAIN - FUNCTIONAL ASSESSMENT: PAIN_FUNCTIONAL_ASSESSMENT: 0-10

## 2023-08-17 NOTE — INTERVAL H&P NOTE
Update History & Physical    The patient's History and Physical of August 9, 2023 was reviewed with the patient and I examined the patient. There was no change. The surgical site was confirmed by the patient and me. Plan: The risks, benefits, expected outcome, and alternative to the recommended procedure have been discussed with the patient. Patient understands and wants to proceed with the procedure.      Electronically signed by Mae Blair DO on 8/17/2023 at 9:19 AM

## 2023-08-17 NOTE — DISCHARGE INSTRUCTIONS
You have received an injection of local anesthetic and corticosteroids. The local anesthetic effect typically last 4-6 hours. It may take 3-7 days for the corticosteroids to reach optimal effect. Please pay close attention to the following information/instructions: You may not drive for 12 hours after your injection if you had sedation or epidural injection. It is common to experience mild soreness at the injection site(s) for 24-48 hours. Ice is the best remedy. You may apply ice for 20 minutes at a time several times a day as needed. Avoid heat to the injection area for 72 hours. No hot packs, saunas, or steam rooms during this time. A regular shower is OK. You may immediately restart your regular medication regimen, including pain medications, anti-inflammatory, and blood thinners. Please remove the sterile dressing/band-aid tonight or tomorrow morning. Do not leave it on after you have taken a shower or gotten it wet. Corticosteroid side effects can occur after this injection, but they usually resolve after several days. These side effects can include flushing, hot flashes, mild palpitations, insomnia, water retention, feeling anxious/restless, or headaches. While it is extremely rare to get an infection after a spinal procedure, please call the office if you develop any signs of infection. These signs include fevers/chills, severely increased pain, redness at the injections site, or any drainage from the injection site. Remember that the corticosteroid benefit (long-term pain relief) from an injection can take as long as 10 days to occur. You may have a period of slightly increased pain after your injection before the cortisone takes effect. You may resume all of your normal daily activities 24 hours after your injection. It is OK to restart your exercise or physical therapy program as soon as you feel comfortable doing so.   Please complete the pain diary given to you after your injection. The information you provide on this diary is used to guide your treatment plan. You should schedule a follow-up visit in 2-3 weeks to review your response to this injection. Please bring your pain diary with you to this appointment. Education Materials Received: yes  Belongings Returned: yes    Resume all current outpatient medication(s). The discharge instructions have been reviewed with the patient and/or Guardian. Patient and/or Guardian signed and retained a printed copy.

## 2023-08-17 NOTE — PROGRESS NOTES
Pt ready for discharge. VS stable, pain level tolerable. Pt and family verbal understanding of instructions. Pt taken out in wheelchair per this RN for safety measures.

## 2023-08-17 NOTE — OP NOTE
PROCEDURE PERFORMED: Lumbar Interlaminar Epidural Steroid Injection using Fluoroscopy    PREOPERATIVE DIAGNOSIS: Lumbosacral radiculopathy    INDICATIONS: Radicular leg pain    The patient's history and physical exam were reviewed. The risk, benefits, and alternatives of the procedure were discussed and all questions were answered to the patient's satisfaction. The patient agreed to proceed and written informed consent was obtained. POSTOPERATIVE DIAGNOSIS: Lumbosacral radiculopathy    PHYSICIAN:  Dr. Timothy Macdonald DO    ANESTHESIA:  LOCAL    ASSISTANT:  NONE    PATHOLOGY:  NONE    ESTIMATED BLOOD LOSS:  N/A    IMPLANTS:  NONE    PROCEDURE DESCRIPTION: Lumbar interlaminar epidural injection using fluoroscopy    The patient was placed on the operative bed in prone position. The area was prepped with  Chlorhexidine. The area was then draped in a sterile fashion. An AP  film was taken to identify the correct level. The overlying skin and subcutaneous tissues were anesthetized using 1% preservative-free lidocaine. Then a 18-gauge 3.5 inch Tuohy needle was advanced under fluoroscopic guidance using AP and lateral views into the interlaminar space at L5-S1. Loss-of-resistance syringe was then attached. Loss-of-resistance was then obtained. Then, after negative aspiration, Omnipaque was injected under AP view fluoroscopy and confirmed adequate spread along the nerve root and in the epidural space. There was no evidence of intravascular uptake or intrathecal spread on imaging. A lateral view was also taken confirming adequate epidural spread. At this point, after negative aspiration, a total volume of treatment injectate consisting of 2 mL of 10 mg/mL dexamethasone and 5 mL of preservative-free normal saline was injected easily. The needle was then flushed and removed. The needle insertion site was dressed appropriately.     The patient was transferred to the postoperative care unit in stable condition. Written discharge instructions were given to the patient. COMPLICATIONS:  There were no apparent complications. The patient tolerated the procedure well.

## 2023-08-25 NOTE — PROGRESS NOTES
Patient presents to L&D today for abdominal cramping and vaginal pressure, pt has polyhydramnios with this pregnancy. IUP at 37 5/7  weeks     NST is reactive. Quality of tracing is satisfactory. Pt settled down. Able to complete strip. Strip taken to Dr García reviewed. Aware of difficulty in obtaining strip do to patients monitoring. Strip reviewed. Monitoring completed. And sign per Dr García.

## 2023-08-29 ENCOUNTER — TELEPHONE (OUTPATIENT)
Age: 33
End: 2023-08-29

## 2023-08-29 NOTE — TELEPHONE ENCOUNTER
Patient states she is having quite a bit of pain since having Epidural done 8/17/23. Didn't know if this was normal with the procedure.

## 2023-08-29 NOTE — TELEPHONE ENCOUNTER
Patient returned call to office to clarify if provider had responded to concerns. This SN advised patient of response message from provider. Patient voiced knowledge and understanding.

## 2023-09-13 ENCOUNTER — OFFICE VISIT (OUTPATIENT)
Age: 33
End: 2023-09-13
Payer: COMMERCIAL

## 2023-09-13 VITALS
WEIGHT: 154 LBS | HEART RATE: 80 BPM | RESPIRATION RATE: 19 BRPM | BODY MASS INDEX: 27.28 KG/M2 | OXYGEN SATURATION: 95 %

## 2023-09-13 DIAGNOSIS — M51.36 LUMBAR DEGENERATIVE DISC DISEASE: ICD-10-CM

## 2023-09-13 DIAGNOSIS — M47.817 LUMBOSACRAL SPONDYLOSIS WITHOUT MYELOPATHY: Primary | ICD-10-CM

## 2023-09-13 DIAGNOSIS — M79.18 MYOFASCIAL PAIN SYNDROME: ICD-10-CM

## 2023-09-13 PROCEDURE — 99214 OFFICE O/P EST MOD 30 MIN: CPT | Performed by: STUDENT IN AN ORGANIZED HEALTH CARE EDUCATION/TRAINING PROGRAM

## 2023-09-13 PROCEDURE — 1036F TOBACCO NON-USER: CPT | Performed by: STUDENT IN AN ORGANIZED HEALTH CARE EDUCATION/TRAINING PROGRAM

## 2023-09-13 PROCEDURE — G8427 DOCREV CUR MEDS BY ELIG CLIN: HCPCS | Performed by: STUDENT IN AN ORGANIZED HEALTH CARE EDUCATION/TRAINING PROGRAM

## 2023-09-13 PROCEDURE — G8419 CALC BMI OUT NRM PARAM NOF/U: HCPCS | Performed by: STUDENT IN AN ORGANIZED HEALTH CARE EDUCATION/TRAINING PROGRAM

## 2023-09-13 NOTE — PROGRESS NOTES
strength and normal sensation. There is no evidence of radiculopathy or myelopathy on examination. There are no red flags in the patient's history. The patient has failed conservative measures including outpatient physical therapy, greater than 3 medications for pain relief, a self-directed therapy program, as well as activity modification all within the last 6 weeks over 3 months. The patient's pain is moderate to severe that causes functional deficit measured on pain and disability scale. The patient reports worsening quality of life. PLAN:  Medications: For nonopioid therapy, the following medications were prescribed:    -Continue medication prescribed by primary care physician    Opioid therapy:  -Not indicated    Interventions:  -Plan for bilateral lumbar L3, L4, L5 medial branch blocks  -Status post lumbar L5-S1 interlaminar epidural steroid injection on 8/17/2023 with minimal improvement in pain and function  -No need to hold Xarelto    Imaging:  -Reviewed lumbar MRI with patient in room    Behavioral Therapies:  -Continue daily stretching and home exercise program    Referrals:  -None    Follow-up Plan:  -After procedure    Patient was offered intervention where appropriate. Multi-modal Pain Therapy: The patient was explicitly considered for multimodal and interdisciplinary therapy. Non-opioid and non-pharmacological opportunities to enhance analgesia and quality of life have been and will continue to be pursued.     Reymundo Child,   Interventional Pain Management/PM&R   [address]    Orders Placed This Encounter    FACET JOINT L/S     Standing Status:   Future     Standing Expiration Date:   9/13/2024     Scheduling Instructions:      Bilateral lumbar L3, L4, L5 MBBx2      No need to hold Xarelto    FACET JOINT L/S 2ND LEVEL     Standing Status:   Future     Standing Expiration Date:   9/13/2024

## 2023-09-13 NOTE — H&P (VIEW-ONLY)
Chronic Pain Clinic Note     Encounter Date: 9/13/2023     SUBJECTIVE:  Chief Complaint   Patient presents with    Back Pain       History of Present Illness:   Daron Noel is a 34 y.o. female who presents for follow up after her epidural.     Medication Refill: N/A    Current Complaints of Pain:   Location: Low back  Radiation: None  Severity: Moderate  Pain Numerical Score - 7   Average: 7     Highest: 10  Lowest: 5  Character/Quality: Complains of pain that is sharp and stabbing. Timing: Constant  Associated symptoms:   Numbness: No  Weakness: No  Exacerbating factors: standing, bending  Alleviating factors: medications, ice, heat, lying down  Length of time pain has been present: Started on approximately 2 yrs. Ago but reports pain has been consistent since March of 2023  Inciting event/injury: pre term labor, pain started after delivery. Bowel/Bladder incontinence: none  Falls: 1 fall. Patient reports fall happened in 2022, Patient hit head and  reports concussion and vertigo symptoms. Physical Therapy: yes, 6 weeks with [facility] PT June of 2023    History of Interventions:   Surgery: No previous lumbar/cervical surgeries  Injections: epidural    Imaging:    MRI with [facility] in July of 2023    Lumbar CT 2/25/2022    FINDINGS:  BONES/ALIGNMENT: There is mild levoconvex alignment of the spine. The  vertebral body heights are maintained, and the posterior elements appear  intact. No osseous destructive lesion is seen. DEGENERATIVE CHANGES: Mild degenerative disc height loss L4 through S1 with  shallow circumferential disc bulges that minimally narrows the spinal canal  at both levels and the lateral recesses at L4-L5. The neural foramen appear  widely patent. SOFT TISSUES/RETROPERITONEUM: No acute abnormality in the paraspinal soft  tissues. Chondrocalcinosis and bilateral nephrolithiasis.   2.4 cm water  attenuation dominant follicle in the partially visualized left ovary which  requires no

## 2023-09-13 NOTE — PATIENT INSTRUCTIONS
SURVEY:    You may be receiving a survey from [employer] regarding your visit today. Please complete the survey to enable us to provide the highest quality of care to you and your family. If you cannot score us a very good on any question, please call the office to discuss how we could have made your experience a very good one. Thank you.   [facility]  Erik Carlos, DO FARR, [address] [facility] [address], [id number] [facility]

## 2023-09-14 ENCOUNTER — OFFICE VISIT (OUTPATIENT)
Dept: ONCOLOGY | Age: 33
End: 2023-09-14

## 2023-09-14 VITALS
DIASTOLIC BLOOD PRESSURE: 75 MMHG | BODY MASS INDEX: 27.82 KG/M2 | RESPIRATION RATE: 18 BRPM | WEIGHT: 157 LBS | HEIGHT: 63 IN | TEMPERATURE: 97.7 F | SYSTOLIC BLOOD PRESSURE: 126 MMHG | HEART RATE: 98 BPM

## 2023-09-14 DIAGNOSIS — I26.94 MULTIPLE SUBSEGMENTAL PULMONARY EMBOLI WITHOUT ACUTE COR PULMONALE (HCC): ICD-10-CM

## 2023-09-14 DIAGNOSIS — O03.9 SPONTANEOUS ABORTION: Primary | ICD-10-CM

## 2023-10-04 DIAGNOSIS — I26.94 MULTIPLE SUBSEGMENTAL PULMONARY EMBOLI WITHOUT ACUTE COR PULMONALE (HCC): ICD-10-CM

## 2023-10-09 DIAGNOSIS — O03.9 SPONTANEOUS ABORTION: ICD-10-CM

## 2023-10-11 ENCOUNTER — APPOINTMENT (OUTPATIENT)
Dept: GENERAL RADIOLOGY | Age: 33
End: 2023-10-11
Attending: STUDENT IN AN ORGANIZED HEALTH CARE EDUCATION/TRAINING PROGRAM
Payer: COMMERCIAL

## 2023-10-11 ENCOUNTER — HOSPITAL ENCOUNTER (OUTPATIENT)
Age: 33
Setting detail: OUTPATIENT SURGERY
Discharge: HOME | End: 2023-10-11
Attending: STUDENT IN AN ORGANIZED HEALTH CARE EDUCATION/TRAINING PROGRAM | Admitting: STUDENT IN AN ORGANIZED HEALTH CARE EDUCATION/TRAINING PROGRAM
Payer: COMMERCIAL

## 2023-10-11 VITALS
TEMPERATURE: 97.5 F | HEART RATE: 87 BPM | RESPIRATION RATE: 16 BRPM | OXYGEN SATURATION: 98 % | SYSTOLIC BLOOD PRESSURE: 142 MMHG | DIASTOLIC BLOOD PRESSURE: 87 MMHG

## 2023-10-11 LAB — HCG UR QL: NEGATIVE

## 2023-10-11 PROCEDURE — 81025 URINE PREGNANCY TEST: CPT

## 2023-10-11 PROCEDURE — 3600000002 HC SURGERY LEVEL 2 BASE: Performed by: STUDENT IN AN ORGANIZED HEALTH CARE EDUCATION/TRAINING PROGRAM

## 2023-10-11 PROCEDURE — 64493 INJ PARAVERT F JNT L/S 1 LEV: CPT | Performed by: STUDENT IN AN ORGANIZED HEALTH CARE EDUCATION/TRAINING PROGRAM

## 2023-10-11 PROCEDURE — 2500000003 HC RX 250 WO HCPCS: Performed by: STUDENT IN AN ORGANIZED HEALTH CARE EDUCATION/TRAINING PROGRAM

## 2023-10-11 PROCEDURE — 64494 INJ PARAVERT F JNT L/S 2 LEV: CPT | Performed by: STUDENT IN AN ORGANIZED HEALTH CARE EDUCATION/TRAINING PROGRAM

## 2023-10-11 PROCEDURE — 2709999900 HC NON-CHARGEABLE SUPPLY: Performed by: STUDENT IN AN ORGANIZED HEALTH CARE EDUCATION/TRAINING PROGRAM

## 2023-10-11 RX ORDER — LIDOCAINE HYDROCHLORIDE 20 MG/ML
INJECTION, SOLUTION EPIDURAL; INFILTRATION; INTRACAUDAL; PERINEURAL PRN
Status: DISCONTINUED | OUTPATIENT
Start: 2023-10-11 | End: 2023-10-11 | Stop reason: ALTCHOICE

## 2023-10-11 ASSESSMENT — PAIN DESCRIPTION - LOCATION
LOCATION: BACK
LOCATION: HIP

## 2023-10-11 ASSESSMENT — PAIN DESCRIPTION - ORIENTATION
ORIENTATION: LOWER
ORIENTATION: RIGHT

## 2023-10-11 ASSESSMENT — PAIN SCALES - GENERAL
PAINLEVEL_OUTOF10: 6
PAINLEVEL_OUTOF10: 7

## 2023-10-11 ASSESSMENT — PAIN DESCRIPTION - PAIN TYPE
TYPE: CHRONIC PAIN
TYPE: CHRONIC PAIN

## 2023-10-11 NOTE — PROGRESS NOTES
Pt did not receive sedation and is alert and oriented. Pt verbalized readiness to go home. Discharge instructions given to pt. Band aids over injection sites are clean, dry, and intact. Verbalized understanding and all questions answered at this time. Pt stable with ambulation at discharge.

## 2023-10-11 NOTE — DISCHARGE INSTRUCTIONS
You have received an injection of local anesthetic and corticosteroids. The local anesthetic effect typically last 4-6 hours. It may take 3-7 days for the corticosteroids to reach optimal effect. Please pay close attention to the following information/instructions: It is common to experience mild soreness at the injection site(s) for 24-48 hours. Ice is the best remedy. You may apply ice for 20 minutes at a time several times a day as needed. Avoid heat to the injection area for 72 hours. No hot packs, saunas, or steam rooms during this time. A regular shower is OK. You may immediately restart your regular medication regimen, including pain medications, anti-inflammatory, and blood thinners. Please remove the sterile dressing/band-aid tonight or tomorrow morning. Do not leave it on after you have taken a shower or gotten it wet. Corticosteroid side effects can occur after this injection, but they usually resolve after several days. These side effects can include flushing, hot flashes, mild palpitations, insomnia, water retention, feeling anxious/restless, or headaches. While it is extremely rare to get an infection after a spinal procedure, please call the office if you develop any signs of infection. These signs include fevers/chills, severely increased pain, redness at the injections site, or any drainage from the injection site. Remember that the corticosteroid benefit (long-term pain relief) from an injection can take as long as 10 days to occur. You may have a period of slightly increased pain after your injection before the cortisone takes effect. You may resume all of your normal daily activities 24 hours after your injection. It is OK to restart your exercise or physical therapy program as soon as you feel comfortable doing so. Please complete the pain diary given to you after your injection. The information you provide on this diary is used to guide your treatment plan.   You should

## 2023-10-11 NOTE — OP NOTE
PROCEDURE PERFORMED: Bilateral Lumbar medial branch block    PREOPERATIVE DIAGNOSIS: Lumbosacral spondylosis    INDICATIONS: Chronic low back pain    The patient's history and physical exam were reviewed. The risk, benefits, and alternatives of the procedure were discussed and all questions were answered to the patient's satisfaction. The patient agreed to proceed and written informed consent was obtained. POSTOPERATIVE DIAGNOSIS: Lumbar spondylosis    PHYSICIAN:  Dr. Nikki Tirado DO    ANESTHESIA:  LOCAL    ASSISTANT:  NONE    PATHOLOGY:  NONE    ESTIMATED BLOOD LOSS:  N/A    IMPLANTS:  NONE    PROCEDURE DESCRIPTION: Diagnostic bilateral lumbar medial branch block using fluoroscopy    The patient was placed on the operative bed in prone position. The area was prepped with  Chlorhexidine. The area was then draped in a sterile fashion. Targeted levels: Bilateral lumbar L3, L4, L5 medial branch block    An AP  fluoroscopy image was used to identify and huang Menendez's point at the L3, L4 levels on the targeted side. Additionally, the junction of the SAP and sacral ala was also marked on the same side. A 25-gauge 3-1/2 inch Quincke spinal needle was then advanced toward each of these points under fluoroscopic guidance. Once bone was contacted, negative aspiration was confirmed and 0.5 mL of lidocaine 2% was injected each level. The needles were removed and the needle sites were dressed appropriately. The same procedure was performed on the opposite side. The patient was transferred to the postoperative care unit in stable condition. Written discharge instructions were given to the patient. The patient was given a pain diary upon discharge. COMPLICATIONS:  There were no apparent complications. The patient tolerated the procedure well.

## 2023-10-11 NOTE — INTERVAL H&P NOTE
Update History & Physical    The patient's History and Physical of September 13, 2023 was reviewed with the patient and I examined the patient. There was no change. The surgical site was confirmed by the patient and me. Plan: The risks, benefits, expected outcome, and alternative to the recommended procedure have been discussed with the patient. Patient understands and wants to proceed with the procedure.      Electronically signed by Mariel Patel DO on 10/11/2023 at 1:38 PM

## 2023-10-12 ENCOUNTER — TELEPHONE (OUTPATIENT)
Age: 33
End: 2023-10-12

## 2023-10-12 NOTE — TELEPHONE ENCOUNTER
Patient was sore at injection site yesterday. Today, patient is 75-80% improved. She is ambulating better, able to bend over and get back up, able to stand straight up and feels \"decent overall\". Has most relief on right side.

## 2023-10-23 ENCOUNTER — TELEPHONE (OUTPATIENT)
Age: 33
End: 2023-10-23

## 2023-10-23 NOTE — TELEPHONE ENCOUNTER
Patient states she is having bad back pain. Saw FÁTIMA Rich CNP and was given some Tramadol. PCP did not want to give her medrol dose pack because she is scheduled for MBB on 11/8/23. Patient wanted to know if she could have steroid from her PCP or should she wait due to injections.

## 2023-11-08 ENCOUNTER — APPOINTMENT (OUTPATIENT)
Dept: GENERAL RADIOLOGY | Age: 33
End: 2023-11-08
Attending: STUDENT IN AN ORGANIZED HEALTH CARE EDUCATION/TRAINING PROGRAM
Payer: COMMERCIAL

## 2023-11-08 ENCOUNTER — HOSPITAL ENCOUNTER (OUTPATIENT)
Age: 33
Setting detail: OUTPATIENT SURGERY
Discharge: HOME | End: 2023-11-08
Attending: STUDENT IN AN ORGANIZED HEALTH CARE EDUCATION/TRAINING PROGRAM | Admitting: STUDENT IN AN ORGANIZED HEALTH CARE EDUCATION/TRAINING PROGRAM
Payer: COMMERCIAL

## 2023-11-08 VITALS
HEIGHT: 63 IN | DIASTOLIC BLOOD PRESSURE: 93 MMHG | RESPIRATION RATE: 18 BRPM | TEMPERATURE: 97.2 F | OXYGEN SATURATION: 100 % | BODY MASS INDEX: 28.53 KG/M2 | HEART RATE: 87 BPM | WEIGHT: 161 LBS | SYSTOLIC BLOOD PRESSURE: 138 MMHG

## 2023-11-08 PROCEDURE — 64493 INJ PARAVERT F JNT L/S 1 LEV: CPT | Performed by: STUDENT IN AN ORGANIZED HEALTH CARE EDUCATION/TRAINING PROGRAM

## 2023-11-08 PROCEDURE — 2500000003 HC RX 250 WO HCPCS: Performed by: STUDENT IN AN ORGANIZED HEALTH CARE EDUCATION/TRAINING PROGRAM

## 2023-11-08 PROCEDURE — 2709999900 HC NON-CHARGEABLE SUPPLY: Performed by: STUDENT IN AN ORGANIZED HEALTH CARE EDUCATION/TRAINING PROGRAM

## 2023-11-08 PROCEDURE — 3600000002 HC SURGERY LEVEL 2 BASE: Performed by: STUDENT IN AN ORGANIZED HEALTH CARE EDUCATION/TRAINING PROGRAM

## 2023-11-08 PROCEDURE — 64494 INJ PARAVERT F JNT L/S 2 LEV: CPT | Performed by: STUDENT IN AN ORGANIZED HEALTH CARE EDUCATION/TRAINING PROGRAM

## 2023-11-08 RX ORDER — LIDOCAINE HYDROCHLORIDE 20 MG/ML
INJECTION, SOLUTION EPIDURAL; INFILTRATION; INTRACAUDAL; PERINEURAL PRN
Status: DISCONTINUED | OUTPATIENT
Start: 2023-11-08 | End: 2023-11-08 | Stop reason: ALTCHOICE

## 2023-11-08 RX ORDER — OMEPRAZOLE 20 MG/1
20 CAPSULE, DELAYED RELEASE ORAL DAILY
COMMUNITY

## 2023-11-08 ASSESSMENT — PAIN - FUNCTIONAL ASSESSMENT: PAIN_FUNCTIONAL_ASSESSMENT: 0-10

## 2023-11-08 ASSESSMENT — PAIN DESCRIPTION - LOCATION: LOCATION: BACK

## 2023-11-08 ASSESSMENT — PAIN DESCRIPTION - DESCRIPTORS: DESCRIPTORS: ACHING

## 2023-11-08 ASSESSMENT — PAIN SCALES - GENERAL: PAINLEVEL_OUTOF10: 10

## 2023-11-08 NOTE — H&P
activity: Yes     Partners: Male   Other Topics Concern    Not on file   Social History Narrative    Not on file     Social Determinants of Health     Financial Resource Strain: Not on file   Food Insecurity: Not on file   Transportation Needs: Not on file   Physical Activity: Not on file   Stress: Not on file   Social Connections: Not on file   Intimate Partner Violence: Not on file   Housing Stability: Not on file       Medications & Allergies:   Current Outpatient Medications   Medication Instructions    gabapentin (NEURONTIN) 600 mg, Oral, 3 TIMES DAILY    MELOXICAM PO Oral    omeprazole (PRILOSEC) 20 mg, Oral, DAILY    rivaroxaban (XARELTO) 20 mg, Oral, DAILY WITH BREAKFAST    tamsulosin (FLOMAX) 0.4 mg, Oral, DAILY    tiZANidine (ZANAFLEX) 2 mg, Oral, 2 TIMES DAILY PRN    traMADol (ULTRAM) 50 MG tablet        Allergies   Allergen Reactions    Cyclobenzaprine Other (See Comments)     nightmares    Metoclopramide      Causes super anxiousness  Other reaction(s): Unknown    Norco [Hydrocodone-Acetaminophen] Anxiety       Review of Systems:   Focused review of systems was performed, and negative as pertinent to diagnosis, except as stated in HPI. Physical Exam  Constitutional:       Appearance: Normal appearance. Pulmonary:      Effort: Pulmonary effort is normal.   Neurological:      Mental Status: alert. Psychiatric:         Attention and Perception: Attention and perception normal.         Mood and Affect: Mood and affect normal.   Cardiovascular:      Rate: Normal rate. Patient's current physical status, medications, medical history, and HPI have been reviewed and updated as appropriate on this date: 11/08/23    Risk/Benefit(s): The risks, benefits, alternatives, and potential complications have been discussed with the patient/family and informed consent has been obtained for the procedure/sedation.     Diagnosis:   Lumbosacral spondylosis      Plan: Bilateral lumbar L3, L4, L5 medial branch

## 2023-11-08 NOTE — DISCHARGE INSTRUCTIONS
You have received an injection of local anesthetic. The local anesthetic effect typically last 4-6 hours. It may take 3-7 days for the corticosteroids to reach optimal effect. Please pay close attention to the following information/instructions: You may not drive for 12 hours after your injection if you had sedation or epidural injection. It is common to experience mild soreness at the injection site(s) for 24-48 hours. Ice is the best remedy. You may apply ice for 20 minutes at a time several times a day as needed. Avoid heat to the injection area for 72 hours. No hot packs, saunas, or steam rooms during this time. A regular shower is OK. You may immediately restart your regular medication regimen, including pain medications, anti-inflammatory, and blood thinners. Please remove the sterile dressing/band-aid tonight or tomorrow morning. Do not leave it on after you have taken a shower or gotten it wet. While it is extremely rare to get an infection after a spinal procedure, please call the office if you develop any signs of infection. These signs include fevers/chills, severely increased pain, redness at the injections site, or any drainage from the injection site. Remember that the corticosteroid benefit (long-term pain relief) from an injection can take as long as 10 days to occur. You may have a period of slightly increased pain after your injection before the cortisone takes effect. You may resume all of your normal daily activities 24 hours after your injection. It is OK to restart your exercise or physical therapy program as soon as you feel comfortable doing so. Please complete the pain diary given to you after your injection. The information you provide on this diary is used to guide your treatment plan. You should schedule a follow-up visit in 2-3 weeks to review your response to this injection. Please bring your pain diary with you to this appointment.         Education Materials

## 2023-11-08 NOTE — OP NOTE
PROCEDURE PERFORMED: Bilateral Lumbar medial branch block    PREOPERATIVE DIAGNOSIS: Lumbosacral spondylosis    INDICATIONS: Chronic low back pain    The patient's history and physical exam were reviewed. The risk, benefits, and alternatives of the procedure were discussed and all questions were answered to the patient's satisfaction. The patient agreed to proceed and written informed consent was obtained. POSTOPERATIVE DIAGNOSIS: Lumbar spondylosis    PHYSICIAN:  Dr. Anat Alva DO    ANESTHESIA:  LOCAL    ASSISTANT:  NONE    PATHOLOGY:  NONE    ESTIMATED BLOOD LOSS:  N/A    IMPLANTS:  NONE    PROCEDURE DESCRIPTION: Diagnostic bilateral lumbar medial branch block using fluoroscopy    The patient was placed on the operative bed in prone position. The area was prepped with  Chlorhexidine. The area was then draped in a sterile fashion. Targeted levels: Bilateral lumbar L3, L4, L5 medial branch block    An AP  fluoroscopy image was used to identify and huang Menendez's point at the L3, L4 levels on the targeted side. Additionally, the junction of the SAP and sacral ala was also marked on the same side. A 25-gauge 3-1/2 inch Quincke spinal needle was then advanced toward each of these points under fluoroscopic guidance. Once bone was contacted, negative aspiration was confirmed and 0.5 mL of lidocaine 2% was injected each level. The needles were removed and the needle sites were dressed appropriately. The same procedure was performed on the opposite side. The patient was transferred to the postoperative care unit in stable condition. Written discharge instructions were given to the patient. The patient was given a pain diary upon discharge. COMPLICATIONS:  There were no apparent complications. The patient tolerated the procedure well.

## 2023-11-15 ENCOUNTER — APPOINTMENT (OUTPATIENT)
Dept: GENERAL RADIOLOGY | Age: 33
End: 2023-11-15
Payer: COMMERCIAL

## 2023-11-15 ENCOUNTER — HOSPITAL ENCOUNTER (EMERGENCY)
Age: 33
Discharge: HOME | End: 2023-11-15
Attending: EMERGENCY MEDICINE
Payer: COMMERCIAL

## 2023-11-15 VITALS
DIASTOLIC BLOOD PRESSURE: 89 MMHG | HEART RATE: 82 BPM | OXYGEN SATURATION: 98 % | SYSTOLIC BLOOD PRESSURE: 132 MMHG | TEMPERATURE: 98.2 F | RESPIRATION RATE: 20 BRPM

## 2023-11-15 DIAGNOSIS — R07.89 ATYPICAL CHEST PAIN: Primary | ICD-10-CM

## 2023-11-15 LAB
ALBUMIN SERPL-MCNC: 4.3 G/DL (ref 3.5–5.2)
ALBUMIN/GLOB SERPL: 1.6 {RATIO} (ref 1–2.5)
ALP SERPL-CCNC: 89 U/L (ref 35–104)
ALT SERPL-CCNC: 12 U/L (ref 5–33)
ANION GAP SERPL CALCULATED.3IONS-SCNC: 10 MMOL/L (ref 9–17)
AST SERPL-CCNC: 15 U/L
BACTERIA URNS QL MICRO: ABNORMAL
BASOPHILS # BLD: 0.05 K/UL (ref 0–0.2)
BASOPHILS NFR BLD: 1 % (ref 0–2)
BILIRUB SERPL-MCNC: 0.2 MG/DL (ref 0.3–1.2)
BILIRUB UR QL STRIP: NEGATIVE
BUN SERPL-MCNC: 13 MG/DL (ref 6–20)
BUN/CREAT SERPL: 22 (ref 9–20)
CALCIUM SERPL-MCNC: 9.3 MG/DL (ref 8.6–10.4)
CHLORIDE SERPL-SCNC: 105 MMOL/L (ref 98–107)
CLARITY UR: CLEAR
CO2 SERPL-SCNC: 24 MMOL/L (ref 20–31)
COLOR UR: YELLOW
CREAT SERPL-MCNC: 0.6 MG/DL (ref 0.5–0.9)
D DIMER PPP FEU-MCNC: 0.56 UG/ML FEU (ref 0–0.59)
EKG ATRIAL RATE: 74 BPM
EKG P AXIS: 14 DEGREES
EKG P-R INTERVAL: 136 MS
EKG Q-T INTERVAL: 386 MS
EKG QRS DURATION: 72 MS
EKG QTC CALCULATION (BAZETT): 428 MS
EKG R AXIS: 26 DEGREES
EKG T AXIS: 9 DEGREES
EKG VENTRICULAR RATE: 74 BPM
EOSINOPHIL # BLD: 0.08 K/UL (ref 0–0.44)
EOSINOPHILS RELATIVE PERCENT: 1 % (ref 1–4)
EPI CELLS #/AREA URNS HPF: ABNORMAL /HPF (ref 0–25)
ERYTHROCYTE [DISTWIDTH] IN BLOOD BY AUTOMATED COUNT: 18 % (ref 11.8–14.4)
GFR SERPL CREATININE-BSD FRML MDRD: >60 ML/MIN/1.73M2
GLUCOSE SERPL-MCNC: 93 MG/DL (ref 70–99)
GLUCOSE UR STRIP-MCNC: NEGATIVE MG/DL
HCT VFR BLD AUTO: 37.9 % (ref 36.3–47.1)
HGB BLD-MCNC: 11.4 G/DL (ref 11.9–15.1)
HGB UR QL STRIP.AUTO: ABNORMAL
IMM GRANULOCYTES # BLD AUTO: 0.03 K/UL (ref 0–0.3)
IMM GRANULOCYTES NFR BLD: 0 %
KETONES UR STRIP-MCNC: NEGATIVE MG/DL
LACTATE BLDV-SCNC: 1 MMOL/L (ref 0.5–2.2)
LEUKOCYTE ESTERASE UR QL STRIP: ABNORMAL
LYMPHOCYTES NFR BLD: 2.12 K/UL (ref 1.1–3.7)
LYMPHOCYTES RELATIVE PERCENT: 24 % (ref 24–43)
MCH RBC QN AUTO: 22.8 PG (ref 25.2–33.5)
MCHC RBC AUTO-ENTMCNC: 30.1 G/DL (ref 28.4–34.8)
MCV RBC AUTO: 75.8 FL (ref 82.6–102.9)
MONOCYTES NFR BLD: 0.43 K/UL (ref 0.1–1.2)
MONOCYTES NFR BLD: 5 % (ref 3–12)
MUCOUS THREADS URNS QL MICRO: ABNORMAL
NEUTROPHILS NFR BLD: 69 % (ref 36–65)
NEUTS SEG NFR BLD: 6.14 K/UL (ref 1.5–8.1)
NITRITE UR QL STRIP: NEGATIVE
NRBC BLD-RTO: 0 PER 100 WBC
PH UR STRIP: 7.5 [PH] (ref 5–9)
PLATELET # BLD AUTO: 310 K/UL (ref 138–453)
PMV BLD AUTO: 10.2 FL (ref 8.1–13.5)
POTASSIUM SERPL-SCNC: 3.8 MMOL/L (ref 3.7–5.3)
PROT SERPL-MCNC: 7 G/DL (ref 6.4–8.3)
PROT UR STRIP-MCNC: NEGATIVE MG/DL
RBC # BLD AUTO: 5 M/UL (ref 3.95–5.11)
RBC #/AREA URNS HPF: ABNORMAL /HPF (ref 0–2)
SODIUM SERPL-SCNC: 139 MMOL/L (ref 135–144)
SP GR UR STRIP: 1.02 (ref 1.01–1.02)
TROPONIN I SERPL HS-MCNC: 6 NG/L (ref 0–14)
UROBILINOGEN UR STRIP-ACNC: NORMAL EU/DL (ref 0–1)
WBC #/AREA URNS HPF: ABNORMAL /HPF (ref 0–5)
WBC OTHER # BLD: 8.9 K/UL (ref 3.5–11.3)

## 2023-11-15 PROCEDURE — 85379 FIBRIN DEGRADATION QUANT: CPT

## 2023-11-15 PROCEDURE — 83605 ASSAY OF LACTIC ACID: CPT

## 2023-11-15 PROCEDURE — 81001 URINALYSIS AUTO W/SCOPE: CPT

## 2023-11-15 PROCEDURE — 84484 ASSAY OF TROPONIN QUANT: CPT

## 2023-11-15 PROCEDURE — 99285 EMERGENCY DEPT VISIT HI MDM: CPT

## 2023-11-15 PROCEDURE — 93010 ELECTROCARDIOGRAM REPORT: CPT | Performed by: INTERNAL MEDICINE

## 2023-11-15 PROCEDURE — 36415 COLL VENOUS BLD VENIPUNCTURE: CPT

## 2023-11-15 PROCEDURE — 93005 ELECTROCARDIOGRAM TRACING: CPT | Performed by: EMERGENCY MEDICINE

## 2023-11-15 PROCEDURE — 85025 COMPLETE CBC W/AUTO DIFF WBC: CPT

## 2023-11-15 PROCEDURE — 80053 COMPREHEN METABOLIC PANEL: CPT

## 2023-11-15 PROCEDURE — 71045 X-RAY EXAM CHEST 1 VIEW: CPT

## 2023-11-15 ASSESSMENT — PAIN DESCRIPTION - LOCATION: LOCATION: CHEST

## 2023-11-15 ASSESSMENT — HEART SCORE: ECG: 0

## 2023-11-15 ASSESSMENT — PAIN - FUNCTIONAL ASSESSMENT: PAIN_FUNCTIONAL_ASSESSMENT: 0-10

## 2023-11-15 ASSESSMENT — PAIN SCALES - GENERAL: PAINLEVEL_OUTOF10: 4

## 2023-11-15 NOTE — ED PROVIDER NOTES
[address] [facility] ED  EMERGENCY DEPARTMENT ENCOUNTER      Pt Name: Zana Zarate  MRN: [id number]  [address] [facility] [address] 1990  Date of evaluation: 11/15/2023  Provider: Miguel Oneill MD  Time Note started  9:34 AM EST  11/15/23           NURSING NOTES REVIEWED     Pt evaluated in a timely manner      CURRENT MEDICATIONS       Previous Medications    GABAPENTIN (NEURONTIN) 600 MG TABLET    Take 1 tablet by mouth 3 times daily.     MELOXICAM PO    Take by mouth    OMEPRAZOLE (PRILOSEC) 20 MG DELAYED RELEASE CAPSULE    Take 1 capsule by mouth daily    RIVAROXABAN (XARELTO) 20 MG TABS TABLET    Take 1 tablet by mouth daily (with breakfast)    TAMSULOSIN (FLOMAX) 0.4 MG CAPSULE    Take 1 capsule by mouth daily    TIZANIDINE (ZANAFLEX) 2 MG TABLET    Take 1 tablet by mouth 2 times daily as needed    TRAMADOL (ULTRAM) 50 MG TABLET           ALLERGIES     Cyclobenzaprine, Metoclopramide, and Norco [hydrocodone-acetaminophen]    FAMILY HISTORY       Family History   Problem Relation Age of Onset    Ulcerative Colitis Father     Ulcerative Colitis Sister     Asthma Sister     Heart Disease Maternal Grandfather     Stroke Maternal Grandfather     High Blood Pressure Maternal Grandfather     Arthritis Maternal Grandmother     Vision Loss Maternal Grandmother           SOCIAL HISTORY       Social History     Socioeconomic History    Marital status:      Spouse name: None    Number of children: None    Years of education: None    Highest education level: None   Tobacco Use    Smoking status: Never    Smokeless tobacco: Never   Vaping Use    Vaping Use: Never used   Substance and Sexual Activity    Alcohol use: Not Currently     Comment: SOCIAL    Drug use: No    Sexual activity: Yes     Partners: Male         Social determinants of health impacting treatment or disposition:    (Homelessness, uninsured, no doc, illiterate)             MEDICAL DECISION MAKING AND DIFFERENTIAL DIAGNOSES               Number and Complexity

## 2023-11-28 ENCOUNTER — HOSPITAL ENCOUNTER (OUTPATIENT)
Age: 33
Setting detail: SPECIMEN
Discharge: HOME | End: 2023-11-28

## 2023-12-02 LAB
STONE COMPOSITION: NORMAL
STONE DESCRIPTION: NORMAL
STONE MASS: 51 MG

## 2023-12-13 ENCOUNTER — OFFICE VISIT (OUTPATIENT)
Age: 33
End: 2023-12-13
Payer: COMMERCIAL

## 2023-12-13 VITALS
OXYGEN SATURATION: 98 % | BODY MASS INDEX: 28.52 KG/M2 | SYSTOLIC BLOOD PRESSURE: 132 MMHG | HEART RATE: 103 BPM | WEIGHT: 161 LBS | DIASTOLIC BLOOD PRESSURE: 80 MMHG | RESPIRATION RATE: 19 BRPM

## 2023-12-13 DIAGNOSIS — M51.36 LUMBAR DEGENERATIVE DISC DISEASE: ICD-10-CM

## 2023-12-13 DIAGNOSIS — M79.18 MYOFASCIAL PAIN SYNDROME: ICD-10-CM

## 2023-12-13 DIAGNOSIS — M47.817 LUMBOSACRAL SPONDYLOSIS WITHOUT MYELOPATHY: Primary | ICD-10-CM

## 2023-12-13 PROCEDURE — 99213 OFFICE O/P EST LOW 20 MIN: CPT | Performed by: STUDENT IN AN ORGANIZED HEALTH CARE EDUCATION/TRAINING PROGRAM

## 2023-12-13 PROCEDURE — G8419 CALC BMI OUT NRM PARAM NOF/U: HCPCS | Performed by: STUDENT IN AN ORGANIZED HEALTH CARE EDUCATION/TRAINING PROGRAM

## 2023-12-13 PROCEDURE — G8484 FLU IMMUNIZE NO ADMIN: HCPCS | Performed by: STUDENT IN AN ORGANIZED HEALTH CARE EDUCATION/TRAINING PROGRAM

## 2023-12-13 PROCEDURE — 1036F TOBACCO NON-USER: CPT | Performed by: STUDENT IN AN ORGANIZED HEALTH CARE EDUCATION/TRAINING PROGRAM

## 2023-12-13 PROCEDURE — G8427 DOCREV CUR MEDS BY ELIG CLIN: HCPCS | Performed by: STUDENT IN AN ORGANIZED HEALTH CARE EDUCATION/TRAINING PROGRAM

## 2023-12-13 NOTE — PROGRESS NOTES
Chronic Pain Clinic Note     Encounter Date: 12/13/2023     SUBJECTIVE:  Chief Complaint   Patient presents with    Back Pain       History of Present Illness:   Ellis Ram is a 28 y.o. female who presents for follow up after MBB's. She states that she got good relief with her 1st one, the 2nd one she had a lot of burning/ stinging after the injection and not as good of relief with that one. With the 1st one, she got about an hour of numbness and then 1-2 days of 80% relief. Medication Refill: N/A    Current Complaints of Pain:   Location: Low back  Radiation: None  Severity: Moderate  Pain Numerical Score - 7   Average: 7-8     Highest: 10  Lowest: 4  Character/Quality: Complains of pain that is sharp and stabbing. Timing: Constant  Associated symptoms:   Numbness: No  Weakness: No  Exacerbating factors: standing, bending  Alleviating factors: medications- tramadol from PCP, ice, heat, lying down  Length of time pain has been present: Started on approximately 2 yrs. Ago but reports pain has been consistent since March of 2023  Inciting event/injury: pre term labor, pain started after delivery. Bowel/Bladder incontinence: none  Falls: 1 fall. Patient reports fall happened in 2022, Patient hit head and  reports concussion and vertigo symptoms. Physical Therapy: yes, 6 weeks with [facility] PT June of 2023    History of Interventions:   Surgery: No previous lumbar/cervical surgeries  Injections: epidural, MBB's    Imaging:    MRI with [facility] in July of 2023    Lumbar CT 2/25/2022    FINDINGS:  BONES/ALIGNMENT: There is mild levoconvex alignment of the spine. The  vertebral body heights are maintained, and the posterior elements appear  intact. No osseous destructive lesion is seen. DEGENERATIVE CHANGES: Mild degenerative disc height loss L4 through S1 with  shallow circumferential disc bulges that minimally narrows the spinal canal  at both levels and the lateral recesses at L4-L5.   The neural foramen

## 2024-01-18 ENCOUNTER — HOSPITAL ENCOUNTER (OUTPATIENT)
Age: 34
Setting detail: OUTPATIENT SURGERY
Discharge: HOME | End: 2024-01-18
Attending: STUDENT IN AN ORGANIZED HEALTH CARE EDUCATION/TRAINING PROGRAM | Admitting: STUDENT IN AN ORGANIZED HEALTH CARE EDUCATION/TRAINING PROGRAM
Payer: COMMERCIAL

## 2024-01-18 ENCOUNTER — APPOINTMENT (OUTPATIENT)
Dept: GENERAL RADIOLOGY | Age: 34
End: 2024-01-18
Attending: STUDENT IN AN ORGANIZED HEALTH CARE EDUCATION/TRAINING PROGRAM
Payer: COMMERCIAL

## 2024-01-18 VITALS
RESPIRATION RATE: 12 BRPM | WEIGHT: 165 LBS | TEMPERATURE: 97.5 F | SYSTOLIC BLOOD PRESSURE: 118 MMHG | HEART RATE: 82 BPM | BODY MASS INDEX: 29.23 KG/M2 | DIASTOLIC BLOOD PRESSURE: 83 MMHG | OXYGEN SATURATION: 96 % | HEIGHT: 63 IN

## 2024-01-18 PROCEDURE — 7100000011 HC PHASE II RECOVERY - ADDTL 15 MIN: Performed by: STUDENT IN AN ORGANIZED HEALTH CARE EDUCATION/TRAINING PROGRAM

## 2024-01-18 PROCEDURE — 6360000002 HC RX W HCPCS: Performed by: STUDENT IN AN ORGANIZED HEALTH CARE EDUCATION/TRAINING PROGRAM

## 2024-01-18 PROCEDURE — 2709999900 HC NON-CHARGEABLE SUPPLY: Performed by: STUDENT IN AN ORGANIZED HEALTH CARE EDUCATION/TRAINING PROGRAM

## 2024-01-18 PROCEDURE — 99152 MOD SED SAME PHYS/QHP 5/>YRS: CPT | Performed by: STUDENT IN AN ORGANIZED HEALTH CARE EDUCATION/TRAINING PROGRAM

## 2024-01-18 PROCEDURE — 2500000003 HC RX 250 WO HCPCS: Performed by: STUDENT IN AN ORGANIZED HEALTH CARE EDUCATION/TRAINING PROGRAM

## 2024-01-18 PROCEDURE — 64635 DESTROY LUMB/SAC FACET JNT: CPT | Performed by: STUDENT IN AN ORGANIZED HEALTH CARE EDUCATION/TRAINING PROGRAM

## 2024-01-18 PROCEDURE — 7100000010 HC PHASE II RECOVERY - FIRST 15 MIN: Performed by: STUDENT IN AN ORGANIZED HEALTH CARE EDUCATION/TRAINING PROGRAM

## 2024-01-18 PROCEDURE — 3600000012 HC SURGERY LEVEL 2 ADDTL 15MIN: Performed by: STUDENT IN AN ORGANIZED HEALTH CARE EDUCATION/TRAINING PROGRAM

## 2024-01-18 PROCEDURE — 64636 DESTROY L/S FACET JNT ADDL: CPT | Performed by: STUDENT IN AN ORGANIZED HEALTH CARE EDUCATION/TRAINING PROGRAM

## 2024-01-18 PROCEDURE — 3600000002 HC SURGERY LEVEL 2 BASE: Performed by: STUDENT IN AN ORGANIZED HEALTH CARE EDUCATION/TRAINING PROGRAM

## 2024-01-18 RX ORDER — MIDAZOLAM HYDROCHLORIDE 1 MG/ML
INJECTION INTRAMUSCULAR; INTRAVENOUS PRN
Status: DISCONTINUED | OUTPATIENT
Start: 2024-01-18 | End: 2024-01-18 | Stop reason: ALTCHOICE

## 2024-01-18 RX ORDER — TRIAMCINOLONE ACETONIDE 40 MG/ML
INJECTION, SUSPENSION INTRA-ARTICULAR; INTRAMUSCULAR PRN
Status: DISCONTINUED | OUTPATIENT
Start: 2024-01-18 | End: 2024-01-18 | Stop reason: ALTCHOICE

## 2024-01-18 RX ORDER — LIDOCAINE HYDROCHLORIDE 10 MG/ML
INJECTION, SOLUTION EPIDURAL; INFILTRATION; INTRACAUDAL; PERINEURAL PRN
Status: DISCONTINUED | OUTPATIENT
Start: 2024-01-18 | End: 2024-01-18 | Stop reason: ALTCHOICE

## 2024-01-18 RX ORDER — LIDOCAINE HYDROCHLORIDE 20 MG/ML
INJECTION, SOLUTION EPIDURAL; INFILTRATION; INTRACAUDAL; PERINEURAL PRN
Status: DISCONTINUED | OUTPATIENT
Start: 2024-01-18 | End: 2024-01-18 | Stop reason: ALTCHOICE

## 2024-01-18 ASSESSMENT — PAIN DESCRIPTION - DESCRIPTORS
DESCRIPTORS: NUMBNESS;SHARP
DESCRIPTORS: SORE

## 2024-01-18 ASSESSMENT — PAIN - FUNCTIONAL ASSESSMENT: PAIN_FUNCTIONAL_ASSESSMENT: 0-10

## 2024-01-18 ASSESSMENT — PAIN DESCRIPTION - ONSET: ONSET: ON-GOING

## 2024-01-18 ASSESSMENT — PAIN DESCRIPTION - LOCATION: LOCATION: BACK

## 2024-01-18 ASSESSMENT — PAIN DESCRIPTION - ORIENTATION: ORIENTATION: RIGHT

## 2024-01-18 ASSESSMENT — PAIN SCALES - GENERAL
PAINLEVEL_OUTOF10: 10

## 2024-01-18 ASSESSMENT — PAIN DESCRIPTION - FREQUENCY: FREQUENCY: CONTINUOUS

## 2024-01-18 ASSESSMENT — PAIN DESCRIPTION - PAIN TYPE: TYPE: CHRONIC PAIN

## 2024-01-18 NOTE — FLOWSHEET NOTE
IV Sedation Discharge Criteria    Inpatients must meet Criteria 1 through 7. All other patients are either YES or N/A. If a NO is chosen then Surgeon must be notified.      1.  Minimum 30 minutes after last dose of sedative medication, minimum 120 minutes after last dose of reversal agent.    Yes      2.  Systolic BP stable within 20 mmHg for 30 minutes & systolic BP between 90 & 180 or within 10 mmHg of baseline.    Yes      3.  Pulse between 60 and 100 or within 10 bpm of baseline.    Yes      4.  Spontaneous respiratory rate >/= 10 per minute.    Yes      5.  SaO2 >/= 95 or  >/= baseline.    Yes      6.  Able to cough and swallow or return to baseline function.    Yes      7.  Alert and oriented or return to baseline mental status.    Yes      8.  Demonstrates controlled, coordinated movements, ambulates with steady gait, or return to baseline activity function.    Yes      9.  Minimal or no pain or nausea, or at a level tolerable and acceptable to patient.    Yes      10. Takes and retains oral fluids as allowed.    Yes      11. Procedural / perioperative site stable.  Minimal or no bleeding.    Yes          12. If GI endoscopy procedure, minimal or no abdominal distention or passing flatus.    N/A      13. Written discharge instructions and emergency telephone number provided.    Yes      14. Accompanied by a responsible adult.    Yes

## 2024-01-18 NOTE — DISCHARGE INSTRUCTIONS
SAME DAY SURGERY DISCHARGE INSTRUCTIONS    1.  Do not drive or operate hazardous machinery for 24 hours.    2.  Do not make important personal or business decisions for 24 hours.    3.  Do not drink alcoholic beverages for 24 hours.    4.  Do not smoke tobacco products for 24 hours.    5.  Eat light foods (Jell-O, soups, etc....) and drink plenty of fluids (water, Sprite, etc...) up to 8 glasses per day, as you can tolerate.    6.  If your bandages become soaked with bright red blood, place another dressing pad over your bandages.  (DO NOT remove original bandage.)  Call your surgeon for further instructions.  A small amount of bright red blood is to be expected.    7.  Limit your activities for 24 hours.  Do not engage in heavy work until your surgeon gives you permission.      8.  Patient should not be left alone for 12-24 hours following surgical procedure.    9.  Report the following signs or any questions regarding your physical condition to your surgeon immediately:    Excessive swelling of, or around the wound area.    Redness.    Temperature of 100 degrees (F) or above.    Excessive pain.    10.  Call your surgeon for any questions regarding your surgery.    11.  Wash hands before and after incision care.  It is important to practice good personal hygiene during the post op period.        You have received an injection of local anesthetic and corticosteroids.     The local anesthetic effect typically last 4-6 hours.     It may take 3-7 days for the corticosteroids to reach optimal effect.    Please pay close attention to the following information/instructions:    You may NOT drive for 12 hours after your injection if you had sedation or epidural injection, operate heavy machinery or make important decisions.  It is common to experience mild soreness at the injection site(s) for 24-48 hours. Ice is the best remedy. You may apply ice for 20 minutes at a time several times a day as needed.  Avoid heat to  the injection area for 72 hours. No hot packs, saunas, or steam rooms during this time. A regular shower is OK.  You may immediately restart your regular medication regimen, including pain medications, anti-inflammatory, and blood thinners.  Please remove the sterile dressing/band-aid tonight or tomorrow morning. Do not leave it on after you have taken a shower or gotten it wet.  Corticosteroid side effects can occur after this injection, but they usually resolve after several days. These side effects can include flushing, hot flashes, mild palpitations, insomnia, water retention, feeling anxious/restless, or headaches.  While it is extremely rare to get an infection after a spinal procedure, please call the office if you develop any signs of infection. These signs include fevers/chills, severely increased pain, redness at the injections site, or any drainage from the injection site.  Remember that the corticosteroid benefit (long-term pain relief) from an injection can take as long as 10 days to occur. You may have a period of slightly increased pain after your injection before the cortisone takes effect.  You may resume all of your normal daily activities 24 hours after your injection.  It is OK to restart your exercise or physical therapy program as soon as you feel comfortable doing so.  Please complete the pain diary given to you after your injection. The information you provide on this diary is used to guide your treatment plan.  You should schedule a follow-up visit in 2-3 weeks to review your response to this injection.  Please bring your pain diary with you to this appointment.        Education Materials Received: yes  Belongings Returned: yes    Resume all current outpatient medication(s).         The discharge instructions have been reviewed with the patient and/or Guardian.  Patient and/or Guardian retained a printed copy.

## 2024-01-18 NOTE — OP NOTE
PROCEDURE PERFORMED: Right Lumbar Medial Branch Radiofrequency Ablation using Fluoroscopy    PREOPERATIVE DIAGNOSIS: Lumbosacral spondylosis    INDICATIONS: Chronic low back pain    The patient's history and physical exam were reviewed.  The risk, benefits, and alternatives of the procedure were discussed and all questions were answered to the patient's satisfaction.  The patient agreed to proceed and written informed consent was obtained.    POSTOPERATIVE DIAGNOSIS: Lumbosacral spondylosis    PHYSICIAN:  Dr. Armin Vasquez DO    ANESTHESIA:  LOCAL    ASSISTANT:  NONE    PATHOLOGY:  NONE    ESTIMATED BLOOD LOSS:  N/A    IMPLANTS:  NONE    PROCEDURE DESCRIPTION: Right lumbar medial branch radiofrequency ablation using fluoroscopy    The patient was placed on the operative bed in prone position.  The area was prepped with  Chlorhexidine.  The area was then draped in a sterile fashion.    Targeted levels: Right lumbar L3, L4, L5 medial branch radiofrequency ablation    An AP  fluoroscopy image was used to identify and huang Menendez's point at the L3, L4 levels on the targeted side.  Additionally, the junction of the SAP and sacral ala was also marked on the same side.  The skin and subcutaneous tissues were then anesthetized with 1% lidocaine. At each lumbar medial branch, a 20-gauge, 100 mm curved with 10 mm active tip probe was advanced under AP fluoroscopic projections until bone was contacted along the medial aspect of the transverse process.  Aspiration of each needle was negative for blood, CSF and paresthesia prior to injection.   Motor stimulation at 2 Hz and 1.2 V was negative.  Then, after negative aspiration, 1 mL lidocaine 2% was injected at each level prior to lesioning which was performed at 80°C for 90 seconds.  Once the lesion was complete, injectate solution containing Kenalog 40 mg and 2 mL lidocaine 1% was injected at each site with a total of 1 mL.  The probes were then removed.  The needle

## 2024-01-18 NOTE — H&P
Pain Pre-Op H&P Note    SUBJECTIVE:  No chief complaint on file.      History of Present Illness:   Linda Lennon is a 33 y.o. female who presents with chronic low back pain.    Past Medical History:   Diagnosis Date    Anemia     GERD (gastroesophageal reflux disease)     Kidney stones     Postpartum depression        Past Surgical History:   Procedure Laterality Date    EXTERNAL EAR SURGERY Left     LITHOTRIPSY Right 08/25/2020    ESWL EXTRACORPOREAL SHOCK WAVE LITHOTRIPSY     LITHOTRIPSY Right 8/25/2020    ESWL EXTRACORPOREAL SHOCK WAVE LITHOTRIPSY performed by Rome Lloyd MD at [facility] OR    OTHER SURGICAL HISTORY Right 5/26/2016    right cysto HLL with stent exchange    PAIN MANAGEMENT PROCEDURE N/A 8/17/2023    EPIDURAL STEROID INJECTION-LUMBAR L5-S1 INTULAMENAL EPIDURAL STEROID INJECTION performed by Armin Vasquez DO at [facility] OR    PAIN MANAGEMENT PROCEDURE Bilateral 10/11/2023    LUMBAR MEDIAL BRANCH BLOCK-L3, L4, L5 performed by Armin Vasquez DO at [facility] OR    PAIN MANAGEMENT PROCEDURE Bilateral 11/8/2023    LUMBAR MEDIAL BRANCH BLOCK-L3, L4, L5 performed by Armin Vasquez DO at [facility] OR    TUBAL LIGATION N/A 7/21/2021    TUBAL LIGATION performed by Jesus Barrett MD at [facility] OR    UPPER GASTROINTESTINAL ENDOSCOPY      VAGINA SURGERY      LABIA HEMATOMA       Family History   Problem Relation Age of Onset    Ulcerative Colitis Father     Ulcerative Colitis Sister     Asthma Sister     Heart Disease Maternal Grandfather     Stroke Maternal Grandfather     High Blood Pressure Maternal Grandfather     Arthritis Maternal Grandmother     Vision Loss Maternal Grandmother        Social History     Socioeconomic History    Marital status:      Spouse name: Not on file    Number of children: Not on file    Years of education: Not on file    Highest education level: Not on file   Occupational History    Not on file   Tobacco Use    Smoking status: Never    Smokeless tobacco: Never   Vaping Use

## 2024-01-19 ENCOUNTER — TELEPHONE (OUTPATIENT)
Dept: SURGERY | Age: 34
End: 2024-01-19

## 2024-01-19 NOTE — TELEPHONE ENCOUNTER
LVM for patient advising that this is very normal, needs several more days for ablation to settle. Advised to ED if in severe pain, left [facility] clinic number if she has more concerns.

## 2024-01-19 NOTE — TELEPHONE ENCOUNTER
Pt is contacting office in regards to back ablasin done yesterday. Patient is stating that she has tried the ice and ibuprofen, tylenol, pt is wondering if there is anything that can be sent into the pharmacy to help with the pain. Pain is running back to right leg.     Please advise.

## 2024-01-22 ENCOUNTER — TELEPHONE (OUTPATIENT)
Dept: PAIN MANAGEMENT | Age: 34
End: 2024-01-22

## 2024-01-22 DIAGNOSIS — M47.817 LUMBOSACRAL SPONDYLOSIS WITHOUT MYELOPATHY: Primary | ICD-10-CM

## 2024-01-22 NOTE — TELEPHONE ENCOUNTER
Patient LVM in regards to worsening pain and burning symptoms. She advised on machine she would like to have a call back from the office in regards to her painful symptoms. Patient is questioning if she needs to report to ER. Please advise. Thank you.

## 2024-01-23 RX ORDER — METHYLPREDNISOLONE 4 MG/1
TABLET ORAL
Qty: 1 KIT | Refills: 0 | Status: SHIPPED | OUTPATIENT
Start: 2024-01-23

## 2024-01-23 NOTE — TELEPHONE ENCOUNTER
I sent in some oral steroids. The RFA can cause worsening symptoms for up to a week. The steroids should help calm things down. If she is in severe pain, I recommend the ER.

## 2024-01-23 NOTE — TELEPHONE ENCOUNTER
Attempted to reach patient, no answer therefore I did LVM advising of steroids being sent in. Also advised her that RFA can worsen things for up to 1 week and to report to ED if in severe enough pain

## 2024-01-24 ENCOUNTER — HOSPITAL ENCOUNTER (EMERGENCY)
Age: 34
Discharge: HOME | End: 2024-01-24
Attending: EMERGENCY MEDICINE
Payer: COMMERCIAL

## 2024-01-24 VITALS
OXYGEN SATURATION: 100 % | RESPIRATION RATE: 18 BRPM | HEIGHT: 63 IN | TEMPERATURE: 98.2 F | DIASTOLIC BLOOD PRESSURE: 99 MMHG | HEART RATE: 95 BPM | SYSTOLIC BLOOD PRESSURE: 139 MMHG | WEIGHT: 165 LBS | BODY MASS INDEX: 29.23 KG/M2

## 2024-01-24 DIAGNOSIS — G89.29 ACUTE EXACERBATION OF CHRONIC LOW BACK PAIN: Primary | ICD-10-CM

## 2024-01-24 DIAGNOSIS — M54.50 ACUTE EXACERBATION OF CHRONIC LOW BACK PAIN: Primary | ICD-10-CM

## 2024-01-24 DIAGNOSIS — M54.17 LUMBOSACRAL RADICULOPATHY: ICD-10-CM

## 2024-01-24 PROCEDURE — 99283 EMERGENCY DEPT VISIT LOW MDM: CPT

## 2024-01-24 PROCEDURE — 6370000000 HC RX 637 (ALT 250 FOR IP): Performed by: EMERGENCY MEDICINE

## 2024-01-24 RX ORDER — PREDNISONE 50 MG/1
50 TABLET ORAL DAILY
Qty: 5 TABLET | Refills: 0 | Status: SHIPPED | OUTPATIENT
Start: 2024-01-24 | End: 2024-01-29

## 2024-01-24 RX ORDER — TRAMADOL HYDROCHLORIDE 50 MG/1
50 TABLET ORAL EVERY 8 HOURS PRN
Qty: 9 TABLET | Refills: 0 | Status: SHIPPED | OUTPATIENT
Start: 2024-01-24 | End: 2024-01-27

## 2024-01-24 RX ORDER — TRAMADOL HYDROCHLORIDE 50 MG/1
50 TABLET ORAL ONCE
Status: COMPLETED | OUTPATIENT
Start: 2024-01-24 | End: 2024-01-24

## 2024-01-24 RX ADMIN — TRAMADOL HYDROCHLORIDE 50 MG: 50 TABLET, COATED ORAL at 16:44

## 2024-01-24 RX ADMIN — PREDNISONE 50 MG: 10 TABLET ORAL at 16:44

## 2024-01-24 ASSESSMENT — ENCOUNTER SYMPTOMS
SORE THROAT: 0
ABDOMINAL DISTENTION: 0
SHORTNESS OF BREATH: 0
BACK PAIN: 0

## 2024-01-24 ASSESSMENT — PAIN DESCRIPTION - LOCATION: LOCATION: BACK

## 2024-01-24 ASSESSMENT — PAIN SCALES - GENERAL: PAINLEVEL_OUTOF10: 9

## 2024-01-24 ASSESSMENT — PAIN DESCRIPTION - ORIENTATION: ORIENTATION: LOWER

## 2024-01-24 ASSESSMENT — PAIN - FUNCTIONAL ASSESSMENT: PAIN_FUNCTIONAL_ASSESSMENT: 0-10

## 2024-01-24 ASSESSMENT — PAIN DESCRIPTION - DESCRIPTORS: DESCRIPTORS: BURNING;SHOOTING;STABBING

## 2024-01-24 NOTE — ED PROVIDER NOTES
kg (165 lb)       Physical Exam  Constitutional:       General: She is not in acute distress.     Appearance: Normal appearance. She is not toxic-appearing.   HENT:      Head: Normocephalic and atraumatic.      Mouth/Throat:      Mouth: Mucous membranes are moist.   Eyes:      Extraocular Movements: Extraocular movements intact.      Pupils: Pupils are equal, round, and reactive to light.   Cardiovascular:      Rate and Rhythm: Normal rate and regular rhythm.      Pulses: Normal pulses.      Heart sounds: Normal heart sounds.   Pulmonary:      Effort: Pulmonary effort is normal.      Breath sounds: Normal breath sounds.   Abdominal:      General: Abdomen is flat. Bowel sounds are normal.      Palpations: Abdomen is soft.   Musculoskeletal:         General: Normal range of motion.      Comments: Patient has good reflexes of bilateral lower extremities.  Intact strength and sensation of both lower extremities.   Skin:     General: Skin is warm and dry.      Capillary Refill: Capillary refill takes less than 2 seconds.   Neurological:      General: No focal deficit present.      Mental Status: She is alert and oriented to person, place, and time.   Psychiatric:         Mood and Affect: Mood normal.         DIAGNOSTIC RESULTS     EKG: All EKG's are interpreted by the Emergency Department Physician who either signs or Co-signs this chart in the absence of a cardiologist.        RADIOLOGY:   Non-plain film images such as CT, Ultrasound and MRI are read by the radiologist. Plain radiographic images are visualized and preliminarily interpreted by the emergency physician with the below findings:        Interpretation per the Radiologist below, if available at the time of this note:    No orders to display         ED BEDSIDE ULTRASOUND:   Performed by ED Physician - none    LABS:  Labs Reviewed - No data to display    All other labs were within normal range or not returned as of this dictation.    EMERGENCY DEPARTMENT COURSE

## 2024-01-24 NOTE — DISCHARGE INSTRUCTIONS
You can increase your gabapentin to 600 mg twice a day and then 900 mg at bedtime.  Take the prednisone as prescribed with food.  Tramadol for breakthrough pain as needed.  Follow-up with your pain doctor and your primary care doctor in the next 1 to 2 days.

## 2024-03-13 ENCOUNTER — TELEPHONE (OUTPATIENT)
Dept: SURGERY | Age: 34
End: 2024-03-13

## 2024-03-13 NOTE — TELEPHONE ENCOUNTER
Patient called asking about rescheduling her procedure with Dr. Vasquez. Patient says it was originally scheduled in Feb but was cancelled due to the doctor being ill.

## 2024-04-05 ENCOUNTER — HOSPITAL ENCOUNTER (OUTPATIENT)
Age: 34
Discharge: HOME | End: 2024-04-05
Payer: COMMERCIAL

## 2024-04-05 LAB
ALBUMIN SERPL-MCNC: 4.3 G/DL (ref 3.5–5.2)
ALBUMIN/GLOB SERPL: 1.2 {RATIO} (ref 1–2.5)
ALP SERPL-CCNC: 104 U/L (ref 35–104)
ALT SERPL-CCNC: 15 U/L (ref 5–33)
ANION GAP SERPL CALCULATED.3IONS-SCNC: 7 MMOL/L (ref 9–17)
AST SERPL-CCNC: 17 U/L
BASOPHILS # BLD: 0.06 K/UL (ref 0–0.2)
BASOPHILS NFR BLD: 1 % (ref 0–2)
BILIRUB SERPL-MCNC: 0.2 MG/DL (ref 0.3–1.2)
BUN SERPL-MCNC: 12 MG/DL (ref 6–20)
BUN/CREAT SERPL: 17 (ref 9–20)
CALCIUM SERPL-MCNC: 9.4 MG/DL (ref 8.6–10.4)
CHLORIDE SERPL-SCNC: 105 MMOL/L (ref 98–107)
CHOLEST SERPL-MCNC: 183 MG/DL (ref 0–199)
CHOLESTEROL/HDL RATIO: 4
CO2 SERPL-SCNC: 27 MMOL/L (ref 20–31)
CREAT SERPL-MCNC: 0.7 MG/DL (ref 0.5–0.9)
EOSINOPHIL # BLD: 0.08 K/UL (ref 0–0.44)
EOSINOPHILS RELATIVE PERCENT: 1 % (ref 1–4)
ERYTHROCYTE [DISTWIDTH] IN BLOOD BY AUTOMATED COUNT: 15 % (ref 11.8–14.4)
FERRITIN SERPL-MCNC: 12 NG/ML (ref 13–150)
GFR SERPL CREATININE-BSD FRML MDRD: >90 ML/MIN/1.73M2
GLUCOSE SERPL-MCNC: 84 MG/DL (ref 70–99)
HCT VFR BLD AUTO: 41.6 % (ref 36.3–47.1)
HDLC SERPL-MCNC: 48 MG/DL
HGB BLD-MCNC: 12.8 G/DL (ref 11.9–15.1)
IMM GRANULOCYTES # BLD AUTO: <0.03 K/UL (ref 0–0.3)
IMM GRANULOCYTES NFR BLD: 0 %
IRON SATN MFR SERPL: 9 % (ref 20–55)
IRON SERPL-MCNC: 38 UG/DL (ref 37–145)
LDLC SERPL CALC-MCNC: 109 MG/DL (ref 0–100)
LYMPHOCYTES NFR BLD: 2.55 K/UL (ref 1.1–3.7)
LYMPHOCYTES RELATIVE PERCENT: 30 % (ref 24–43)
MCH RBC QN AUTO: 25.2 PG (ref 25.2–33.5)
MCHC RBC AUTO-ENTMCNC: 30.8 G/DL (ref 28.4–34.8)
MCV RBC AUTO: 81.9 FL (ref 82.6–102.9)
MONOCYTES NFR BLD: 0.48 K/UL (ref 0.1–1.2)
MONOCYTES NFR BLD: 6 % (ref 3–12)
NEUTROPHILS NFR BLD: 62 % (ref 36–65)
NEUTS SEG NFR BLD: 5.25 K/UL (ref 1.5–8.1)
NRBC BLD-RTO: 0 PER 100 WBC
PLATELET # BLD AUTO: 317 K/UL (ref 138–453)
PMV BLD AUTO: 10.1 FL (ref 8.1–13.5)
POTASSIUM SERPL-SCNC: 4.4 MMOL/L (ref 3.7–5.3)
PROT SERPL-MCNC: 7.8 G/DL (ref 6.4–8.3)
RBC # BLD AUTO: 5.08 M/UL (ref 3.95–5.11)
SODIUM SERPL-SCNC: 139 MMOL/L (ref 135–144)
T4 FREE SERPL-MCNC: 0.9 NG/DL (ref 0.92–1.68)
TIBC SERPL-MCNC: 404 UG/DL (ref 250–450)
TRIGL SERPL-MCNC: 130 MG/DL
TSH SERPL DL<=0.05 MIU/L-ACNC: 0.74 UIU/ML (ref 0.3–5)
UNSATURATED IRON BINDING CAPACITY: 366 UG/DL (ref 112–347)
VLDLC SERPL CALC-MCNC: 26 MG/DL
WBC OTHER # BLD: 8.4 K/UL (ref 3.5–11.3)

## 2024-04-05 PROCEDURE — 82728 ASSAY OF FERRITIN: CPT

## 2024-04-05 PROCEDURE — 80061 LIPID PANEL: CPT

## 2024-04-05 PROCEDURE — 85025 COMPLETE CBC W/AUTO DIFF WBC: CPT

## 2024-04-05 PROCEDURE — 84439 ASSAY OF FREE THYROXINE: CPT

## 2024-04-05 PROCEDURE — 80053 COMPREHEN METABOLIC PANEL: CPT

## 2024-04-05 PROCEDURE — 84443 ASSAY THYROID STIM HORMONE: CPT

## 2024-04-05 PROCEDURE — 36415 COLL VENOUS BLD VENIPUNCTURE: CPT

## 2024-04-05 PROCEDURE — 83540 ASSAY OF IRON: CPT

## 2024-04-05 PROCEDURE — 83550 IRON BINDING TEST: CPT

## 2024-04-19 ENCOUNTER — OFFICE VISIT (OUTPATIENT)
Dept: ONCOLOGY | Age: 34
End: 2024-04-19
Payer: COMMERCIAL

## 2024-04-19 VITALS
TEMPERATURE: 98.7 F | SYSTOLIC BLOOD PRESSURE: 121 MMHG | RESPIRATION RATE: 18 BRPM | WEIGHT: 167 LBS | DIASTOLIC BLOOD PRESSURE: 76 MMHG | BODY MASS INDEX: 29.58 KG/M2 | HEART RATE: 92 BPM

## 2024-04-19 DIAGNOSIS — I26.94 MULTIPLE SUBSEGMENTAL PULMONARY EMBOLI WITHOUT ACUTE COR PULMONALE (HCC): Primary | ICD-10-CM

## 2024-04-19 DIAGNOSIS — D50.0 IRON DEFICIENCY ANEMIA DUE TO CHRONIC BLOOD LOSS: ICD-10-CM

## 2024-04-19 PROBLEM — D50.9 IRON DEFICIENCY ANEMIA: Status: ACTIVE | Noted: 2024-04-19

## 2024-04-19 PROCEDURE — 99214 OFFICE O/P EST MOD 30 MIN: CPT | Performed by: INTERNAL MEDICINE

## 2024-04-19 PROCEDURE — G8427 DOCREV CUR MEDS BY ELIG CLIN: HCPCS | Performed by: INTERNAL MEDICINE

## 2024-04-19 PROCEDURE — G8419 CALC BMI OUT NRM PARAM NOF/U: HCPCS | Performed by: INTERNAL MEDICINE

## 2024-04-19 PROCEDURE — 1036F TOBACCO NON-USER: CPT | Performed by: INTERNAL MEDICINE

## 2024-04-19 RX ORDER — FERROUS SULFATE 325(65) MG
1 TABLET ORAL DAILY
COMMUNITY
Start: 2024-04-11

## 2024-04-19 RX ORDER — IBUPROFEN 800 MG/1
TABLET ORAL
COMMUNITY
Start: 2023-02-28

## 2024-04-19 NOTE — PROGRESS NOTES
negative for nausea, vomiting, diarrhea, constipation, abdominal pain, Dysphagia, hematemesis and hematochezia   Genitourinary: negative for frequency, dysuria, nocturia, urinary incontinence, and hematuria   Integument: negative for rash, skin lesions, bruises.   Hematologic/Lymphatic: negative for easy bruising, bleeding, lymphadenopathy, petechiae and swelling/edema   Endocrine: negative for heat or cold intolerance, tremor, weight changes, change in bowel habits and hair loss   Musculoskeletal: negative for myalgias, arthralgias, pain, joint swelling,and bone pain   Neurological: negative for headaches, dizziness, seizures, weakness, numbness       OBJECTIVE:         Vitals:    04/19/24 1418   BP: 121/76   Pulse: 92   Resp: 18   Temp: 98.7 °F (37.1 °C)       PHYSICAL EXAM:   General appearance - well appearing, no in pain or distress   Mental status - alert and cooperative   Eyes - pupils equal and reactive, extraocular eye movements intact   Ears - bilateral TM's and external ear canals normal   Mouth - mucous membranes moist, pharynx normal without lesions   Neck - supple, no significant adenopathy   Lymphatics - no palpable lymphadenopathy, no hepatosplenomegaly   Chest - clear to auscultation, no wheezes, rales or rhonchi, symmetric air entry   Heart - normal rate, regular rhythm, normal S1, S2, no murmurs, rubs, clicks or gallops   Abdomen - soft, nontender, nondistended, no masses or organomegaly   Neurological - alert, oriented, normal speech, no focal findings or movement disorder noted   Musculoskeletal - no joint tenderness, deformity or swelling   Extremities - peripheral pulses normal, no pedal edema, no clubbing or cyanosis   Skin - normal coloration and turgor, no rashes, no suspicious skin lesions noted ,      LABORATORY DATA:     Lab Results   Component Value Date    WBC 8.4 04/05/2024    HGB 12.8 04/05/2024    HCT 41.6 04/05/2024    MCV 81.9 (L) 04/05/2024     04/05/2024    LYMPHOPCT 30

## 2024-05-16 ENCOUNTER — HOSPITAL ENCOUNTER (EMERGENCY)
Age: 34
Discharge: HOME | End: 2024-05-16
Attending: STUDENT IN AN ORGANIZED HEALTH CARE EDUCATION/TRAINING PROGRAM
Payer: COMMERCIAL

## 2024-05-16 VITALS
OXYGEN SATURATION: 100 % | HEART RATE: 93 BPM | BODY MASS INDEX: 29.41 KG/M2 | RESPIRATION RATE: 18 BRPM | HEIGHT: 63 IN | SYSTOLIC BLOOD PRESSURE: 131 MMHG | WEIGHT: 166 LBS | DIASTOLIC BLOOD PRESSURE: 83 MMHG | TEMPERATURE: 98.2 F

## 2024-05-16 DIAGNOSIS — G89.29 ACUTE EXACERBATION OF CHRONIC LOW BACK PAIN: Primary | ICD-10-CM

## 2024-05-16 DIAGNOSIS — M54.50 ACUTE EXACERBATION OF CHRONIC LOW BACK PAIN: Primary | ICD-10-CM

## 2024-05-16 LAB
BACTERIA URNS QL MICRO: ABNORMAL
BILIRUB UR QL STRIP: NEGATIVE
CLARITY UR: CLEAR
COLOR UR: YELLOW
EPI CELLS #/AREA URNS HPF: ABNORMAL /HPF (ref 0–25)
GLUCOSE UR STRIP-MCNC: NEGATIVE MG/DL
HGB UR QL STRIP.AUTO: NEGATIVE
KETONES UR STRIP-MCNC: NEGATIVE MG/DL
LEUKOCYTE ESTERASE UR QL STRIP: NEGATIVE
MUCOUS THREADS URNS QL MICRO: ABNORMAL
NITRITE UR QL STRIP: NEGATIVE
PH UR STRIP: 6 [PH] (ref 5–9)
PROT UR STRIP-MCNC: NEGATIVE MG/DL
RBC #/AREA URNS HPF: ABNORMAL /HPF (ref 0–2)
SP GR UR STRIP: 1.02 (ref 1.01–1.02)
UROBILINOGEN UR STRIP-ACNC: NORMAL EU/DL (ref 0–1)
WBC #/AREA URNS HPF: ABNORMAL /HPF (ref 0–5)

## 2024-05-16 PROCEDURE — 6360000002 HC RX W HCPCS: Performed by: STUDENT IN AN ORGANIZED HEALTH CARE EDUCATION/TRAINING PROGRAM

## 2024-05-16 PROCEDURE — 96372 THER/PROPH/DIAG INJ SC/IM: CPT

## 2024-05-16 PROCEDURE — 99284 EMERGENCY DEPT VISIT MOD MDM: CPT

## 2024-05-16 PROCEDURE — 6370000000 HC RX 637 (ALT 250 FOR IP): Performed by: STUDENT IN AN ORGANIZED HEALTH CARE EDUCATION/TRAINING PROGRAM

## 2024-05-16 PROCEDURE — 81001 URINALYSIS AUTO W/SCOPE: CPT

## 2024-05-16 RX ORDER — ACETAMINOPHEN 500 MG
1000 TABLET ORAL ONCE
Status: COMPLETED | OUTPATIENT
Start: 2024-05-16 | End: 2024-05-16

## 2024-05-16 RX ORDER — DIAZEPAM 5 MG/1
5 TABLET ORAL ONCE
Status: COMPLETED | OUTPATIENT
Start: 2024-05-16 | End: 2024-05-16

## 2024-05-16 RX ORDER — METHOCARBAMOL 500 MG/1
TABLET, FILM COATED ORAL
COMMUNITY
Start: 2024-03-26

## 2024-05-16 RX ORDER — PREDNISONE 20 MG/1
40 TABLET ORAL DAILY
Qty: 8 TABLET | Refills: 0 | Status: SHIPPED | OUTPATIENT
Start: 2024-05-16 | End: 2024-05-20

## 2024-05-16 RX ORDER — ORPHENADRINE CITRATE 30 MG/ML
60 INJECTION INTRAMUSCULAR; INTRAVENOUS ONCE
Status: COMPLETED | OUTPATIENT
Start: 2024-05-16 | End: 2024-05-16

## 2024-05-16 RX ORDER — KETOROLAC TROMETHAMINE 30 MG/ML
30 INJECTION, SOLUTION INTRAMUSCULAR; INTRAVENOUS ONCE
Status: COMPLETED | OUTPATIENT
Start: 2024-05-16 | End: 2024-05-16

## 2024-05-16 RX ORDER — PREDNISONE 20 MG/1
60 TABLET ORAL ONCE
Status: COMPLETED | OUTPATIENT
Start: 2024-05-16 | End: 2024-05-16

## 2024-05-16 RX ORDER — LIDOCAINE 4 G/G
1 PATCH TOPICAL ONCE
Status: DISCONTINUED | OUTPATIENT
Start: 2024-05-16 | End: 2024-05-16 | Stop reason: HOSPADM

## 2024-05-16 RX ADMIN — KETOROLAC TROMETHAMINE 30 MG: 30 INJECTION, SOLUTION INTRAMUSCULAR at 19:48

## 2024-05-16 RX ADMIN — ORPHENADRINE CITRATE 60 MG: 30 INJECTION, SOLUTION INTRAMUSCULAR; INTRAVENOUS at 21:06

## 2024-05-16 RX ADMIN — PREDNISONE 60 MG: 20 TABLET ORAL at 19:49

## 2024-05-16 RX ADMIN — DIAZEPAM 5 MG: 5 TABLET ORAL at 19:49

## 2024-05-16 RX ADMIN — ACETAMINOPHEN 1000 MG: 500 TABLET ORAL at 19:49

## 2024-05-16 ASSESSMENT — PAIN - FUNCTIONAL ASSESSMENT
PAIN_FUNCTIONAL_ASSESSMENT: 0-10
PAIN_FUNCTIONAL_ASSESSMENT: 0-10

## 2024-05-16 ASSESSMENT — PAIN SCALES - GENERAL
PAINLEVEL_OUTOF10: 8
PAINLEVEL_OUTOF10: 7

## 2024-05-16 NOTE — ED TRIAGE NOTES
Pt c/o back pain, Herniated disc L5 S1- appointment June 5 with specialist, denies loss of control of bowel or bladder

## 2024-05-16 NOTE — ED PROVIDER NOTES
[facility]  EMERGENCY DEPARTMENT ENCOUNTER      Pt Name: Linda Lennon  MRN: [id number]  Birthdate 1990  Date of evaluation: 5/16/2024  Provider: Nacho Goins MD    CHIEF COMPLAINT       Chief Complaint   Patient presents with   • Back Pain     Herniated disc L5 S1- appointment June 5     HISTORY OF PRESENT ILLNESS      Linda Lennon is a 33 y.o. female who presents to the emergency department ***     Back pain which has been ongoing for extended period of time.  She has known history of L5-S1 herniated disc.  She has had epidural steroid injections last year and also had a ablation performed on the right in January.  She was supposed to have another one performed on the left but this is kept getting rescheduled.    Her next appointment is June 5    Taking tramadol, gabapentin, ibuprofen at home.        REVIEW OF SYSTEMS       Review of Systems      PAST MEDICAL HISTORY     Past Medical History:   Diagnosis Date   • Anemia    • GERD (gastroesophageal reflux disease)    • Kidney stones    • Postpartum depression          SURGICAL HISTORY       Past Surgical History:   Procedure Laterality Date   • EXTERNAL EAR SURGERY Left    • LITHOTRIPSY Right 08/25/2020    ESWL EXTRACORPOREAL SHOCK WAVE LITHOTRIPSY    • LITHOTRIPSY Right 8/25/2020    ESWL EXTRACORPOREAL SHOCK WAVE LITHOTRIPSY performed by Rome Lloyd MD at [facility] OR   • OTHER SURGICAL HISTORY Right 5/26/2016    right cysto HLL with stent exchange   • PAIN MANAGEMENT PROCEDURE N/A 8/17/2023    EPIDURAL STEROID INJECTION-LUMBAR L5-S1 INTULAMENAL EPIDURAL STEROID INJECTION performed by Armin Vasquez DO at [facility] OR   • PAIN MANAGEMENT PROCEDURE Bilateral 10/11/2023    LUMBAR MEDIAL BRANCH BLOCK-L3, L4, L5 performed by Armin Vasquez DO at [facility] OR   • PAIN MANAGEMENT PROCEDURE Bilateral 11/8/2023    LUMBAR MEDIAL BRANCH BLOCK-L3, L4, L5 performed by Armin Vasquez DO at [facility] OR   • PAIN MANAGEMENT PROCEDURE Right 1/18/2024    
  predniSONE (DELTASONE) 20 MG tablet Take 2 tablets by mouth daily for 4 days, Disp-8 tablet, R-0Print             (Please note that portions of this note were completed with a voice recognition program.  Efforts were made to edit the dictations but occasionally words are mis-transcribed.)    Nacho Goins MD (electronically signed)  Attending Emergency Physician           Nacho Goins MD  Resident  05/17/24 [id number]

## 2024-05-17 ASSESSMENT — ENCOUNTER SYMPTOMS
ABDOMINAL PAIN: 0
BACK PAIN: 1

## 2024-05-17 NOTE — DISCHARGE INSTRUCTIONS
Call today or tomorrow to follow up with Lizett Longoria APRN - CNP  in 3 days.    You were started on Prednisone burst. Please take as scheduled.  You may also continue to take your previously prescribed pain medication for your chronic back pain    Use an ice pack or bag filled with ice and apply to the injured area 3 - 4 times a day for 15 - 20 minutes each time.  If the injury is older than 3 days, then use a heating pad to help relax the muscles in your back.    Tylenol can be taken every 6 hours. Ibuprofen can be taken every 6 hours. It is recommended you alternate the two every three hours.     Example pain medication schedule:  - 9am: Tylenol (500-1000mg)  - 12 pm/noon: Ibuprofen (400-600mg)  - 3pm: Tylenol  - 6pm: Ibuprofen    Maximum dose of tylenol in a 24 hour period is 4000mg.     Return to the Emergency Department for inability to move legs, worsening of pain, tingling / loss of sensation, any other care or concern.

## 2024-09-09 ENCOUNTER — HOSPITAL ENCOUNTER (OUTPATIENT)
Dept: GENERAL RADIOLOGY | Age: 34
Discharge: HOME | End: 2024-09-11
Payer: COMMERCIAL

## 2024-09-09 ENCOUNTER — HOSPITAL ENCOUNTER (OUTPATIENT)
Age: 34
Discharge: HOME | End: 2024-09-11
Payer: COMMERCIAL

## 2024-09-09 DIAGNOSIS — N20.0 RENAL CALCULUS: ICD-10-CM

## 2024-09-09 PROCEDURE — 74019 RADEX ABDOMEN 2 VIEWS: CPT

## 2024-10-02 ENCOUNTER — HOSPITAL ENCOUNTER (OUTPATIENT)
Age: 34
Discharge: HOME | End: 2024-10-02
Payer: COMMERCIAL

## 2024-10-02 LAB
ALBUMIN SERPL-MCNC: 4.2 G/DL (ref 3.5–5.2)
ALBUMIN/GLOB SERPL: 1.4 {RATIO} (ref 1–2.5)
ALP SERPL-CCNC: 99 U/L (ref 35–104)
ALT SERPL-CCNC: 15 U/L (ref 10–35)
ANION GAP SERPL CALCULATED.3IONS-SCNC: 11 MMOL/L (ref 9–16)
AST SERPL-CCNC: 21 U/L (ref 10–35)
BILIRUB SERPL-MCNC: 0.2 MG/DL (ref 0–1.2)
BUN SERPL-MCNC: 12 MG/DL (ref 6–20)
BUN/CREAT SERPL: 15 (ref 9–20)
CALCIUM SERPL-MCNC: 9.3 MG/DL (ref 8.6–10.4)
CHLORIDE SERPL-SCNC: 102 MMOL/L (ref 98–107)
CO2 SERPL-SCNC: 26 MMOL/L (ref 20–31)
CREAT SERPL-MCNC: 0.8 MG/DL (ref 0.5–0.9)
CRP SERPL HS-MCNC: 3.9 MG/L (ref 0–5)
ERYTHROCYTE [DISTWIDTH] IN BLOOD BY AUTOMATED COUNT: 12.2 % (ref 11.8–14.4)
ERYTHROCYTE [SEDIMENTATION RATE] IN BLOOD BY PHOTOMETRIC METHOD: 10 MM/HR (ref 0–20)
GFR, ESTIMATED: >90 ML/MIN/1.73M2
GLUCOSE SERPL-MCNC: 79 MG/DL (ref 74–99)
HCT VFR BLD AUTO: 45 % (ref 36.3–47.1)
HGB BLD-MCNC: 14.9 G/DL (ref 11.9–15.1)
MCH RBC QN AUTO: 30.3 PG (ref 25.2–33.5)
MCHC RBC AUTO-ENTMCNC: 33.1 G/DL (ref 28.4–34.8)
MCV RBC AUTO: 91.6 FL (ref 82.6–102.9)
NRBC BLD-RTO: 0 PER 100 WBC
PLATELET # BLD AUTO: 257 K/UL (ref 138–453)
PMV BLD AUTO: 10.9 FL (ref 8.1–13.5)
POTASSIUM SERPL-SCNC: 3.6 MMOL/L (ref 3.7–5.3)
PROT SERPL-MCNC: 7.1 G/DL (ref 6.6–8.7)
RBC # BLD AUTO: 4.91 M/UL (ref 3.95–5.11)
SODIUM SERPL-SCNC: 139 MMOL/L (ref 136–145)
TSH SERPL DL<=0.05 MIU/L-ACNC: 0.92 UIU/ML (ref 0.27–4.2)
WBC OTHER # BLD: 12.3 K/UL (ref 3.5–11.3)

## 2024-10-02 PROCEDURE — 84439 ASSAY OF FREE THYROXINE: CPT

## 2024-10-02 PROCEDURE — 85027 COMPLETE CBC AUTOMATED: CPT

## 2024-10-02 PROCEDURE — 36415 COLL VENOUS BLD VENIPUNCTURE: CPT

## 2024-10-02 PROCEDURE — 86140 C-REACTIVE PROTEIN: CPT

## 2024-10-02 PROCEDURE — 83036 HEMOGLOBIN GLYCOSYLATED A1C: CPT

## 2024-10-02 PROCEDURE — 82306 VITAMIN D 25 HYDROXY: CPT

## 2024-10-02 PROCEDURE — 84480 ASSAY TRIIODOTHYRONINE (T3): CPT

## 2024-10-02 PROCEDURE — 84436 ASSAY OF TOTAL THYROXINE: CPT

## 2024-10-02 PROCEDURE — 80053 COMPREHEN METABOLIC PANEL: CPT

## 2024-10-02 PROCEDURE — 84481 FREE ASSAY (FT-3): CPT

## 2024-10-02 PROCEDURE — 84443 ASSAY THYROID STIM HORMONE: CPT

## 2024-10-02 PROCEDURE — 85652 RBC SED RATE AUTOMATED: CPT

## 2024-10-03 LAB
25(OH)D3 SERPL-MCNC: 31.1 NG/ML (ref 30–100)
EST. AVERAGE GLUCOSE BLD GHB EST-MCNC: 94 MG/DL
HBA1C MFR BLD: 4.9 % (ref 4–6)
T3 SERPL-MCNC: 98 NG/DL (ref 80–200)
T3FREE SERPL-MCNC: 2.5 PG/ML (ref 2–4.4)
T4 FREE SERPL-MCNC: 1.2 NG/DL (ref 0.92–1.68)
T4 SERPL-MCNC: 7 UG/DL (ref 4.5–11.7)

## 2024-10-10 ENCOUNTER — APPOINTMENT (OUTPATIENT)
Dept: CT IMAGING | Age: 34
End: 2024-10-10
Payer: COMMERCIAL

## 2024-10-10 ENCOUNTER — HOSPITAL ENCOUNTER (EMERGENCY)
Age: 34
Discharge: HOME | End: 2024-10-10
Payer: COMMERCIAL

## 2024-10-10 VITALS
OXYGEN SATURATION: 99 % | SYSTOLIC BLOOD PRESSURE: 164 MMHG | RESPIRATION RATE: 20 BRPM | TEMPERATURE: 98.2 F | DIASTOLIC BLOOD PRESSURE: 117 MMHG | WEIGHT: 165 LBS | HEIGHT: 63 IN | HEART RATE: 109 BPM | BODY MASS INDEX: 29.23 KG/M2

## 2024-10-10 DIAGNOSIS — R10.9 ACUTE RIGHT FLANK PAIN: Primary | ICD-10-CM

## 2024-10-10 LAB
ALBUMIN SERPL-MCNC: 4.2 G/DL (ref 3.5–5.2)
ALBUMIN/GLOB SERPL: 1.5 {RATIO} (ref 1–2.5)
ALP SERPL-CCNC: 99 U/L (ref 35–104)
ALT SERPL-CCNC: 14 U/L (ref 10–35)
ANION GAP SERPL CALCULATED.3IONS-SCNC: 9 MMOL/L (ref 9–16)
AST SERPL-CCNC: 20 U/L (ref 10–35)
BACTERIA URNS QL MICRO: ABNORMAL
BASOPHILS # BLD: 0.04 K/UL (ref 0–0.2)
BASOPHILS NFR BLD: 0 % (ref 0–2)
BILIRUB SERPL-MCNC: 0.4 MG/DL (ref 0–1.2)
BILIRUB UR QL STRIP: NEGATIVE
BUN SERPL-MCNC: 11 MG/DL (ref 6–20)
BUN/CREAT SERPL: 14 (ref 9–20)
CALCIUM SERPL-MCNC: 9.4 MG/DL (ref 8.6–10.4)
CHLORIDE SERPL-SCNC: 104 MMOL/L (ref 98–107)
CLARITY UR: CLEAR
CO2 SERPL-SCNC: 29 MMOL/L (ref 20–31)
COLOR UR: YELLOW
CREAT SERPL-MCNC: 0.8 MG/DL (ref 0.5–0.9)
EOSINOPHIL # BLD: 0.06 K/UL (ref 0–0.44)
EOSINOPHILS RELATIVE PERCENT: 1 % (ref 1–4)
EPI CELLS #/AREA URNS HPF: ABNORMAL /HPF (ref 0–25)
ERYTHROCYTE [DISTWIDTH] IN BLOOD BY AUTOMATED COUNT: 12.2 % (ref 11.8–14.4)
GFR, ESTIMATED: >90 ML/MIN/1.73M2
GLUCOSE SERPL-MCNC: 94 MG/DL (ref 74–99)
GLUCOSE UR STRIP-MCNC: NEGATIVE MG/DL
HCG UR QL: NEGATIVE
HCT VFR BLD AUTO: 45.3 % (ref 36.3–47.1)
HGB BLD-MCNC: 15 G/DL (ref 11.9–15.1)
HGB UR QL STRIP.AUTO: ABNORMAL
IMM GRANULOCYTES # BLD AUTO: <0.03 K/UL (ref 0–0.3)
IMM GRANULOCYTES NFR BLD: 0 %
KETONES UR STRIP-MCNC: NEGATIVE MG/DL
LEUKOCYTE ESTERASE UR QL STRIP: ABNORMAL
LIPASE SERPL-CCNC: 38 U/L (ref 13–60)
LYMPHOCYTES NFR BLD: 2.74 K/UL (ref 1.1–3.7)
LYMPHOCYTES RELATIVE PERCENT: 28 % (ref 24–43)
MCH RBC QN AUTO: 30.5 PG (ref 25.2–33.5)
MCHC RBC AUTO-ENTMCNC: 33.1 G/DL (ref 28.4–34.8)
MCV RBC AUTO: 92.3 FL (ref 82.6–102.9)
MONOCYTES NFR BLD: 0.46 K/UL (ref 0.1–1.2)
MONOCYTES NFR BLD: 5 % (ref 3–12)
MUCOUS THREADS URNS QL MICRO: ABNORMAL
NEUTROPHILS NFR BLD: 66 % (ref 36–65)
NEUTS SEG NFR BLD: 6.53 K/UL (ref 1.5–8.1)
NITRITE UR QL STRIP: NEGATIVE
NRBC BLD-RTO: 0 PER 100 WBC
PH UR STRIP: 7 [PH] (ref 5–9)
PLATELET # BLD AUTO: 253 K/UL (ref 138–453)
PMV BLD AUTO: 10.7 FL (ref 8.1–13.5)
POTASSIUM SERPL-SCNC: 3.7 MMOL/L (ref 3.7–5.3)
PROT SERPL-MCNC: 7.2 G/DL (ref 6.6–8.7)
PROT UR STRIP-MCNC: NEGATIVE MG/DL
RBC # BLD AUTO: 4.91 M/UL (ref 3.95–5.11)
RBC #/AREA URNS HPF: ABNORMAL /HPF (ref 0–2)
SODIUM SERPL-SCNC: 142 MMOL/L (ref 136–145)
SP GR UR STRIP: 1.02 (ref 1.01–1.02)
UROBILINOGEN UR STRIP-ACNC: NORMAL EU/DL (ref 0–1)
WBC #/AREA URNS HPF: ABNORMAL /HPF (ref 0–5)
WBC OTHER # BLD: 9.9 K/UL (ref 3.5–11.3)

## 2024-10-10 PROCEDURE — 6360000002 HC RX W HCPCS: Performed by: PHYSICIAN ASSISTANT

## 2024-10-10 PROCEDURE — 36415 COLL VENOUS BLD VENIPUNCTURE: CPT

## 2024-10-10 PROCEDURE — 96375 TX/PRO/DX INJ NEW DRUG ADDON: CPT

## 2024-10-10 PROCEDURE — 83690 ASSAY OF LIPASE: CPT

## 2024-10-10 PROCEDURE — 80053 COMPREHEN METABOLIC PANEL: CPT

## 2024-10-10 PROCEDURE — 74177 CT ABD & PELVIS W/CONTRAST: CPT

## 2024-10-10 PROCEDURE — 81001 URINALYSIS AUTO W/SCOPE: CPT

## 2024-10-10 PROCEDURE — 85025 COMPLETE CBC W/AUTO DIFF WBC: CPT

## 2024-10-10 PROCEDURE — 99285 EMERGENCY DEPT VISIT HI MDM: CPT

## 2024-10-10 PROCEDURE — 96374 THER/PROPH/DIAG INJ IV PUSH: CPT

## 2024-10-10 PROCEDURE — 81025 URINE PREGNANCY TEST: CPT

## 2024-10-10 PROCEDURE — 87086 URINE CULTURE/COLONY COUNT: CPT

## 2024-10-10 PROCEDURE — 6360000004 HC RX CONTRAST MEDICATION: Performed by: PHYSICIAN ASSISTANT

## 2024-10-10 RX ORDER — ONDANSETRON 2 MG/ML
4 INJECTION INTRAMUSCULAR; INTRAVENOUS ONCE
Status: COMPLETED | OUTPATIENT
Start: 2024-10-10 | End: 2024-10-10

## 2024-10-10 RX ORDER — KETOROLAC TROMETHAMINE 10 MG/1
10 TABLET, FILM COATED ORAL EVERY 6 HOURS PRN
Qty: 10 TABLET | Refills: 0 | Status: SHIPPED | OUTPATIENT
Start: 2024-10-10

## 2024-10-10 RX ORDER — DULOXETIN HYDROCHLORIDE 30 MG/1
30 CAPSULE, DELAYED RELEASE ORAL DAILY
COMMUNITY

## 2024-10-10 RX ORDER — PREGABALIN 100 MG/1
200 CAPSULE ORAL 2 TIMES DAILY
COMMUNITY

## 2024-10-10 RX ORDER — KETOROLAC TROMETHAMINE 30 MG/ML
30 INJECTION, SOLUTION INTRAMUSCULAR; INTRAVENOUS ONCE
Status: COMPLETED | OUTPATIENT
Start: 2024-10-10 | End: 2024-10-10

## 2024-10-10 RX ORDER — SODIUM CHLORIDE 0.9 % (FLUSH) 0.9 %
3 SYRINGE (ML) INJECTION EVERY 8 HOURS
Status: DISCONTINUED | OUTPATIENT
Start: 2024-10-10 | End: 2024-10-10 | Stop reason: HOSPADM

## 2024-10-10 RX ORDER — IOPAMIDOL 755 MG/ML
75 INJECTION, SOLUTION INTRAVASCULAR
Status: COMPLETED | OUTPATIENT
Start: 2024-10-10 | End: 2024-10-10

## 2024-10-10 RX ADMIN — IOPAMIDOL 75 ML: 755 INJECTION, SOLUTION INTRAVENOUS at 14:59

## 2024-10-10 RX ADMIN — ONDANSETRON 4 MG: 2 INJECTION, SOLUTION INTRAMUSCULAR; INTRAVENOUS at 15:52

## 2024-10-10 RX ADMIN — KETOROLAC TROMETHAMINE 30 MG: 30 INJECTION, SOLUTION INTRAMUSCULAR at 15:52

## 2024-10-10 ASSESSMENT — PAIN SCALES - GENERAL
PAINLEVEL_OUTOF10: 5
PAINLEVEL_OUTOF10: 4

## 2024-10-10 ASSESSMENT — ENCOUNTER SYMPTOMS
NAUSEA: 0
BACK PAIN: 0
ABDOMINAL PAIN: 1
VOMITING: 0
SHORTNESS OF BREATH: 0
COUGH: 0
DIARRHEA: 0

## 2024-10-10 ASSESSMENT — PAIN - FUNCTIONAL ASSESSMENT
PAIN_FUNCTIONAL_ASSESSMENT: 0-10
PAIN_FUNCTIONAL_ASSESSMENT: ACTIVITIES ARE NOT PREVENTED

## 2024-10-10 ASSESSMENT — PAIN DESCRIPTION - PAIN TYPE: TYPE: ACUTE PAIN

## 2024-10-10 ASSESSMENT — PAIN DESCRIPTION - FREQUENCY: FREQUENCY: CONTINUOUS

## 2024-10-10 ASSESSMENT — PAIN DESCRIPTION - ORIENTATION
ORIENTATION: RIGHT
ORIENTATION: RIGHT

## 2024-10-10 ASSESSMENT — PAIN DESCRIPTION - DESCRIPTORS: DESCRIPTORS: SHARP;ACHING

## 2024-10-10 ASSESSMENT — PAIN DESCRIPTION - LOCATION
LOCATION: ABDOMEN
LOCATION: ABDOMEN

## 2024-10-10 ASSESSMENT — PAIN DESCRIPTION - ONSET: ONSET: ON-GOING

## 2024-10-10 NOTE — ED NOTES
Pt reported being diagnosed with bacterial Vaginosis and placed on medication yesterday has not started it yet.

## 2024-10-10 NOTE — DISCHARGE INSTRUCTIONS
Your blood work, urine, and imaging were all normal today.  Take the anti-inflammatory medicine and continue to monitor your symptoms.  If you worsen, return to the emergency department.  Otherwise follow-up with your primary care provider.

## 2024-10-10 NOTE — ED PROVIDER NOTES
[facility]  EMERGENCY DEPARTMENT ENCOUNTER      Pt Name: Linda Lennon  MRN: [id number]  Birthdate 1990  Date of evaluation: 10/10/2024  Provider: Merline Abraham PA-C    CHIEF COMPLAINT       Chief Complaint   Patient presents with    Flank Pain     Rt sided flank pain that started about a week ago, stopped and then increased yesterday. No issues with elimination. Hx kidney stones, states doesn't feel like that         HISTORY OF PRESENT ILLNESS      Linda Lennon is a 33 y.o. female who presents to the emergency department due to right side pain.  Patient has been experiencing intermittent pain in her right side for about a week now.  It is not in the right lower quadrant and it is not up under her rib cage.  It does not radiate into her back or her flanks.  She has a history of kidney stones and this does not feel similar.  It does not seem to be worse with eating.  No nausea, vomiting, or diarrhea.  No fever or chills.        REVIEW OF SYSTEMS       Review of Systems   Constitutional:  Negative for activity change, appetite change, chills and fever.   Respiratory:  Negative for cough and shortness of breath.    Cardiovascular:  Negative for chest pain.   Gastrointestinal:  Positive for abdominal pain. Negative for diarrhea, nausea and vomiting.   Genitourinary:  Negative for dysuria, flank pain and hematuria.   Musculoskeletal:  Negative for back pain.         PAST MEDICAL HISTORY     Past Medical History:   Diagnosis Date    Anemia     GERD (gastroesophageal reflux disease)     Kidney stones     Postpartum depression          SURGICAL HISTORY       Past Surgical History:   Procedure Laterality Date    EXTERNAL EAR SURGERY Left     LITHOTRIPSY Right 08/25/2020    ESWL EXTRACORPOREAL SHOCK WAVE LITHOTRIPSY     LITHOTRIPSY Right 8/25/2020    ESWL EXTRACORPOREAL SHOCK WAVE LITHOTRIPSY performed by Rome Lloyd MD at [facility] OR    OTHER SURGICAL HISTORY Right 5/26/2016    right cysto HLL

## 2024-10-10 NOTE — ED NOTES
Pt understands discharge instructions and verbalizes understanding, medicated prior to ED discharge per order

## 2024-10-11 LAB
MICROORGANISM SPEC CULT: NORMAL
SPECIMEN DESCRIPTION: NORMAL

## 2024-12-06 ENCOUNTER — HOSPITAL ENCOUNTER (EMERGENCY)
Age: 34
Discharge: HOME | End: 2024-12-07
Attending: STUDENT IN AN ORGANIZED HEALTH CARE EDUCATION/TRAINING PROGRAM
Payer: COMMERCIAL

## 2024-12-06 VITALS
TEMPERATURE: 97.6 F | RESPIRATION RATE: 18 BRPM | DIASTOLIC BLOOD PRESSURE: 88 MMHG | HEART RATE: 110 BPM | SYSTOLIC BLOOD PRESSURE: 120 MMHG | OXYGEN SATURATION: 90 %

## 2024-12-06 DIAGNOSIS — R51.9 ACUTE NONINTRACTABLE HEADACHE, UNSPECIFIED HEADACHE TYPE: Primary | ICD-10-CM

## 2024-12-06 PROCEDURE — 96375 TX/PRO/DX INJ NEW DRUG ADDON: CPT

## 2024-12-06 PROCEDURE — 2580000003 HC RX 258: Performed by: STUDENT IN AN ORGANIZED HEALTH CARE EDUCATION/TRAINING PROGRAM

## 2024-12-06 PROCEDURE — 6370000000 HC RX 637 (ALT 250 FOR IP): Performed by: STUDENT IN AN ORGANIZED HEALTH CARE EDUCATION/TRAINING PROGRAM

## 2024-12-06 PROCEDURE — 96374 THER/PROPH/DIAG INJ IV PUSH: CPT

## 2024-12-06 PROCEDURE — 6360000002 HC RX W HCPCS: Performed by: STUDENT IN AN ORGANIZED HEALTH CARE EDUCATION/TRAINING PROGRAM

## 2024-12-06 PROCEDURE — 99284 EMERGENCY DEPT VISIT MOD MDM: CPT

## 2024-12-06 RX ORDER — ACETAMINOPHEN 500 MG
1000 TABLET ORAL ONCE
Status: COMPLETED | OUTPATIENT
Start: 2024-12-06 | End: 2024-12-06

## 2024-12-06 RX ORDER — 0.9 % SODIUM CHLORIDE 0.9 %
1000 INTRAVENOUS SOLUTION INTRAVENOUS ONCE
Status: COMPLETED | OUTPATIENT
Start: 2024-12-06 | End: 2024-12-07

## 2024-12-06 RX ORDER — DIPHENHYDRAMINE HYDROCHLORIDE 50 MG/ML
50 INJECTION INTRAMUSCULAR; INTRAVENOUS ONCE
Status: COMPLETED | OUTPATIENT
Start: 2024-12-06 | End: 2024-12-06

## 2024-12-06 RX ORDER — PROCHLORPERAZINE EDISYLATE 5 MG/ML
10 INJECTION INTRAMUSCULAR; INTRAVENOUS ONCE
Status: COMPLETED | OUTPATIENT
Start: 2024-12-06 | End: 2024-12-06

## 2024-12-06 RX ORDER — KETOROLAC TROMETHAMINE 15 MG/ML
15 INJECTION, SOLUTION INTRAMUSCULAR; INTRAVENOUS ONCE
Status: COMPLETED | OUTPATIENT
Start: 2024-12-06 | End: 2024-12-06

## 2024-12-06 RX ADMIN — ACETAMINOPHEN 1000 MG: 500 TABLET ORAL at 23:01

## 2024-12-06 RX ADMIN — DIPHENHYDRAMINE HYDROCHLORIDE 50 MG: 50 INJECTION INTRAMUSCULAR; INTRAVENOUS at 23:01

## 2024-12-06 RX ADMIN — SODIUM CHLORIDE 1000 ML: 9 INJECTION, SOLUTION INTRAVENOUS at 23:01

## 2024-12-06 RX ADMIN — PROCHLORPERAZINE EDISYLATE 10 MG: 5 INJECTION INTRAMUSCULAR; INTRAVENOUS at 23:05

## 2024-12-06 RX ADMIN — KETOROLAC TROMETHAMINE 15 MG: 15 INJECTION, SOLUTION INTRAMUSCULAR; INTRAVENOUS at 23:04

## 2024-12-06 ASSESSMENT — PAIN SCALES - GENERAL: PAINLEVEL_OUTOF10: 9

## 2024-12-07 NOTE — ED PROVIDER NOTES
[facility] EMERGENCY DEPARTMENT     Pt Name: Linda Lennon  MRN: [id number]  Birthdate 1990  Date of evaluation: 12/6/2024  Physician: Rod Fox MD      Note to patient: The 21st Century Cures Act requires that medical notes like this one to be available to patients in the interest of transparency. Please be advised, this is a medical document. It is intended as [id number] communication. It is written in medical language and may contain abbreviations and verbiage that may be unfamiliar. It may appear to read blunt or direct. Medical documents are intended to carry relevant medical information, facts as evident and the clinical opinion of the practitioner.     CHIEF COMPLAINT       Chief Complaint   Patient presents with    Headache     Pt has hx of migraines and has been taking tylenol/motrin as well as toradol and tramadol. Pt states that the medications did not help.   Pt states that she has had photosensitivity this evening.     HISTORY OF PRESENT ILLNESS   Linda Lennon is a 34 y.o. female with PMHx of chronic headache  who presents to the emergency department for evaluation of headache.  Patient reports she has had a persistent headache for the last 2 days.  States that this headache is consistent with her normal headaches.  States that they are associated with nausea, photophobia and phonophobia that are bitemporal.  She saw her PCP for this headache yesterday and she was started on some oral Toradol which she states is only had some mild improvement.  She denies any weakness, numbness, vision changes.  Headache was not sudden or maximal onset.  Denies any fevers or neck stiffness.      The patient has no other acute complaints at this time.    A 10-point ROS was performed and negative unless otherwise stated in HPI  PASTMEDICAL HISTORY     Past Medical History:   Diagnosis Date    Anemia     GERD (gastroesophageal reflux disease)     Kidney stones     Postpartum depression        Patient

## 2024-12-07 NOTE — DISCHARGE INSTRUCTIONS
Follow up with the listed physician or medical clinic within 24-72 hours.  Return to the Emergency Department if you develop any new or concerning symptoms or if your are getting worse

## 2024-12-13 ENCOUNTER — TELEPHONE (OUTPATIENT)
Dept: UROLOGY | Age: 34
End: 2024-12-13

## 2024-12-13 NOTE — TELEPHONE ENCOUNTER
Patient passed a stone 2 days ago and saved it. She has tight kidney pain and went to ER at [facility] last night.  They said she has multiple stones in kidneys.  When do you want to see her?

## 2024-12-13 NOTE — TELEPHONE ENCOUNTER
Left message for patient top call the office.  I scheduled her for 1/6/25 at 1:15pm so I need to verify that is ok with patient.

## 2025-01-28 ENCOUNTER — APPOINTMENT (OUTPATIENT)
Dept: CT IMAGING | Age: 35
End: 2025-01-28
Payer: COMMERCIAL

## 2025-01-28 ENCOUNTER — APPOINTMENT (OUTPATIENT)
Dept: GENERAL RADIOLOGY | Age: 35
End: 2025-01-28
Payer: COMMERCIAL

## 2025-01-28 ENCOUNTER — HOSPITAL ENCOUNTER (EMERGENCY)
Age: 35
Discharge: HOME | End: 2025-01-28
Payer: COMMERCIAL

## 2025-01-28 VITALS
TEMPERATURE: 98.2 F | RESPIRATION RATE: 14 BRPM | SYSTOLIC BLOOD PRESSURE: 132 MMHG | OXYGEN SATURATION: 95 % | DIASTOLIC BLOOD PRESSURE: 96 MMHG | HEART RATE: 79 BPM

## 2025-01-28 DIAGNOSIS — R07.9 CHEST PAIN, UNSPECIFIED TYPE: Primary | ICD-10-CM

## 2025-01-28 DIAGNOSIS — I10 HYPERTENSION, UNSPECIFIED TYPE: ICD-10-CM

## 2025-01-28 LAB
ANION GAP SERPL CALCULATED.3IONS-SCNC: 11 MMOL/L (ref 9–16)
BASOPHILS # BLD: 0.06 K/UL (ref 0–0.2)
BASOPHILS NFR BLD: 1 % (ref 0–2)
BUN SERPL-MCNC: 13 MG/DL (ref 6–20)
BUN/CREAT SERPL: 22 (ref 9–20)
CALCIUM SERPL-MCNC: 9.5 MG/DL (ref 8.6–10.4)
CHLORIDE SERPL-SCNC: 104 MMOL/L (ref 98–107)
CO2 SERPL-SCNC: 26 MMOL/L (ref 20–31)
CREAT SERPL-MCNC: 0.6 MG/DL (ref 0.5–0.9)
D DIMER PPP FEU-MCNC: <0.27 UG/ML FEU (ref 0–0.59)
EKG ATRIAL RATE: 82 BPM
EKG P AXIS: 38 DEGREES
EKG P-R INTERVAL: 132 MS
EKG Q-T INTERVAL: 370 MS
EKG QRS DURATION: 74 MS
EKG QTC CALCULATION (BAZETT): 432 MS
EKG R AXIS: 22 DEGREES
EKG T AXIS: 17 DEGREES
EKG VENTRICULAR RATE: 82 BPM
EOSINOPHIL # BLD: 0.04 K/UL (ref 0–0.44)
EOSINOPHILS RELATIVE PERCENT: 0 % (ref 1–4)
ERYTHROCYTE [DISTWIDTH] IN BLOOD BY AUTOMATED COUNT: 12 % (ref 11.8–14.4)
GFR, ESTIMATED: >90 ML/MIN/1.73M2
GLUCOSE SERPL-MCNC: 92 MG/DL (ref 74–99)
HCG SERPL QL: NEGATIVE
HCT VFR BLD AUTO: 44.9 % (ref 36.3–47.1)
HGB BLD-MCNC: 15.6 G/DL (ref 11.9–15.1)
IMM GRANULOCYTES # BLD AUTO: 0.03 K/UL (ref 0–0.3)
IMM GRANULOCYTES NFR BLD: 0 %
LYMPHOCYTES NFR BLD: 3.14 K/UL (ref 1.1–3.7)
LYMPHOCYTES RELATIVE PERCENT: 26 % (ref 24–43)
MCH RBC QN AUTO: 30.6 PG (ref 25.2–33.5)
MCHC RBC AUTO-ENTMCNC: 34.7 G/DL (ref 28.4–34.8)
MCV RBC AUTO: 88 FL (ref 82.6–102.9)
MONOCYTES NFR BLD: 0.66 K/UL (ref 0.1–1.2)
MONOCYTES NFR BLD: 6 % (ref 3–12)
NEUTROPHILS NFR BLD: 67 % (ref 36–65)
NEUTS SEG NFR BLD: 8.17 K/UL (ref 1.5–8.1)
NRBC BLD-RTO: 0 PER 100 WBC
PLATELET # BLD AUTO: 292 K/UL (ref 138–453)
PMV BLD AUTO: 10.8 FL (ref 8.1–13.5)
POTASSIUM SERPL-SCNC: 3.4 MMOL/L (ref 3.7–5.3)
RBC # BLD AUTO: 5.1 M/UL (ref 3.95–5.11)
SODIUM SERPL-SCNC: 141 MMOL/L (ref 136–145)
TROPONIN I SERPL HS-MCNC: <6 NG/L (ref 0–14)
TROPONIN I SERPL HS-MCNC: <6 NG/L (ref 0–14)
WBC OTHER # BLD: 12.1 K/UL (ref 3.5–11.3)

## 2025-01-28 PROCEDURE — 6370000000 HC RX 637 (ALT 250 FOR IP): Performed by: PHYSICIAN ASSISTANT

## 2025-01-28 PROCEDURE — 99285 EMERGENCY DEPT VISIT HI MDM: CPT

## 2025-01-28 PROCEDURE — 71045 X-RAY EXAM CHEST 1 VIEW: CPT

## 2025-01-28 PROCEDURE — 93005 ELECTROCARDIOGRAM TRACING: CPT | Performed by: EMERGENCY MEDICINE

## 2025-01-28 PROCEDURE — 84703 CHORIONIC GONADOTROPIN ASSAY: CPT

## 2025-01-28 PROCEDURE — 93010 ELECTROCARDIOGRAM REPORT: CPT | Performed by: FAMILY MEDICINE

## 2025-01-28 PROCEDURE — 80048 BASIC METABOLIC PNL TOTAL CA: CPT

## 2025-01-28 PROCEDURE — 84484 ASSAY OF TROPONIN QUANT: CPT

## 2025-01-28 PROCEDURE — 6360000004 HC RX CONTRAST MEDICATION: Performed by: PHYSICIAN ASSISTANT

## 2025-01-28 PROCEDURE — 85025 COMPLETE CBC W/AUTO DIFF WBC: CPT

## 2025-01-28 PROCEDURE — 85379 FIBRIN DEGRADATION QUANT: CPT

## 2025-01-28 PROCEDURE — 71275 CT ANGIOGRAPHY CHEST: CPT

## 2025-01-28 RX ORDER — IOPAMIDOL 755 MG/ML
75 INJECTION, SOLUTION INTRAVASCULAR
Status: COMPLETED | OUTPATIENT
Start: 2025-01-28 | End: 2025-01-28

## 2025-01-28 RX ORDER — AMLODIPINE BESYLATE 5 MG/1
10 TABLET ORAL ONCE
Status: COMPLETED | OUTPATIENT
Start: 2025-01-28 | End: 2025-01-28

## 2025-01-28 RX ADMIN — AMLODIPINE BESYLATE 10 MG: 5 TABLET ORAL at 11:30

## 2025-01-28 RX ADMIN — IOPAMIDOL 75 ML: 755 INJECTION, SOLUTION INTRAVENOUS at 12:37

## 2025-01-28 ASSESSMENT — PAIN - FUNCTIONAL ASSESSMENT: PAIN_FUNCTIONAL_ASSESSMENT: 0-10

## 2025-01-28 ASSESSMENT — PAIN DESCRIPTION - LOCATION: LOCATION: CHEST

## 2025-01-28 ASSESSMENT — PAIN SCALES - GENERAL: PAINLEVEL_OUTOF10: 7

## 2025-01-28 NOTE — ED PROVIDER NOTES
Emergency Department Encounter     Chief Complaint  Chief Complaint   Patient presents with    Chest Pain     Patient left shoulder pain that moves into the chest. Patient does complains of some dizziness with the chest pain that radiates under the left breast.        HPI  This a 34-year-old female states that for the past 2 days she has had pain like a tingling or aching sensation in her left upper arm with tingling that goes down into her left hand this has been intermittent coming and going completely resolving and then returning.  She states it feels like a \"weakness\" but there is no decreased strength just that the muscle feels weak or aching.  Patient has a history of pulmonary embolism has not been on anticoagulants for the past 2 years.  She states today she developed some pain underneath her right breast radiating over to her left breast but not pleuritic in nature and no shortness of breath.  Patient recently diagnosed with hypertension and takes amlodipine at home every night taking the last dose last night.           Past Medical History  Past Medical History:   Diagnosis Date    Anemia     GERD (gastroesophageal reflux disease)     Kidney stones     Postpartum depression         Surgical History  Past Surgical History:   Procedure Laterality Date    EXTERNAL EAR SURGERY Left     LITHOTRIPSY Right 08/25/2020    ESWL EXTRACORPOREAL SHOCK WAVE LITHOTRIPSY     LITHOTRIPSY Right 8/25/2020    ESWL EXTRACORPOREAL SHOCK WAVE LITHOTRIPSY performed by Rome Lloyd MD at [facility] OR    OTHER SURGICAL HISTORY Right 5/26/2016    right cysto HLL with stent exchange    PAIN MANAGEMENT PROCEDURE N/A 8/17/2023    EPIDURAL STEROID INJECTION-LUMBAR L5-S1 INTULAMENAL EPIDURAL STEROID INJECTION performed by Armin Vasquez DO at [facility] OR    PAIN MANAGEMENT PROCEDURE Bilateral 10/11/2023    LUMBAR MEDIAL BRANCH BLOCK-L3, L4, L5 performed by Armin Vasquez DO at [facility] OR    PAIN MANAGEMENT PROCEDURE Bilateral 11/8/2023

## 2025-01-28 NOTE — DISCHARGE INSTRUCTIONS
You will receive a survey in the next couple days regarding your experience in the ED. We are constantly striving to improve our care and welcome your feedback. Thank you very much for your time.     The emergency department evaluation is not a complete evaluation, you're always required to followup with another doctor within the next few days to assess how your symptoms are progressing and to ensure that there is no indication for further testing or returning to the hospital.  Even with treatment sometimes your condition worsens and you will need to return to the hospital.  If you are having pain and it is getting worse you should return to the hospital.  If you have any new symptoms that were not addressed at your original visit you should return to the hospital. If you're having difficulty breathing but it is getting worse you should return to the hospital.  If you're vomiting and cannot take the medicines that were prescribed you should return to the hospital.  If you're having persistent fevers you should return to the hospital.  If you have any question of whether or not your symptoms are serious enough or for any other urgent concerns- always return to the hospital for repeat evaluation.

## 2025-02-05 ENCOUNTER — TELEPHONE (OUTPATIENT)
Dept: UROLOGY | Age: 35
End: 2025-02-05

## 2025-02-05 NOTE — TELEPHONE ENCOUNTER
We have not seen her in over 1 year. We cannot order anything.     If he pain is severe she will need to go to the ER    We can schedule her in the office for next available f/u when available    Helical Rim Text: The closure involved the helical rim.

## 2025-02-22 ENCOUNTER — HOSPITAL ENCOUNTER (EMERGENCY)
Age: 35
Discharge: HOME | End: 2025-02-23
Attending: EMERGENCY MEDICINE
Payer: COMMERCIAL

## 2025-02-22 DIAGNOSIS — N20.0 KIDNEY STONE: Primary | ICD-10-CM

## 2025-02-22 LAB
ALBUMIN SERPL-MCNC: 4 G/DL (ref 3.5–5.2)
ALBUMIN/GLOB SERPL: 1.4 {RATIO} (ref 1–2.5)
ALP SERPL-CCNC: 91 U/L (ref 35–104)
ALT SERPL-CCNC: 18 U/L (ref 10–35)
ANION GAP SERPL CALCULATED.3IONS-SCNC: 11 MMOL/L (ref 9–16)
AST SERPL-CCNC: 24 U/L (ref 10–35)
BACTERIA URNS QL MICRO: ABNORMAL
BASOPHILS # BLD: 0.05 K/UL (ref 0–0.2)
BASOPHILS NFR BLD: 1 % (ref 0–2)
BILIRUB SERPL-MCNC: <0.2 MG/DL (ref 0–1.2)
BILIRUB UR QL STRIP: NEGATIVE
BUN SERPL-MCNC: 17 MG/DL (ref 6–20)
BUN/CREAT SERPL: 21 (ref 9–20)
CALCIUM SERPL-MCNC: 9.4 MG/DL (ref 8.6–10.4)
CHLORIDE SERPL-SCNC: 104 MMOL/L (ref 98–107)
CLARITY UR: ABNORMAL
CO2 SERPL-SCNC: 25 MMOL/L (ref 20–31)
COLOR UR: YELLOW
CREAT SERPL-MCNC: 0.8 MG/DL (ref 0.5–0.9)
CRYSTALS URNS MICRO: ABNORMAL /HPF
EOSINOPHIL # BLD: 0.08 K/UL (ref 0–0.44)
EOSINOPHILS RELATIVE PERCENT: 1 % (ref 1–4)
EPI CELLS #/AREA URNS HPF: ABNORMAL /HPF (ref 0–25)
ERYTHROCYTE [DISTWIDTH] IN BLOOD BY AUTOMATED COUNT: 12.4 % (ref 11.8–14.4)
GFR, ESTIMATED: >90 ML/MIN/1.73M2
GLUCOSE SERPL-MCNC: 94 MG/DL (ref 74–99)
GLUCOSE UR STRIP-MCNC: NEGATIVE MG/DL
HCT VFR BLD AUTO: 43.1 % (ref 36.3–47.1)
HGB BLD-MCNC: 14.7 G/DL (ref 11.9–15.1)
HGB UR QL STRIP.AUTO: NEGATIVE
IMM GRANULOCYTES # BLD AUTO: <0.03 K/UL (ref 0–0.3)
IMM GRANULOCYTES NFR BLD: 0 %
KETONES UR STRIP-MCNC: NEGATIVE MG/DL
LEUKOCYTE ESTERASE UR QL STRIP: NEGATIVE
LYMPHOCYTES NFR BLD: 2.91 K/UL (ref 1.1–3.7)
LYMPHOCYTES RELATIVE PERCENT: 28 % (ref 24–43)
MCH RBC QN AUTO: 30.6 PG (ref 25.2–33.5)
MCHC RBC AUTO-ENTMCNC: 34.1 G/DL (ref 28.4–34.8)
MCV RBC AUTO: 89.8 FL (ref 82.6–102.9)
MONOCYTES NFR BLD: 0.62 K/UL (ref 0.1–1.2)
MONOCYTES NFR BLD: 6 % (ref 3–12)
MUCOUS THREADS URNS QL MICRO: ABNORMAL
NEUTROPHILS NFR BLD: 64 % (ref 36–65)
NEUTS SEG NFR BLD: 6.91 K/UL (ref 1.5–8.1)
NITRITE UR QL STRIP: NEGATIVE
NRBC BLD-RTO: 0 PER 100 WBC
PH UR STRIP: 6 [PH] (ref 5–9)
PLATELET # BLD AUTO: 286 K/UL (ref 138–453)
PMV BLD AUTO: 10.8 FL (ref 8.1–13.5)
POTASSIUM SERPL-SCNC: 3.6 MMOL/L (ref 3.7–5.3)
PROT SERPL-MCNC: 6.8 G/DL (ref 6.6–8.7)
PROT UR STRIP-MCNC: NEGATIVE MG/DL
RBC # BLD AUTO: 4.8 M/UL (ref 3.95–5.11)
RBC #/AREA URNS HPF: ABNORMAL /HPF (ref 0–2)
SODIUM SERPL-SCNC: 140 MMOL/L (ref 136–145)
SP GR UR STRIP: 1.02 (ref 1.01–1.02)
UROBILINOGEN UR STRIP-ACNC: ABNORMAL EU/DL (ref 0–1)
WBC #/AREA URNS HPF: ABNORMAL /HPF (ref 0–5)
WBC OTHER # BLD: 10.6 K/UL (ref 3.5–11.3)

## 2025-02-22 PROCEDURE — 96372 THER/PROPH/DIAG INJ SC/IM: CPT

## 2025-02-22 PROCEDURE — 6360000002 HC RX W HCPCS: Performed by: EMERGENCY MEDICINE

## 2025-02-22 PROCEDURE — 81001 URINALYSIS AUTO W/SCOPE: CPT

## 2025-02-22 PROCEDURE — 96375 TX/PRO/DX INJ NEW DRUG ADDON: CPT

## 2025-02-22 PROCEDURE — 96374 THER/PROPH/DIAG INJ IV PUSH: CPT

## 2025-02-22 PROCEDURE — 80053 COMPREHEN METABOLIC PANEL: CPT

## 2025-02-22 PROCEDURE — 99284 EMERGENCY DEPT VISIT MOD MDM: CPT

## 2025-02-22 PROCEDURE — 85025 COMPLETE CBC W/AUTO DIFF WBC: CPT

## 2025-02-22 RX ORDER — PROMETHAZINE HYDROCHLORIDE 25 MG/ML
25 INJECTION, SOLUTION INTRAMUSCULAR; INTRAVENOUS ONCE
Status: COMPLETED | OUTPATIENT
Start: 2025-02-22 | End: 2025-02-22

## 2025-02-22 RX ORDER — KETOROLAC TROMETHAMINE 30 MG/ML
30 INJECTION, SOLUTION INTRAMUSCULAR; INTRAVENOUS ONCE
Status: COMPLETED | OUTPATIENT
Start: 2025-02-22 | End: 2025-02-22

## 2025-02-22 RX ORDER — FENTANYL CITRATE 50 UG/ML
50 INJECTION, SOLUTION INTRAMUSCULAR; INTRAVENOUS ONCE
Status: COMPLETED | OUTPATIENT
Start: 2025-02-22 | End: 2025-02-22

## 2025-02-22 RX ORDER — ONDANSETRON 2 MG/ML
4 INJECTION INTRAMUSCULAR; INTRAVENOUS ONCE
Status: COMPLETED | OUTPATIENT
Start: 2025-02-22 | End: 2025-02-22

## 2025-02-22 RX ADMIN — FENTANYL CITRATE 50 MCG: 50 INJECTION INTRAMUSCULAR; INTRAVENOUS at 22:11

## 2025-02-22 RX ADMIN — KETOROLAC TROMETHAMINE 30 MG: 30 INJECTION, SOLUTION INTRAMUSCULAR at 23:46

## 2025-02-22 RX ADMIN — PROMETHAZINE HYDROCHLORIDE 25 MG: 25 INJECTION INTRAMUSCULAR; INTRAVENOUS at 23:38

## 2025-02-22 RX ADMIN — ONDANSETRON 4 MG: 2 INJECTION, SOLUTION INTRAMUSCULAR; INTRAVENOUS at 22:11

## 2025-02-22 ASSESSMENT — PAIN SCALES - GENERAL
PAINLEVEL_OUTOF10: 8
PAINLEVEL_OUTOF10: 9
PAINLEVEL_OUTOF10: 7

## 2025-02-22 ASSESSMENT — PAIN - FUNCTIONAL ASSESSMENT: PAIN_FUNCTIONAL_ASSESSMENT: 0-10

## 2025-02-22 ASSESSMENT — PAIN DESCRIPTION - LOCATION
LOCATION: FLANK
LOCATION: FLANK

## 2025-02-22 ASSESSMENT — PAIN DESCRIPTION - ORIENTATION
ORIENTATION: RIGHT
ORIENTATION: RIGHT

## 2025-02-23 VITALS
TEMPERATURE: 97.5 F | RESPIRATION RATE: 16 BRPM | OXYGEN SATURATION: 98 % | HEART RATE: 85 BPM | DIASTOLIC BLOOD PRESSURE: 108 MMHG | SYSTOLIC BLOOD PRESSURE: 142 MMHG

## 2025-02-23 PROCEDURE — 6370000000 HC RX 637 (ALT 250 FOR IP): Performed by: EMERGENCY MEDICINE

## 2025-02-23 RX ORDER — OXYCODONE AND ACETAMINOPHEN 5; 325 MG/1; MG/1
2 TABLET ORAL ONCE
Status: COMPLETED | OUTPATIENT
Start: 2025-02-23 | End: 2025-02-23

## 2025-02-23 RX ORDER — OXYCODONE AND ACETAMINOPHEN 5; 325 MG/1; MG/1
1 TABLET ORAL EVERY 6 HOURS PRN
Qty: 6 TABLET | Refills: 0 | Status: SHIPPED | OUTPATIENT
Start: 2025-02-23 | End: 2025-02-26

## 2025-02-23 RX ADMIN — OXYCODONE HYDROCHLORIDE AND ACETAMINOPHEN 2 TABLET: 5; 325 TABLET ORAL at 01:01

## 2025-02-23 ASSESSMENT — PAIN SCALES - GENERAL
PAINLEVEL_OUTOF10: 2
PAINLEVEL_OUTOF10: 5

## 2025-02-23 ASSESSMENT — PAIN - FUNCTIONAL ASSESSMENT: PAIN_FUNCTIONAL_ASSESSMENT: 0-10

## 2025-02-23 NOTE — DISCHARGE INSTRUCTIONS
Please take pain medication as prescribed, follow-up with your urologist in 2 days, return to ER for worsening pain.  Fever, difficulty urinating.

## 2025-02-23 NOTE — ED PROVIDER NOTES
passing stone she may have an additional 1. Will check UA, to assess for possible infection, her abdomen is overall soft and non tender    Amount and/or Complexity of Data Reviewed  External Data Reviewed: labs, radiology and notes.  Labs: ordered.    Risk  Prescription drug management.          EMERGENCY DEPARTMENT COURSE:      ED Course as of 02/23/25 0519   Sat Feb 22, 2025   2340 Patient still nauseated and having pain we will give additional NSAID at this time. [CE]   Sun Feb 23, 2025   0057 Pain down to 5/10, will do oral percocet at this time [CE]   0138 Patient feeling better at this time we will plan on discharge, and outpatient follow-up with urology.  She does have tramadol as well as Zofran at home.  Will do short course of Percocet for additional pain coverage. [CE]      ED Course User Index  [CE] Renee Camilo DO         FINAL IMPRESSION      1. Kidney stone          DISPOSITION / PLAN     DISPOSITION Decision To Discharge 02/23/2025 01:39:10 AM   DISPOSITION CONDITION Stable           PATIENT REFERRED TO:  Rome Lloyd MD  [address], [place] [address]  [phone]            DISCHARGE MEDICATIONS:  Discharge Medication List as of 2/23/2025  1:56 AM        START taking these medications    Details   oxyCODONE-acetaminophen (PERCOCET) 5-325 MG per tablet Take 1 tablet by mouth every 6 hours as needed for Pain for up to 3 days. Intended supply: 3 days. Take lowest dose possible to manage pain Max Daily Amount: 4 tablets, Disp-6 tablet, R-0Normal             Renee Camilo DO  Emergency Medicine     (Please note that portions of thisnote were completed with a voice recognition program.  Efforts were made to edit the dictations but occasionally words are mis-transcribed.)        Renee Camilo DO  02/23/25 0519

## 2025-03-11 ENCOUNTER — HOSPITAL ENCOUNTER (OUTPATIENT)
Dept: GENERAL RADIOLOGY | Age: 35
Discharge: HOME | End: 2025-03-13
Payer: COMMERCIAL

## 2025-03-11 ENCOUNTER — OFFICE VISIT (OUTPATIENT)
Dept: UROLOGY | Age: 35
End: 2025-03-11
Payer: COMMERCIAL

## 2025-03-11 ENCOUNTER — HOSPITAL ENCOUNTER (OUTPATIENT)
Age: 35
Setting detail: SPECIMEN
Discharge: HOME | End: 2025-03-11
Payer: COMMERCIAL

## 2025-03-11 ENCOUNTER — HOSPITAL ENCOUNTER (OUTPATIENT)
Age: 35
Discharge: HOME | End: 2025-03-13
Payer: COMMERCIAL

## 2025-03-11 ENCOUNTER — HOSPITAL ENCOUNTER (OUTPATIENT)
Dept: ULTRASOUND IMAGING | Age: 35
Discharge: HOME | End: 2025-03-13
Payer: COMMERCIAL

## 2025-03-11 VITALS
DIASTOLIC BLOOD PRESSURE: 70 MMHG | SYSTOLIC BLOOD PRESSURE: 134 MMHG | TEMPERATURE: 96.8 F | HEIGHT: 63 IN | BODY MASS INDEX: 29.41 KG/M2 | WEIGHT: 166 LBS

## 2025-03-11 DIAGNOSIS — N23 RENAL COLIC: ICD-10-CM

## 2025-03-11 DIAGNOSIS — M53.3 DISORDER OF SACRUM: ICD-10-CM

## 2025-03-11 DIAGNOSIS — N23 RENAL COLIC: Primary | ICD-10-CM

## 2025-03-11 DIAGNOSIS — N13.2 URETERAL STONE WITH HYDRONEPHROSIS: ICD-10-CM

## 2025-03-11 LAB
BACTERIA URNS QL MICRO: ABNORMAL
BILIRUB UR QL STRIP: NEGATIVE
CLARITY UR: ABNORMAL
COLOR UR: YELLOW
EPI CELLS #/AREA URNS HPF: ABNORMAL /HPF (ref 0–25)
GLUCOSE UR STRIP-MCNC: NEGATIVE MG/DL
HGB UR QL STRIP.AUTO: NEGATIVE
KETONES UR STRIP-MCNC: NEGATIVE MG/DL
LEUKOCYTE ESTERASE UR QL STRIP: NEGATIVE
MUCOUS THREADS URNS QL MICRO: ABNORMAL
NITRITE UR QL STRIP: NEGATIVE
PH UR STRIP: 6.5 [PH] (ref 5–9)
PROT UR STRIP-MCNC: NEGATIVE MG/DL
RBC #/AREA URNS HPF: ABNORMAL /HPF (ref 0–2)
SP GR UR STRIP: 1.02 (ref 1.01–1.02)
UROBILINOGEN UR STRIP-ACNC: NORMAL EU/DL (ref 0–1)
WBC #/AREA URNS HPF: ABNORMAL /HPF (ref 0–5)

## 2025-03-11 PROCEDURE — 99214 OFFICE O/P EST MOD 30 MIN: CPT | Performed by: NURSE PRACTITIONER

## 2025-03-11 PROCEDURE — 81001 URINALYSIS AUTO W/SCOPE: CPT

## 2025-03-11 PROCEDURE — 74018 RADEX ABDOMEN 1 VIEW: CPT

## 2025-03-11 PROCEDURE — 73522 X-RAY EXAM HIPS BI 3-4 VIEWS: CPT

## 2025-03-11 PROCEDURE — G8419 CALC BMI OUT NRM PARAM NOF/U: HCPCS | Performed by: NURSE PRACTITIONER

## 2025-03-11 PROCEDURE — 87086 URINE CULTURE/COLONY COUNT: CPT

## 2025-03-11 PROCEDURE — G8427 DOCREV CUR MEDS BY ELIG CLIN: HCPCS | Performed by: NURSE PRACTITIONER

## 2025-03-11 PROCEDURE — 1036F TOBACCO NON-USER: CPT | Performed by: NURSE PRACTITIONER

## 2025-03-11 PROCEDURE — 82365 CALCULUS SPECTROSCOPY: CPT

## 2025-03-11 PROCEDURE — 76770 US EXAM ABDO BACK WALL COMP: CPT

## 2025-03-11 RX ORDER — AMLODIPINE BESYLATE 5 MG/1
5 TABLET ORAL DAILY
COMMUNITY
Start: 2025-01-29

## 2025-03-11 RX ORDER — PREGABALIN 200 MG/1
200 CAPSULE ORAL 2 TIMES DAILY
COMMUNITY
Start: 2025-03-01

## 2025-03-11 RX ORDER — DOCUSATE SODIUM 100 MG/1
CAPSULE, LIQUID FILLED ORAL PRN
COMMUNITY
Start: 2025-03-07

## 2025-03-11 RX ORDER — TAMSULOSIN HYDROCHLORIDE 0.4 MG/1
0.4 CAPSULE ORAL DAILY
Qty: 30 CAPSULE | Refills: 0 | Status: SHIPPED | OUTPATIENT
Start: 2025-03-11

## 2025-03-11 RX ORDER — ONDANSETRON 4 MG/1
TABLET, ORALLY DISINTEGRATING ORAL PRN
COMMUNITY
Start: 2025-03-05

## 2025-03-11 NOTE — PROGRESS NOTES
History  8/2020 right ESWL    7/2021 CT abdomen pelvis with IV contrast showed multiple bilateral renal calculi.  Largest stone on the right was approximately 3 mm.  There were two 4 mm stones on the left.  There are multiple punctate stones bilaterally.     Today  Here today with complaints of right flank pain that radiates to her RLQ.  Since CT done on 3/5/2025 showed a distal right ureteral stone measuring approximately 3 mm with moderate right hydronephrosis.  The radiologist also notes bilateral nephrolithiasis and distribution compatible with medullary calcinosis, similar to prior study.  During ER evaluation she did have normal renal labs.  WBC was 12.3.  She did pass a 3 mm stone since being in the ER.  She did bring this with her today to her visit.  She continues to complain of right flank pain that radiates to her right lower quadrant.  She denies any dysuria or gross hematuria.    Plan  UA C&S    We will send stones for analysis    KUB and ultrasound STAT    Start flomax    Discussed risks and benefits of laser lithotripsy and stent placement (including anesthesia, bleeding, infection, injury to  tract, need for multiple procedures, and the risk of major complications such as ureteral perforation). We discussed the details of the procedure. We did discuss what to expect post-operatively to include: hematuria for up to two weeks, stent pain, and pain after stent removal. Patient amenable to schedule RIGHT laser lithotripsy.

## 2025-03-12 ENCOUNTER — RESULTS FOLLOW-UP (OUTPATIENT)
Dept: ULTRASOUND IMAGING | Age: 35
End: 2025-03-12

## 2025-03-12 LAB
MICROORGANISM SPEC CULT: NORMAL
SERVICE CMNT-IMP: NORMAL
SPECIMEN DESCRIPTION: NORMAL

## 2025-03-12 NOTE — TELEPHONE ENCOUNTER
----- Message from FÁTIMA Gomez - CNP sent at 3/12/2025 10:03 AM EDT -----  US and KUB do not show any hydronephrosis or ureteral stone.  We will plan on surgery on Tuesday for renal stones in the kidney

## 2025-03-13 ENCOUNTER — RESULTS FOLLOW-UP (OUTPATIENT)
Dept: UROLOGY | Age: 35
End: 2025-03-13

## 2025-03-13 NOTE — PROGRESS NOTES
Patient instructed on the pre-operative, intra-operative, and post-operative process. Patient instructed on NPO status. Medication instructions and pre operative instruction sheet reviewed with the patient. Patient will hold ibuprofen till after her procedure.the morning of her procedure she will take Duloxetine, Lyrica, and docusate with a small sip of water, prior to arrival to the hospital.

## 2025-03-13 NOTE — TELEPHONE ENCOUNTER
----- Message from FÁTIMA Gomez - CNP sent at 3/13/2025  8:21 AM EDT -----  Call pt - urine cx reviewed and negative for UTI & for significant microhematuria

## 2025-03-13 NOTE — TELEPHONE ENCOUNTER
Left a detailed message with the results on the patient's voice mail.  Advised the patient to call the office if she has any questions.

## 2025-03-13 NOTE — TELEPHONE ENCOUNTER
Informed patient of the results.  She wants to know about the UA.  It showed bacteria and mucus and she wants to now what that means.

## 2025-03-14 LAB
STONE COMPOSITION: NORMAL
STONE DESCRIPTION: NORMAL
STONE MASS: 51 MG

## 2025-03-17 ENCOUNTER — ANESTHESIA EVENT (OUTPATIENT)
Dept: OPERATING ROOM | Age: 35
End: 2025-03-17
Payer: COMMERCIAL

## 2025-03-18 ENCOUNTER — ANESTHESIA (OUTPATIENT)
Dept: OPERATING ROOM | Age: 35
End: 2025-03-18
Payer: COMMERCIAL

## 2025-03-18 ENCOUNTER — HOSPITAL ENCOUNTER (OUTPATIENT)
Age: 35
Setting detail: OUTPATIENT SURGERY
Discharge: HOME | End: 2025-03-18
Attending: UROLOGY | Admitting: UROLOGY
Payer: COMMERCIAL

## 2025-03-18 ENCOUNTER — APPOINTMENT (OUTPATIENT)
Dept: GENERAL RADIOLOGY | Age: 35
End: 2025-03-18
Attending: UROLOGY
Payer: COMMERCIAL

## 2025-03-18 VITALS
OXYGEN SATURATION: 95 % | RESPIRATION RATE: 16 BRPM | WEIGHT: 163 LBS | SYSTOLIC BLOOD PRESSURE: 110 MMHG | DIASTOLIC BLOOD PRESSURE: 76 MMHG | TEMPERATURE: 98.5 F | HEART RATE: 85 BPM | HEIGHT: 63 IN | BODY MASS INDEX: 28.88 KG/M2

## 2025-03-18 DIAGNOSIS — N23 RENAL COLIC: Primary | ICD-10-CM

## 2025-03-18 PROCEDURE — 2580000003 HC RX 258: Performed by: NURSE ANESTHETIST, CERTIFIED REGISTERED

## 2025-03-18 PROCEDURE — C2617 STENT, NON-COR, TEM W/O DEL: HCPCS | Performed by: UROLOGY

## 2025-03-18 PROCEDURE — 6360000002 HC RX W HCPCS: Performed by: NURSE ANESTHETIST, CERTIFIED REGISTERED

## 2025-03-18 PROCEDURE — 3700000001 HC ADD 15 MINUTES (ANESTHESIA): Performed by: UROLOGY

## 2025-03-18 PROCEDURE — 6360000002 HC RX W HCPCS: Performed by: UROLOGY

## 2025-03-18 PROCEDURE — C1747 HC ENDOSCOPE, SINGLE, URINARY TRACT: HCPCS | Performed by: UROLOGY

## 2025-03-18 PROCEDURE — 7100000010 HC PHASE II RECOVERY - FIRST 15 MIN: Performed by: UROLOGY

## 2025-03-18 PROCEDURE — C1769 GUIDE WIRE: HCPCS | Performed by: UROLOGY

## 2025-03-18 PROCEDURE — 6370000000 HC RX 637 (ALT 250 FOR IP): Performed by: NURSE ANESTHETIST, CERTIFIED REGISTERED

## 2025-03-18 PROCEDURE — 7100000000 HC PACU RECOVERY - FIRST 15 MIN: Performed by: UROLOGY

## 2025-03-18 PROCEDURE — 3700000000 HC ANESTHESIA ATTENDED CARE: Performed by: UROLOGY

## 2025-03-18 PROCEDURE — 3600000014 HC SURGERY LEVEL 4 ADDTL 15MIN: Performed by: UROLOGY

## 2025-03-18 PROCEDURE — 7100000001 HC PACU RECOVERY - ADDTL 15 MIN: Performed by: UROLOGY

## 2025-03-18 PROCEDURE — 7100000011 HC PHASE II RECOVERY - ADDTL 15 MIN: Performed by: UROLOGY

## 2025-03-18 PROCEDURE — 2709999900 HC NON-CHARGEABLE SUPPLY: Performed by: UROLOGY

## 2025-03-18 PROCEDURE — 6370000000 HC RX 637 (ALT 250 FOR IP): Performed by: UROLOGY

## 2025-03-18 PROCEDURE — 3600000004 HC SURGERY LEVEL 4 BASE: Performed by: UROLOGY

## 2025-03-18 PROCEDURE — 2500000003 HC RX 250 WO HCPCS: Performed by: NURSE ANESTHETIST, CERTIFIED REGISTERED

## 2025-03-18 DEVICE — URETERAL STENT
Type: IMPLANTABLE DEVICE | Site: URETER | Status: FUNCTIONAL
Brand: PERCUFLEX™ PLUS

## 2025-03-18 RX ORDER — SODIUM CHLORIDE, SODIUM LACTATE, POTASSIUM CHLORIDE, CALCIUM CHLORIDE 600; 310; 30; 20 MG/100ML; MG/100ML; MG/100ML; MG/100ML
INJECTION, SOLUTION INTRAVENOUS CONTINUOUS
Status: DISCONTINUED | OUTPATIENT
Start: 2025-03-18 | End: 2025-03-18 | Stop reason: HOSPADM

## 2025-03-18 RX ORDER — KETOROLAC TROMETHAMINE 30 MG/ML
INJECTION, SOLUTION INTRAMUSCULAR; INTRAVENOUS
Status: DISCONTINUED | OUTPATIENT
Start: 2025-03-18 | End: 2025-03-18 | Stop reason: SDUPTHER

## 2025-03-18 RX ORDER — CEFAZOLIN SODIUM/WATER 2 G/20 ML
2000 SYRINGE (ML) INTRAVENOUS
Status: COMPLETED | OUTPATIENT
Start: 2025-03-18 | End: 2025-03-18

## 2025-03-18 RX ORDER — LIDOCAINE HYDROCHLORIDE 20 MG/ML
JELLY TOPICAL PRN
Status: DISCONTINUED | OUTPATIENT
Start: 2025-03-18 | End: 2025-03-18 | Stop reason: ALTCHOICE

## 2025-03-18 RX ORDER — SODIUM CHLORIDE 0.9 % (FLUSH) 0.9 %
5-40 SYRINGE (ML) INJECTION PRN
Status: DISCONTINUED | OUTPATIENT
Start: 2025-03-18 | End: 2025-03-18 | Stop reason: HOSPADM

## 2025-03-18 RX ORDER — DEXAMETHASONE SODIUM PHOSPHATE 4 MG/ML
INJECTION, SOLUTION INTRA-ARTICULAR; INTRALESIONAL; INTRAMUSCULAR; INTRAVENOUS; SOFT TISSUE
Status: DISCONTINUED | OUTPATIENT
Start: 2025-03-18 | End: 2025-03-18 | Stop reason: SDUPTHER

## 2025-03-18 RX ORDER — OXYCODONE AND ACETAMINOPHEN 5; 325 MG/1; MG/1
1 TABLET ORAL EVERY 6 HOURS PRN
Qty: 12 TABLET | Refills: 0 | Status: SHIPPED | OUTPATIENT
Start: 2025-03-18 | End: 2025-03-21

## 2025-03-18 RX ORDER — SODIUM CHLORIDE 9 MG/ML
INJECTION, SOLUTION INTRAVENOUS PRN
Status: DISCONTINUED | OUTPATIENT
Start: 2025-03-18 | End: 2025-03-18 | Stop reason: HOSPADM

## 2025-03-18 RX ORDER — FENTANYL CITRATE 50 UG/ML
INJECTION, SOLUTION INTRAMUSCULAR; INTRAVENOUS
Status: DISCONTINUED | OUTPATIENT
Start: 2025-03-18 | End: 2025-03-18 | Stop reason: SDUPTHER

## 2025-03-18 RX ORDER — DIMENHYDRINATE 50 MG
50 TABLET ORAL ONCE
Status: COMPLETED | OUTPATIENT
Start: 2025-03-18 | End: 2025-03-18

## 2025-03-18 RX ORDER — OXYCODONE AND ACETAMINOPHEN 5; 325 MG/1; MG/1
1 TABLET ORAL ONCE AS NEEDED
Status: COMPLETED | OUTPATIENT
Start: 2025-03-18 | End: 2025-03-18

## 2025-03-18 RX ORDER — PROPOFOL 10 MG/ML
INJECTION, EMULSION INTRAVENOUS
Status: DISCONTINUED | OUTPATIENT
Start: 2025-03-18 | End: 2025-03-18 | Stop reason: SDUPTHER

## 2025-03-18 RX ORDER — LIDOCAINE HYDROCHLORIDE 20 MG/ML
INJECTION, SOLUTION EPIDURAL; INFILTRATION; INTRACAUDAL; PERINEURAL
Status: DISCONTINUED | OUTPATIENT
Start: 2025-03-18 | End: 2025-03-18 | Stop reason: SDUPTHER

## 2025-03-18 RX ORDER — FENTANYL CITRATE 50 UG/ML
50 INJECTION, SOLUTION INTRAMUSCULAR; INTRAVENOUS EVERY 5 MIN PRN
Status: COMPLETED | OUTPATIENT
Start: 2025-03-18 | End: 2025-03-18

## 2025-03-18 RX ORDER — ONDANSETRON 2 MG/ML
INJECTION INTRAMUSCULAR; INTRAVENOUS
Status: DISCONTINUED | OUTPATIENT
Start: 2025-03-18 | End: 2025-03-18 | Stop reason: SDUPTHER

## 2025-03-18 RX ORDER — SODIUM CHLORIDE 0.9 % (FLUSH) 0.9 %
5-40 SYRINGE (ML) INJECTION EVERY 12 HOURS SCHEDULED
Status: DISCONTINUED | OUTPATIENT
Start: 2025-03-18 | End: 2025-03-18 | Stop reason: HOSPADM

## 2025-03-18 RX ORDER — DEXMEDETOMIDINE HYDROCHLORIDE 100 UG/ML
INJECTION, SOLUTION INTRAVENOUS
Status: DISCONTINUED | OUTPATIENT
Start: 2025-03-18 | End: 2025-03-18 | Stop reason: SDUPTHER

## 2025-03-18 RX ORDER — ACETAMINOPHEN 325 MG/1
650 TABLET ORAL ONCE
Status: COMPLETED | OUTPATIENT
Start: 2025-03-18 | End: 2025-03-18

## 2025-03-18 RX ORDER — NALOXONE HYDROCHLORIDE 0.4 MG/ML
INJECTION, SOLUTION INTRAMUSCULAR; INTRAVENOUS; SUBCUTANEOUS PRN
Status: DISCONTINUED | OUTPATIENT
Start: 2025-03-18 | End: 2025-03-18 | Stop reason: HOSPADM

## 2025-03-18 RX ADMIN — ACETAMINOPHEN 650 MG: 325 TABLET ORAL at 09:37

## 2025-03-18 RX ADMIN — SODIUM CHLORIDE, SODIUM LACTATE, POTASSIUM CHLORIDE, AND CALCIUM CHLORIDE: .6; .31; .03; .02 INJECTION, SOLUTION INTRAVENOUS at 09:51

## 2025-03-18 RX ADMIN — FAMOTIDINE 20 MG: 10 INJECTION, SOLUTION INTRAVENOUS at 09:50

## 2025-03-18 RX ADMIN — OXYCODONE HYDROCHLORIDE AND ACETAMINOPHEN 1 TABLET: 5; 325 TABLET ORAL at 12:23

## 2025-03-18 RX ADMIN — PROPOFOL 50 MG: 10 INJECTION, EMULSION INTRAVENOUS at 11:01

## 2025-03-18 RX ADMIN — Medication 2000 MG: at 11:03

## 2025-03-18 RX ADMIN — DEXMEDETOMIDINE HCL 4 MCG: 100 INJECTION INTRAVENOUS at 11:21

## 2025-03-18 RX ADMIN — FENTANYL CITRATE 50 MCG: 50 INJECTION INTRAMUSCULAR; INTRAVENOUS at 11:06

## 2025-03-18 RX ADMIN — LIDOCAINE HYDROCHLORIDE 5 ML: 20 INJECTION, SOLUTION EPIDURAL; INFILTRATION; INTRACAUDAL; PERINEURAL at 11:00

## 2025-03-18 RX ADMIN — FENTANYL CITRATE 50 MCG: 50 INJECTION INTRAMUSCULAR; INTRAVENOUS at 11:49

## 2025-03-18 RX ADMIN — FENTANYL CITRATE 50 MCG: 50 INJECTION INTRAMUSCULAR; INTRAVENOUS at 10:59

## 2025-03-18 RX ADMIN — DIMENHYDRINATE 50 MG: 50 TABLET ORAL at 09:37

## 2025-03-18 RX ADMIN — ONDANSETRON 4 MG: 2 INJECTION, SOLUTION INTRAMUSCULAR; INTRAVENOUS at 11:08

## 2025-03-18 RX ADMIN — KETOROLAC TROMETHAMINE 30 MG: 30 INJECTION, SOLUTION INTRAMUSCULAR at 11:21

## 2025-03-18 RX ADMIN — DEXAMETHASONE SODIUM PHOSPHATE 4 MG: 4 INJECTION INTRA-ARTICULAR; INTRALESIONAL; INTRAMUSCULAR; INTRAVENOUS; SOFT TISSUE at 11:08

## 2025-03-18 RX ADMIN — FENTANYL CITRATE 50 MCG: 50 INJECTION INTRAMUSCULAR; INTRAVENOUS at 11:55

## 2025-03-18 RX ADMIN — PROPOFOL 150 MG: 10 INJECTION, EMULSION INTRAVENOUS at 11:00

## 2025-03-18 ASSESSMENT — PAIN SCALES - GENERAL
PAINLEVEL_OUTOF10: 6
PAINLEVEL_OUTOF10: 7

## 2025-03-18 ASSESSMENT — PAIN DESCRIPTION - DESCRIPTORS
DESCRIPTORS: SORE
DESCRIPTORS: SORE
DESCRIPTORS: DISCOMFORT;BURNING
DESCRIPTORS: SORE
DESCRIPTORS: SORE

## 2025-03-18 ASSESSMENT — PAIN - FUNCTIONAL ASSESSMENT
PAIN_FUNCTIONAL_ASSESSMENT: FACE, LEGS, ACTIVITY, CRY, AND CONSOLABILITY (FLACC)
PAIN_FUNCTIONAL_ASSESSMENT: ACTIVITIES ARE NOT PREVENTED
PAIN_FUNCTIONAL_ASSESSMENT: FACE, LEGS, ACTIVITY, CRY, AND CONSOLABILITY (FLACC)
PAIN_FUNCTIONAL_ASSESSMENT: 0-10
PAIN_FUNCTIONAL_ASSESSMENT: ACTIVITIES ARE NOT PREVENTED
PAIN_FUNCTIONAL_ASSESSMENT: 0-10
PAIN_FUNCTIONAL_ASSESSMENT: 0-10
PAIN_FUNCTIONAL_ASSESSMENT: FACE, LEGS, ACTIVITY, CRY, AND CONSOLABILITY (FLACC)
PAIN_FUNCTIONAL_ASSESSMENT: ACTIVITIES ARE NOT PREVENTED
PAIN_FUNCTIONAL_ASSESSMENT: 0-10
PAIN_FUNCTIONAL_ASSESSMENT: 0-10
PAIN_FUNCTIONAL_ASSESSMENT: FACE, LEGS, ACTIVITY, CRY, AND CONSOLABILITY (FLACC)

## 2025-03-18 ASSESSMENT — PAIN DESCRIPTION - LOCATION: LOCATION: FLANK;BACK

## 2025-03-18 ASSESSMENT — PAIN DESCRIPTION - ORIENTATION: ORIENTATION: RIGHT

## 2025-03-18 NOTE — PROGRESS NOTES
Assisted patient up to that bathroom.  Patient tolerated well and verbalized understanding of call-light.

## 2025-03-18 NOTE — DISCHARGE INSTRUCTIONS
SAME DAY SURGERY DISCHARGE INSTRUCTIONS    1.  Do not drive or operate hazardous machinery for 24 hours.    2.  Do not make important personal or business decisions for 24 hours.    3.  Do not drink alcoholic beverages for 24 hours.    4.  Do not smoke tobacco products for 24 hours.    5.  Eat light foods (Jell-O, soups, etc....) and drink plenty of fluids (water, Sprite, etc...) up to 8 glasses per day, as you can tolerate.    6.  Limit your activities for 24 hours.  Do not engage in heavy work until your surgeon gives you permission.      7.  Patient should not be left alone for 12-24 hours following surgical procedure.    8.  Wash hands before and after incision care.  It is important to practice good personal hygiene during the post op period.    9.  Call your surgeon for any questions regarding your surgery.    CYSTOSCOPY DISCHARGE INSTRUCTIONS    Possible burning during urination and/or blood tinged urine.    Drink 6-8 glasses of water for the next day or so.  (This helps to flush the urinary tract.)    Call Dr. Lloyd ([phone]) if you develop:    Fever over 100 degrees  Prolonged soreness/pain  Unusual bleeding/bruising  Unable to urinate or if urine is bloody  You cannot pass urine 8 hours after the test.  You have pain in your belly or your back just below your rib cage.  (This is called flank pain.)  You have frequent urge to urinate but can pass only small amounts of urine.    Call Dr. Lloyd office for follow-up appointment ([phone]).

## 2025-03-18 NOTE — ANESTHESIA POSTPROCEDURE EVALUATION
Department of Anesthesiology  Postprocedure Note    Patient: Linda Lennon  MRN: [id number]  YOB: 1990  Date of evaluation: 3/18/2025    Procedure Summary       Date: 03/18/25 Room / Location: [facility]    Anesthesia Start: 1055 Anesthesia Stop: 1137    Procedure: CYSTOSCOPY URETEROSCOPY LASER-right ureteroscopy/thulium laser lithotripsy with right ureteral stent placement/exchange (Right) Diagnosis:       Calculus of kidney      Renal colic      (Calculus of kidney [N20.0])      (Renal colic [N23])    Surgeons: Rome Lloyd MD Responsible Provider: Joy Miller APRN - CRNA    Anesthesia Type: epidural, general ASA Status: 2            Anesthesia Type: No value filed.    Yana Phase I: Yana Score: 10    Yana Phase II: Yana Score: 10    Anesthesia Post Evaluation    Patient location during evaluation: bedside  Patient participation: complete - patient participated  Level of consciousness: awake and alert  Pain score: 5  Airway patency: patent  Nausea & Vomiting: no nausea and no vomiting  Cardiovascular status: hemodynamically stable  Respiratory status: acceptable  Hydration status: stable  Pain management: adequate        No notable events documented.

## 2025-03-18 NOTE — ANESTHESIA PRE PROCEDURE
for input(s): \"POCGLU\", \"POCNA\", \"POCK\", \"POCCL\", \"POCBUN\", \"POCHEMO\", \"POCHCT\" in the last 72 hours.    Coags: No results found for: \"PROTIME\", \"INR\", \"APTT\"    HCG (If Applicable):   Lab Results   Component Value Date    PREGTESTUR NEGATIVE 10/10/2024    PREGSERUM NEGATIVE 01/28/2025    HCGQUANT 25,664 (H) 11/07/2018        ABGs: No results found for: \"PHART\", \"PO2ART\", \"WKA6CSV\", \"PZM1XFD\", \"BEART\", \"J0KKBUGN\"     Type & Screen (If Applicable):  No results found for: \"LABABO\"    Drug/Infectious Status (If Applicable):  Lab Results   Component Value Date/Time    HEPCAB NONREACTIVE 10/06/2020 02:09 PM       COVID-19 Screening (If Applicable):   Lab Results   Component Value Date/Time    COVID19 NOT-DETECTED 07/16/2021 03:09 PM    COVID19 Not Detected 08/18/2020 12:19 PM           Anesthesia Evaluation  Patient summary reviewed and Nursing notes reviewed   no history of anesthetic complications:   Airway: Mallampati: I  TM distance: >3 FB   Neck ROM: full  Mouth opening: > = 3 FB   Dental: normal exam         Pulmonary:Negative Pulmonary ROS and normal exam                               Cardiovascular:  Exercise tolerance: good (>4 METS)  (+) hypertension:         Beta Blocker:  Not on Beta Blocker         Neuro/Psych:   Negative Neuro/Psych ROS     (-) psychiatric history           GI/Hepatic/Renal:   (+) GERD: no interval change, renal disease: kidney stones          Endo/Other:                     Abdominal: normal exam     Obesity: G5, P4, induction for failed BPP.       Vascular: negative vascular ROS.         Other Findings:             Anesthesia Plan      epidural and general     ASA 2       Induction: intravenous.    MIPS: Postoperative opioids intended and Prophylactic antiemetics administered.  Anesthetic plan and risks discussed with patient.      Plan discussed with CRNA.                    FÁTIMA Rodriguez - NADIYA   3/18/2025

## 2025-03-18 NOTE — H&P
History and Physical    Patient:  Linda Lennon  MRN: [id number]    CHIEF COMPLAINT:  right flank pain    HISTORY OF PRESENT ILLNESS:   The patient is a 34 y.o. female who presents with right flank pain. Here today with complaints of right flank pain that radiates to her RLQ.  Since CT done on 3/5/2025 showed a distal right ureteral stone measuring approximately 3 mm with moderate right hydronephrosis.  The radiologist also notes bilateral nephrolithiasis and distribution compatible with medullary calcinosis, similar to prior study.  During ER evaluation she did have normal renal labs.  WBC was 12.3.  She did pass a 3 mm stone since being in the ER.  She did bring this with her today to her visit.  She continues to complain of right flank pain that radiates to her right lower quadrant.  She denies any dysuria or gross hematuria.     Past Medical History:    Past Medical History:   Diagnosis Date    Anemia     GERD (gastroesophageal reflux disease)     Kidney stones     Postpartum depression        Past Surgical History:    Past Surgical History:   Procedure Laterality Date    EXTERNAL EAR SURGERY Left     LITHOTRIPSY Right 08/25/2020    ESWL EXTRACORPOREAL SHOCK WAVE LITHOTRIPSY     LITHOTRIPSY Right 8/25/2020    ESWL EXTRACORPOREAL SHOCK WAVE LITHOTRIPSY performed by Rome Lloyd MD at [facility] OR    OTHER SURGICAL HISTORY Right 5/26/2016    right cysto HLL with stent exchange    PAIN MANAGEMENT PROCEDURE N/A 8/17/2023    EPIDURAL STEROID INJECTION-LUMBAR L5-S1 INTULAMENAL EPIDURAL STEROID INJECTION performed by Armin Vasquez DO at [facility] OR    PAIN MANAGEMENT PROCEDURE Bilateral 10/11/2023    LUMBAR MEDIAL BRANCH BLOCK-L3, L4, L5 performed by Armin Vasquez DO at [facility] OR    PAIN MANAGEMENT PROCEDURE Bilateral 11/8/2023    LUMBAR MEDIAL BRANCH BLOCK-L3, L4, L5 performed by Armin Vasquez DO at [facility] OR    PAIN MANAGEMENT PROCEDURE Right 1/18/2024    LUMBAR RFA L3, L4, L5 MEDIAL BRANCH performed by Christina  DO Armin at [facility] OR    TUBAL LIGATION N/A 7/21/2021    TUBAL LIGATION performed by Jesus Barrett MD at [facility] OR    UPPER GASTROINTESTINAL ENDOSCOPY      VAGINA SURGERY      LABIA HEMATOMA       Medications Prior to Admission:    Prior to Admission medications    Medication Sig Start Date End Date Taking? Authorizing Provider   amLODIPine (NORVASC) 5 MG tablet Take 1 tablet by mouth daily 1/29/25   Avani Oglesby MD   docusate sodium (COLACE) 100 MG capsule as needed 3/7/25   Avani Oglesby MD   ondansetron (ZOFRAN-ODT) 4 MG disintegrating tablet as needed 3/5/25   Avani Oglesby MD   pregabalin (LYRICA) 200 MG capsule Take 1 capsule by mouth 2 times daily. 3/1/25   Avani Oglesby MD   tamsulosin (FLOMAX) 0.4 MG capsule Take 1 capsule by mouth daily 3/11/25   Diana Mauricio APRN - CNP   DULoxetine (CYMBALTA) 30 MG extended release capsule Take 1 capsule by mouth daily    Avani Oglesby MD   ibuprofen (ADVIL;MOTRIN) 800 MG tablet take 1 tablet by mouth every 8 hours if needed for cramping 2/28/23   Avani Oglesby MD   omeprazole (PRILOSEC) 20 MG delayed release capsule Take 1 capsule by mouth daily    Avani Oglesby MD   traMADol (ULTRAM) 50 MG tablet  6/13/23   Avani Oglesby MD       Allergies:  Cyclobenzaprine, Metoclopramide, Compazine [prochlorperazine], and Norco [hydrocodone-acetaminophen]    Social History:    Social History     Socioeconomic History    Marital status:      Spouse name: Not on file    Number of children: Not on file    Years of education: Not on file    Highest education level: Not on file   Occupational History    Not on file   Tobacco Use    Smoking status: Never    Smokeless tobacco: Never   Vaping Use    Vaping status: Never Used   Substance and Sexual Activity    Alcohol use: Not Currently     Comment: SOCIAL    Drug use: No    Sexual activity: Yes     Partners: Male   Other Topics Concern    Not on file   Social History

## 2025-03-18 NOTE — OP NOTE
[address] [address]                            OPERATIVE REPORT      PATIENT NAME: RUDY GONZALEZ           : 1990  MED REC NO: [id number]                          ROOM: [facility]  ACCOUNT NO: [id number]                       ADMIT DATE: 2025  PROVIDER: Rome Lloyd MD      DATE OF PROCEDURE:  2025    SURGEON:  Rome Lloyd MD    ASSISTANT:  None.    PREOPERATIVE DIAGNOSES:    1. Right ureteral calculus.  2. Right renal calculus.    POSTOPERATIVE DIAGNOSIS:  Right renal calculus.    PROCEDURES:  Cystoscopy, right ureteroscopy, right thulium laser lithotripsy, right ureteral stent placement.    ANESTHESIA:  General.    COMPLICATIONS:  None.    ESTIMATED BLOOD LOSS:  Minimal.    SPECIMENS:  None.    PROSTHESIS:  6-[place] x 24 cm double-J ureteral stent.    DISPOSITION:  Stable.    FINDINGS:  Multiple stones in the right kidney.    INDICATIONS:  The patient is a 34-year-old female with extensive stone history, here now for definitive therapy on the right side.    DESCRIPTION OF PROCEDURE:  The patient was taken back to the operating room after informed consent including all risks, benefits, and alternatives was obtained.  The patient was transferred from [facility] onto the operative table, where she was induced under general anesthesia, given IV Ancef for preoperative antibiotic prophylaxis.  To begin the case, prepped and draped in normal sterile fashion, placed in dorsal lithotomy.  A 22-[place] sheath was passed through the urethra into the bladder.  Once in the bladder, we identified the right ureteral orifice.  A 0.035-inch wire was passed up.  We then placed a dual-lumen catheter over the Glidewire and placed a flexible ureteroscope over the Glidewire up in the kidney.  Glidewire was removed.  We identified stones in the kidney.  We were able to then ablate these stones adequately using a 270-micron  laser fiber.  Once this was done, we were able to remove the scope leaving the guidewire in place, placed the scope over the Glidewire and placed a 6-Vincentian x 24 cm double-J ureteral stent over the Glidewire in the kidney.  Glidewire was removed.  Proximal curl was confirmed by fluoroscopy.  Distal curl was confirmed by visualization.  At this point in time, stent string was attached to the right thigh with Steri-Strips and benzoin.  She was awoken from general anesthesia, transferred to [facility], taken to the PACU in satisfactory condition by Nursing and Anesthesia teams.    PLAN:  The patient will be discharged home per PACU criteria and follow up with us in 2 to 3 days for stent removal via string.          MADELEINE EMANUEL MD      D:  03/18/2025 14:58:31     T:  03/18/2025 19:43:02     BENJI/NARGIS  Job #:  [id number]     Doc#:  [id number]

## 2025-03-20 ENCOUNTER — TELEPHONE (OUTPATIENT)
Dept: UROLOGY | Age: 35
End: 2025-03-20

## 2025-03-20 DIAGNOSIS — N13.2 URETERAL STONE WITH HYDRONEPHROSIS: Primary | ICD-10-CM

## 2025-03-20 RX ORDER — KETOROLAC TROMETHAMINE 10 MG/1
10 TABLET, FILM COATED ORAL EVERY 8 HOURS PRN
Qty: 15 TABLET | Refills: 0 | Status: SHIPPED | OUTPATIENT
Start: 2025-03-20 | End: 2026-03-20

## 2025-03-20 NOTE — TELEPHONE ENCOUNTER
We did review note from [facility].  Patient has normal white blood cell count and normal kidney function.  There was a CT scan obtained.  We did review the report.  There was no obstructing stones.    \"Moderate right collecting system dilatation extending to the right UVJ, no obstructing calculus seen. Gas within right collecting system, likely related to recent procedure.  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  She may also experience burning with urination, frequency, urgency, bladder spasms, and blood in the urine. All of this should continue to improve over the next several days. The blood in the urine can last up to two weeks.       take Flomax for the next 72 hours.    drink at least 80 oz fluid (water, juice, Gatorade - NOT tea, coffee, soda pop) daily    We will send some Toradol, she cannot take ibuprofen or naproxen with this medication.    She did have an appointment scheduled for tomorrow for stent pull as her stent was pulled she does not need to keep this appointment unless she wants follow-up.  I did order a KUB for 6 weeks from now    If she develops fever or chills she needs to return to the emergency room.

## 2025-03-20 NOTE — TELEPHONE ENCOUNTER
Patient had RTLL on 3/18.  Patient reports she pulled her own stent yesterday.  She did not call the office for advice, she states she was told she could pull on her own.  Patient calls in reporting she is having so much pain- constant rt flank pain.  She reports she is taking percocet and 800mg of ibuprofen.  She was evaluated in the ER as well. Please advise, thank you.

## 2025-03-20 NOTE — TELEPHONE ENCOUNTER
Writer calls patient with this information, she verbalizes understanding. She states she is going to keep her appt just in case, and she will call us in the AM if she decides she does not need it.  6W appt made for MARIA ELENA.

## 2025-05-05 ENCOUNTER — HOSPITAL ENCOUNTER (OUTPATIENT)
Dept: PHYSICAL THERAPY | Age: 35
Setting detail: THERAPIES SERIES
Discharge: HOME | End: 2025-05-05
Payer: COMMERCIAL

## 2025-05-05 PROCEDURE — 97161 PT EVAL LOW COMPLEX 20 MIN: CPT

## 2025-05-05 NOTE — PROGRESS NOTES
[facility]           Phone: [phone]             Outpatient Physical Therapy  Fax: [phone]                                           Date: 2025  Patient: Linda Lennon : 1990 CSN #: [id number]   Referring Physician: Minna White APR*      [x] Plan of Care   [] Updated Plan of Care    Dates of Service to Include: 2025 to 25    Diagnosis:  M53.3  M51.26     Rehab (Treatment) Diagnosis:  low back pain with radiation           Attendance  Total # of Visits to Date: 1 No Show: 0 Canceled Appointment: 0    Assessment  Assessment: Pt is a 34 y.o. female who presents with chronic low back pain which has been diagnosed as herniated discs at L5 and S1. Pt with R radicular tingling to the foot and L to the knee. Pain worse with prolonged sitting which pt can only do for 15 minutes at a time. Does get worse with prolonged ambultion but able to walk about a mile before symptoms increase. (+) slump and SLR on R with worsening radicular symptoms, (+) slump and SLR on L with incrased cental back pain only. B hip strength 4/5 grossly. Lumbar ROM limited in flexion at end range with (+) Gowers when returning to standing and B sidebend with increased pain. Pt would benefit from skilled therapy in order to address these deficits and return to PLOF.      Goals  Short Term Goals  Time Frame for Short Term Goals: 2 weeks  Short Term Goal 1: Pt will be inititated with HEP.  Short Term Goal 2: Pt will tolerate 30-40 minutes of aq ther ex to improve function with ADL's.  Long Term Goals  Time Frame for Long Term Goals : 6 weeks  Long Term Goal 1: Pt will independant and compliant with HEP to maintain functional gains made at therapy.  Long Term Goal 2: Pt will report 2/10 or less low back pain on average to facilitate completion of ADL's.  Long Term Goal 3: Pt to improve lumbar flexion to full with min to

## 2025-05-05 NOTE — PROGRESS NOTES
Phone: [phone]                       [facility]          Fax: [phone]                      Outpatient Physical Therapy                                                                      Evaluation  Date: 2025  Patient: Linda Lennon  : 1990  CSN #: [id number]    Referring Physician: Minna White, APR*    Medical Diagnosis: M53.3  M51.26    Treatment Diagnosis: low back pain with radiation  PT Insurance Information: [facility]  Total # of Visits Approved: 8   Total # of Visits to Date: 1  No Show: 0  Canceled Appointment: 0    [x] This writer acknowledges review of patient history form     Subjective  Subjective: Pt with chronic low back pain that has been worsening since . Imaging showing 2 herniated discs at L5 and S1. Pt reports numbness/tingling from the back down both legs, L leg to knee and R leg to foot. Reports ablaisions have not seemed to help too much.  Additional Pertinent Hx: HTN    Objective      Strength       Strength LLE  L Hip Flexion: 4-/5  L Hip ABduction: 4/5  L Hip ADduction: 4/5  L Knee Flexion: 4+/5  L Knee Extension: 4+/5    Right Strength         Strength RLE  R Hip Flexion: 4-/5  R Hip ABduction: 4/5  R Hip ADduction: 4/5  R Knee Flexion: 4+/5  R Knee Extension: 4+/5              Lumbar Assessment     AROM Lumbar Spine   Flexion: 74  Extension: 38  Lateral Flexion Right: 34  Lateral Flexion Left: 39       Special Tests:   Special Tests Lumbar Spine  SLR: R (+), L (+) (worse R)  Slump Test: R (+), L (+) (worse R)    Exercises:  Exercise 1: HEP: prone lying, standing hip ext, standing lumbar ext  Exercise 2: *focus ext exercises, dont force lumbar flexion exercises      Assessment  Assessment: Pt is a 34 y.o. female who presents with chronic low back pain which has been diagnosed as herniated discs at L5 and S1. Pt with R radicular tingling to the foot and L to the knee. Pain worse with prolonged sitting which pt can only do for 15

## 2025-05-09 ENCOUNTER — HOSPITAL ENCOUNTER (OUTPATIENT)
Dept: PHYSICAL THERAPY | Age: 35
Setting detail: THERAPIES SERIES
Discharge: HOME | End: 2025-05-09
Payer: COMMERCIAL

## 2025-05-09 NOTE — PROGRESS NOTES
[facility]  Inpatient/Observation/Outpatient Rehabilitation    Date: 2025  Patient Name: Linda Lennon       [] Inpatient Acute/Observation       [x]  Outpatient  : 1990     Plan of Care/Recert ends      [] Pt refused/declined therapy at this time due to:           [x] Pt cancelled due to:  [] No Reason Given   [] Sick/ill   [x] Other:  personal reasons     [] Evaluation held by RN/Provider/Physical Therapist due to:    [] High Heart Rate   [] High Blood Pressure   [] Orthopedic Consult   [] Hgb < 7   [] Other:    [] Pt ordered brace per physician request:  [] Proper fit will be completed and education for wearing/skin checks    [] Pt does not require skilled services due to:      Therapist/Assistant will attempt to see this patient, at our earliest opportunity.       Himanshu Garcia Date: 2025

## 2025-05-12 ENCOUNTER — APPOINTMENT (OUTPATIENT)
Dept: PHYSICAL THERAPY | Age: 35
End: 2025-05-12
Payer: COMMERCIAL

## 2025-05-15 ENCOUNTER — HOSPITAL ENCOUNTER (OUTPATIENT)
Dept: PHYSICAL THERAPY | Age: 35
Setting detail: THERAPIES SERIES
Discharge: HOME | End: 2025-05-15
Payer: COMMERCIAL

## 2025-05-15 PROCEDURE — 97113 AQUATIC THERAPY/EXERCISES: CPT

## 2025-05-15 NOTE — PROGRESS NOTES
Phone: [phone]                 [facility]           Fax: [phone]                           Outpatient Physical Therapy                                                                            Daily Note    Patient: Linda Lennon : 1990  CSN #: [id number]   Referring Physician: Minna White, APR*  Date: 5/15/2025       Treatment Diagnosis: low back pain with radiation    PT Insurance Information: [facility]  Total # of Visits Approved: 8 Per Physician Order  Total # of Visits to Date: 2  No Show: 0  Canceled Appointment: 1    25 Plan of Care/Recert Due    Pre-Treatment Pain:  8/10  Subjective: Patient presents to aquatic therapy with 8/10 LBP with radicular symptoms down RLE. Pt states she has started walking more and has had increased B hip and knee discomfort.    Exercises:  Exercise 1: HEP: prone lying, standing hip ext, standing lumbar ext  Exercise 2: *focus ext exercises, dont force lumbar flexion exercises  Exercise 3: aq: offloaded enviorment to reduce compressive forces  Exercise 4: aq: shallow sink exercises x20  Exercise 5: aq: semi deep shoulder matrix double dumbbells with TrA activation  Exercise 6: aq: deep well hang 5 min for relaxation  Exercise 7: aq: shallow fwd/lat/retro ambulation x3 laps ea    Assessment  Assessment: Initiated aquatic therapy in an offloaded enviornment to reduce compressive forces with good tolerance. Education provided throughout session this date on TrA activation and postural awareness. HEP given for core/pelvic strengthening and stability. Pt reports discomfort with hip extension this date with no improvements in pain following session. Continue per tolerance.    Activity Tolerance  Activity Tolerance: Patient tolerated treatment well, Patient limited by pain    Patient Education  Patient Education: Pt educatated on PT POC, HEP  Pt verbalized/demonstrated good understanding:     [x] Yes         [] No, pt required

## 2025-05-16 ENCOUNTER — APPOINTMENT (OUTPATIENT)
Dept: PHYSICAL THERAPY | Age: 35
End: 2025-05-16
Payer: COMMERCIAL

## 2025-05-19 ENCOUNTER — HOSPITAL ENCOUNTER (OUTPATIENT)
Dept: PHYSICAL THERAPY | Age: 35
Setting detail: THERAPIES SERIES
Discharge: HOME | End: 2025-05-19
Payer: COMMERCIAL

## 2025-05-19 PROCEDURE — 97113 AQUATIC THERAPY/EXERCISES: CPT

## 2025-05-19 NOTE — PROGRESS NOTES
Physical Therapy  Phone: [phone]                 [facility]           Fax: [phone]                           Outpatient Physical Therapy                                                                            Daily Note    Patient: Linda Lennon : 1990  CSN #: [id number]   Referring Physician: Minna White, APR*  Date: 2025     Treatment Diagnosis: low back pain with radiation    PT Insurance Information: [facility]  Total # of Visits Approved: 8 Per Physician Order  Total # of Visits to Date: 3  No Show: 0  Canceled Appointment: 1    25 Plan of Care/Recert Due    Pre-Treatment Pain:  7/10  Subjective: Pt rates pain 7/10 in mostly R Lower lumbar but denied pain down LEs. Pt notes she did a lot of sitting for work yesterday which caused increased pain. Pt notes she has been completing HEP at home with no concerns.    Exercises:  Exercise 4: aq: shallow sink exercises x20  Exercise 5: aq: semi deep shoulder HABD, ext, chest press double dumbbells with TrA activation x15 ea  Exercise 6: aq: deep well hang 5 min for relaxation  Exercise 7: aq: shallow fwd/lat/retro ambulation x3 laps ea  Exercise 8: Lumbar ext stretch at pool side x10 (DC NV pt having discomfort following stretch).    Assessment  Assessment: VCs provided with shld TA activation to prevent trunk flexion compensations, good carryover. Pt able to complete ambulation drills with minimal UE support, cues given throughout to maintain core activation. Pt having decreased pain until completion of lumbar ext stretch.    Activity Tolerance  Activity Tolerance: Patient tolerated treatment well, Patient limited by pain    Patient Education  Patient Education: Educated pt to continue with core exer for HEP to support lumbar spine.  Pt verbalized/demonstrated good understanding:     [x] Yes         [] No, pt required further clarification.     Post Treatment Pain:  6/10    Plan  Plan Frequency: 2  Plan

## 2025-05-23 ENCOUNTER — HOSPITAL ENCOUNTER (OUTPATIENT)
Dept: PHYSICAL THERAPY | Age: 35
Setting detail: THERAPIES SERIES
Discharge: HOME | End: 2025-05-23
Payer: COMMERCIAL

## 2025-05-23 NOTE — PROGRESS NOTES
[facility]  Inpatient/Observation/Outpatient Rehabilitation    Date: 2025  Patient Name: Linda Lennon       [] Inpatient Acute/Observation       [x]  Outpatient  : 1990       Plan of Care/Recert ends      [] Pt refused/declined therapy at this time due to:           [x] Pt cancelled due to:  [] No Reason Given   [x] Sick/ill   [] Other:      [] Evaluation held by RN/Provider/Physical Therapist due to:    [] High Heart Rate   [] High Blood Pressure   [] Orthopedic Consult   [] Hgb < 7   [] Other:    [] Pt ordered brace per physician request:  [] Proper fit will be completed and education for wearing/skin checks    [] Pt does not require skilled services due to:      Therapist/Assistant will attempt to see this patient, at our earliest opportunity.       Bernice Osborn Date: 2025

## 2025-05-30 ENCOUNTER — HOSPITAL ENCOUNTER (OUTPATIENT)
Dept: PHYSICAL THERAPY | Age: 35
Setting detail: THERAPIES SERIES
End: 2025-05-30
Payer: COMMERCIAL

## 2025-05-30 NOTE — PROGRESS NOTES
Physical Therapy  [facility]  Inpatient/Observation/Outpatient Rehabilitation    Date: 2025  Patient Name: Linda Lennon       [] Inpatient Acute/Observation       [x]  Outpatient  : 1990       Plan of Care/Recert ends      [] Pt refused/declined therapy at this time due to:           [x] Pt cancelled due to:  [] No Reason Given   [] Sick/ill   [x] Other:  had kidney stones and has a yeast infection because of it    [] Evaluation held by RN/Provider/Physical Therapist due to:    [] High Heart Rate   [] High Blood Pressure   [] Orthopedic Consult   [] Hgb < 7   [] Other:    [] Pt ordered brace per physician request:  [] Proper fit will be completed and education for wearing/skin checks    [] Pt does not require skilled services due to:      Therapist/Assistant will attempt to see this patient, at our earliest opportunity.       Bernice Osborn Date: 2025

## 2025-06-02 ENCOUNTER — HOSPITAL ENCOUNTER (OUTPATIENT)
Dept: PHYSICAL THERAPY | Age: 35
Setting detail: THERAPIES SERIES
Discharge: HOME | End: 2025-06-02

## 2025-06-02 NOTE — PROGRESS NOTES
Physical Therapy  [facility]  Inpatient/Observation/Outpatient Rehabilitation    Date: 2025  Patient Name: Linda Lennon       [] Inpatient Acute/Observation       [x]  Outpatient  : 1990       Plan of Care/Recert ends      [] Pt refused/declined therapy at this time due to:           [x] Pt cancelled due to:  [] No Reason Given   [] Sick/ill   [x] Other:  finishing yeast infection medicine and doesn't want to make it worse    [] Evaluation held by RN/Provider/Physical Therapist due to:    [] High Heart Rate   [] High Blood Pressure   [] Orthopedic Consult   [] Hgb < 7   [] Other:    [] Pt ordered brace per physician request:  [] Proper fit will be completed and education for wearing/skin checks    [] Pt does not require skilled services due to:      Therapist/Assistant will attempt to see this patient, at our earliest opportunity.       Bernice Osborn Date: 2025

## 2025-06-06 ENCOUNTER — HOSPITAL ENCOUNTER (OUTPATIENT)
Dept: PHYSICAL THERAPY | Age: 35
Setting detail: THERAPIES SERIES
Discharge: HOME | End: 2025-06-06

## 2025-06-06 NOTE — PROGRESS NOTES
Physical Therapy  [facility]  Inpatient/Observation/Outpatient Rehabilitation    Date: 2025  Patient Name: Linda Lennon       [] Inpatient Acute/Observation       [x]  Outpatient  : 1990       Plan of Care/Recert ends      [] Pt refused/declined therapy at this time due to:           [x] Pt cancelled due to:  [x] No Reason Given   [] Sick/ill   [] Other:      [] Evaluation held by RN/Provider/Physical Therapist due to:    [] High Heart Rate   [] High Blood Pressure   [] Orthopedic Consult   [] Hgb < 7   [] Other:    [] Pt ordered brace per physician request:  [] Proper fit will be completed and education for wearing/skin checks    [] Pt does not require skilled services due to:      Therapist/Assistant will attempt to see this patient, at our earliest opportunity.       Bernice Osborn Date: 2025

## 2025-06-23 ENCOUNTER — TELEPHONE (OUTPATIENT)
Dept: UROLOGY | Age: 35
End: 2025-06-23

## 2025-06-23 NOTE — TELEPHONE ENCOUNTER
Left a message for the patient to advise she DOES need to have KUB prior to her appointment on 07/07/2025.  Advised patient to call the office if she has any questions.

## 2025-06-23 NOTE — TELEPHONE ENCOUNTER
Patient reports she had a CT on 05/24/25 at [facility].  Patient states she was informed this would mean that she did not have to complete her KUB.  Please advise if the patient will still need her KUB.         Phone call to Radiology at [facility] and spoke with Juliano.  Juliano will push the CT to PACS at [facility].    Phone call to [facility] Radiology and spoke with Anaya.  Anaya reports the scan is not yet in the system.

## 2025-06-24 ENCOUNTER — TRANSCRIBE ORDERS (OUTPATIENT)
Dept: ADMINISTRATIVE | Age: 35
End: 2025-06-24

## 2025-06-24 DIAGNOSIS — H90.12 CONDUCTIVE HEARING LOSS OF LEFT EAR, UNSPECIFIED HEARING STATUS ON CONTRALATERAL SIDE: Primary | ICD-10-CM

## 2025-06-24 DIAGNOSIS — R42 VERTIGO: ICD-10-CM

## 2025-06-24 DIAGNOSIS — Z98.890 HISTORY OF EAR SURGERY: ICD-10-CM

## 2025-06-26 NOTE — TELEPHONE ENCOUNTER
Phone call to the patient and left a detailed message that she will need to have the KUB completed as described by the provider.  Requested the patient to call the office to confirm receipt of the information.

## 2025-07-08 ENCOUNTER — HOSPITAL ENCOUNTER (OUTPATIENT)
Dept: CT IMAGING | Age: 35
Discharge: HOME | End: 2025-07-10
Payer: COMMERCIAL

## 2025-07-08 DIAGNOSIS — H90.12 CONDUCTIVE HEARING LOSS OF LEFT EAR, UNSPECIFIED HEARING STATUS ON CONTRALATERAL SIDE: ICD-10-CM

## 2025-07-08 DIAGNOSIS — Z98.890 HISTORY OF EAR SURGERY: ICD-10-CM

## 2025-07-08 DIAGNOSIS — R42 VERTIGO: ICD-10-CM

## 2025-07-08 PROCEDURE — 70480 CT ORBIT/EAR/FOSSA W/O DYE: CPT

## 2025-07-10 ENCOUNTER — HOSPITAL ENCOUNTER (OUTPATIENT)
Age: 35
Discharge: HOME | End: 2025-07-10
Payer: COMMERCIAL

## 2025-07-10 ENCOUNTER — HOSPITAL ENCOUNTER (OUTPATIENT)
Dept: GENERAL RADIOLOGY | Age: 35
Discharge: HOME | End: 2025-07-12
Payer: COMMERCIAL

## 2025-07-10 DIAGNOSIS — N13.2 URETERAL STONE WITH HYDRONEPHROSIS: ICD-10-CM

## 2025-07-10 LAB
25(OH)D3 SERPL-MCNC: 26.9 NG/ML (ref 30–100)
ALBUMIN SERPL-MCNC: 4.1 G/DL (ref 3.5–5.2)
ALBUMIN/GLOB SERPL: 1.5 {RATIO} (ref 1–2.5)
ALP SERPL-CCNC: 97 U/L (ref 35–104)
ALT SERPL-CCNC: 21 U/L (ref 10–35)
ANION GAP SERPL CALCULATED.3IONS-SCNC: 11 MMOL/L (ref 9–16)
AST SERPL-CCNC: 26 U/L (ref 10–35)
BASOPHILS # BLD: 0.05 K/UL (ref 0–0.2)
BASOPHILS NFR BLD: 1 % (ref 0–2)
BILIRUB SERPL-MCNC: 0.3 MG/DL (ref 0–1.2)
BUN SERPL-MCNC: 10 MG/DL (ref 6–20)
BUN/CREAT SERPL: 17 (ref 9–20)
CALCIUM SERPL-MCNC: 9 MG/DL (ref 8.6–10.4)
CHLORIDE SERPL-SCNC: 104 MMOL/L (ref 98–107)
CHOLEST SERPL-MCNC: 212 MG/DL (ref 0–199)
CHOLESTEROL/HDL RATIO: 4.6
CO2 SERPL-SCNC: 23 MMOL/L (ref 20–31)
CREAT SERPL-MCNC: 0.6 MG/DL (ref 0.5–0.9)
EOSINOPHIL # BLD: 0.09 K/UL (ref 0–0.44)
EOSINOPHILS RELATIVE PERCENT: 1 % (ref 1–4)
ERYTHROCYTE [DISTWIDTH] IN BLOOD BY AUTOMATED COUNT: 12.1 % (ref 11.8–14.4)
FOLATE SERPL-MCNC: 13.9 NG/ML (ref 4.8–24.2)
GFR, ESTIMATED: >90 ML/MIN/1.73M2
GLUCOSE SERPL-MCNC: 87 MG/DL (ref 74–99)
HCT VFR BLD AUTO: 41.9 % (ref 36.3–47.1)
HDLC SERPL-MCNC: 46 MG/DL
HGB BLD-MCNC: 13.6 G/DL (ref 11.9–15.1)
IMM GRANULOCYTES # BLD AUTO: <0.03 K/UL (ref 0–0.3)
IMM GRANULOCYTES NFR BLD: 0 %
LDLC SERPL CALC-MCNC: 143 MG/DL (ref 0–100)
LYMPHOCYTES NFR BLD: 2.18 K/UL (ref 1.1–3.7)
LYMPHOCYTES RELATIVE PERCENT: 32 % (ref 24–43)
MCH RBC QN AUTO: 28.5 PG (ref 25.2–33.5)
MCHC RBC AUTO-ENTMCNC: 32.5 G/DL (ref 28.4–34.8)
MCV RBC AUTO: 87.7 FL (ref 82.6–102.9)
MONOCYTES NFR BLD: 0.3 K/UL (ref 0.1–1.2)
MONOCYTES NFR BLD: 4 % (ref 3–12)
NEUTROPHILS NFR BLD: 62 % (ref 36–65)
NEUTS SEG NFR BLD: 4.16 K/UL (ref 1.5–8.1)
NRBC BLD-RTO: 0 PER 100 WBC
PLATELET # BLD AUTO: 270 K/UL (ref 138–453)
PMV BLD AUTO: 10.3 FL (ref 8.1–13.5)
POTASSIUM SERPL-SCNC: 4.1 MMOL/L (ref 3.7–5.3)
PROT SERPL-MCNC: 6.9 G/DL (ref 6.6–8.7)
RBC # BLD AUTO: 4.78 M/UL (ref 3.95–5.11)
SODIUM SERPL-SCNC: 138 MMOL/L (ref 136–145)
T3FREE SERPL-MCNC: 3.66 PG/ML (ref 2–4.4)
T4 FREE SERPL-MCNC: 0.8 NG/DL (ref 0.92–1.68)
TRIGL SERPL-MCNC: 117 MG/DL
TSH SERPL DL<=0.05 MIU/L-ACNC: 1.12 UIU/ML (ref 0.27–4.2)
VIT B12 SERPL-MCNC: 363 PG/ML (ref 232–1245)
VLDLC SERPL CALC-MCNC: 23 MG/DL (ref 1–30)
WBC OTHER # BLD: 6.8 K/UL (ref 3.5–11.3)

## 2025-07-10 PROCEDURE — 36415 COLL VENOUS BLD VENIPUNCTURE: CPT

## 2025-07-10 PROCEDURE — 80053 COMPREHEN METABOLIC PANEL: CPT

## 2025-07-10 PROCEDURE — 82607 VITAMIN B-12: CPT

## 2025-07-10 PROCEDURE — 84443 ASSAY THYROID STIM HORMONE: CPT

## 2025-07-10 PROCEDURE — 82306 VITAMIN D 25 HYDROXY: CPT

## 2025-07-10 PROCEDURE — 74018 RADEX ABDOMEN 1 VIEW: CPT

## 2025-07-10 PROCEDURE — 85025 COMPLETE CBC W/AUTO DIFF WBC: CPT

## 2025-07-10 PROCEDURE — 82746 ASSAY OF FOLIC ACID SERUM: CPT

## 2025-07-10 PROCEDURE — 84439 ASSAY OF FREE THYROXINE: CPT

## 2025-07-10 PROCEDURE — 84481 FREE ASSAY (FT-3): CPT

## 2025-07-10 PROCEDURE — 80061 LIPID PANEL: CPT

## 2025-07-11 ENCOUNTER — HOSPITAL ENCOUNTER (EMERGENCY)
Age: 35
Discharge: HOME | End: 2025-07-11
Payer: COMMERCIAL

## 2025-07-11 ENCOUNTER — APPOINTMENT (OUTPATIENT)
Dept: CT IMAGING | Age: 35
End: 2025-07-11
Payer: COMMERCIAL

## 2025-07-11 VITALS
SYSTOLIC BLOOD PRESSURE: 127 MMHG | RESPIRATION RATE: 18 BRPM | OXYGEN SATURATION: 97 % | HEART RATE: 83 BPM | DIASTOLIC BLOOD PRESSURE: 94 MMHG | TEMPERATURE: 98 F

## 2025-07-11 DIAGNOSIS — N20.1 URETEROLITHIASIS: ICD-10-CM

## 2025-07-11 DIAGNOSIS — N20.1 LEFT URETERAL STONE: Primary | ICD-10-CM

## 2025-07-11 LAB
ALBUMIN SERPL-MCNC: 4.3 G/DL (ref 3.5–5.2)
ALBUMIN/GLOB SERPL: 1.5 {RATIO} (ref 1–2.5)
ALP SERPL-CCNC: 100 U/L (ref 35–104)
ALT SERPL-CCNC: 20 U/L (ref 10–35)
ANION GAP SERPL CALCULATED.3IONS-SCNC: 11 MMOL/L (ref 9–16)
AST SERPL-CCNC: 23 U/L (ref 10–35)
BASOPHILS # BLD: 0.04 K/UL (ref 0–0.2)
BASOPHILS NFR BLD: 1 % (ref 0–2)
BILIRUB SERPL-MCNC: 0.3 MG/DL (ref 0–1.2)
BILIRUB UR QL STRIP: ABNORMAL
BUN SERPL-MCNC: 11 MG/DL (ref 6–20)
BUN/CREAT SERPL: 18 (ref 9–20)
CALCIUM SERPL-MCNC: 9.1 MG/DL (ref 8.6–10.4)
CHLORIDE SERPL-SCNC: 104 MMOL/L (ref 98–107)
CLARITY UR: CLEAR
CO2 SERPL-SCNC: 24 MMOL/L (ref 20–31)
COLOR UR: ABNORMAL
CREAT SERPL-MCNC: 0.6 MG/DL (ref 0.5–0.9)
EOSINOPHIL # BLD: 0.09 K/UL (ref 0–0.44)
EOSINOPHILS RELATIVE PERCENT: 1 % (ref 1–4)
EPI CELLS #/AREA URNS HPF: ABNORMAL /HPF (ref 0–25)
ERYTHROCYTE [DISTWIDTH] IN BLOOD BY AUTOMATED COUNT: 12 % (ref 11.8–14.4)
GFR, ESTIMATED: >90 ML/MIN/1.73M2
GLUCOSE SERPL-MCNC: 86 MG/DL (ref 74–99)
GLUCOSE UR STRIP-MCNC: ABNORMAL MG/DL
HCG UR QL: NEGATIVE
HCT VFR BLD AUTO: 41.3 % (ref 36.3–47.1)
HGB BLD-MCNC: 14 G/DL (ref 11.9–15.1)
HGB UR QL STRIP.AUTO: ABNORMAL
IMM GRANULOCYTES # BLD AUTO: <0.03 K/UL (ref 0–0.3)
IMM GRANULOCYTES NFR BLD: 0 %
KETONES UR STRIP-MCNC: NEGATIVE MG/DL
LEUKOCYTE ESTERASE UR QL STRIP: ABNORMAL
LYMPHOCYTES NFR BLD: 2.38 K/UL (ref 1.1–3.7)
LYMPHOCYTES RELATIVE PERCENT: 33 % (ref 24–43)
MCH RBC QN AUTO: 29.2 PG (ref 25.2–33.5)
MCHC RBC AUTO-ENTMCNC: 33.9 G/DL (ref 28.4–34.8)
MCV RBC AUTO: 86 FL (ref 82.6–102.9)
MONOCYTES NFR BLD: 0.39 K/UL (ref 0.1–1.2)
MONOCYTES NFR BLD: 5 % (ref 3–12)
MUCOUS THREADS URNS QL MICRO: ABNORMAL
NEUTROPHILS NFR BLD: 60 % (ref 36–65)
NEUTS SEG NFR BLD: 4.33 K/UL (ref 1.5–8.1)
NITRITE UR QL STRIP: ABNORMAL
NRBC BLD-RTO: 0 PER 100 WBC
PH UR STRIP: ABNORMAL [PH] (ref 5–9)
PLATELET # BLD AUTO: 274 K/UL (ref 138–453)
PMV BLD AUTO: 10.9 FL (ref 8.1–13.5)
POTASSIUM SERPL-SCNC: 3.9 MMOL/L (ref 3.7–5.3)
PROT SERPL-MCNC: 7.2 G/DL (ref 6.6–8.7)
PROT UR STRIP-MCNC: ABNORMAL MG/DL
RBC # BLD AUTO: 4.8 M/UL (ref 3.95–5.11)
RBC #/AREA URNS HPF: ABNORMAL /HPF (ref 0–2)
SODIUM SERPL-SCNC: 139 MMOL/L (ref 136–145)
SP GR UR STRIP: 1.01 (ref 1.01–1.02)
UROBILINOGEN UR STRIP-ACNC: ABNORMAL EU/DL (ref 0–1)
WBC #/AREA URNS HPF: ABNORMAL /HPF (ref 0–5)
WBC OTHER # BLD: 7.2 K/UL (ref 3.5–11.3)

## 2025-07-11 PROCEDURE — 80053 COMPREHEN METABOLIC PANEL: CPT

## 2025-07-11 PROCEDURE — 96374 THER/PROPH/DIAG INJ IV PUSH: CPT

## 2025-07-11 PROCEDURE — 6360000004 HC RX CONTRAST MEDICATION: Performed by: NURSE PRACTITIONER

## 2025-07-11 PROCEDURE — 2580000003 HC RX 258: Performed by: NURSE PRACTITIONER

## 2025-07-11 PROCEDURE — 85025 COMPLETE CBC W/AUTO DIFF WBC: CPT

## 2025-07-11 PROCEDURE — 74177 CT ABD & PELVIS W/CONTRAST: CPT

## 2025-07-11 PROCEDURE — 81025 URINE PREGNANCY TEST: CPT

## 2025-07-11 PROCEDURE — 81001 URINALYSIS AUTO W/SCOPE: CPT

## 2025-07-11 PROCEDURE — 6360000002 HC RX W HCPCS: Performed by: NURSE PRACTITIONER

## 2025-07-11 PROCEDURE — 99285 EMERGENCY DEPT VISIT HI MDM: CPT

## 2025-07-11 RX ORDER — TAMSULOSIN HYDROCHLORIDE 0.4 MG/1
0.4 CAPSULE ORAL DAILY
Qty: 30 CAPSULE | Refills: 0 | Status: SHIPPED | OUTPATIENT
Start: 2025-07-11

## 2025-07-11 RX ORDER — OXYCODONE AND ACETAMINOPHEN 5; 325 MG/1; MG/1
1 TABLET ORAL EVERY 6 HOURS PRN
Qty: 12 TABLET | Refills: 0 | Status: SHIPPED | OUTPATIENT
Start: 2025-07-11 | End: 2025-07-14

## 2025-07-11 RX ORDER — 0.9 % SODIUM CHLORIDE 0.9 %
1000 INTRAVENOUS SOLUTION INTRAVENOUS ONCE
Status: COMPLETED | OUTPATIENT
Start: 2025-07-11 | End: 2025-07-11

## 2025-07-11 RX ORDER — ONDANSETRON 2 MG/ML
4 INJECTION INTRAMUSCULAR; INTRAVENOUS ONCE
Status: COMPLETED | OUTPATIENT
Start: 2025-07-11 | End: 2025-07-11

## 2025-07-11 RX ORDER — ONDANSETRON 4 MG/1
4 TABLET, FILM COATED ORAL 3 TIMES DAILY PRN
Qty: 15 TABLET | Refills: 0 | Status: SHIPPED | OUTPATIENT
Start: 2025-07-11

## 2025-07-11 RX ORDER — IOPAMIDOL 755 MG/ML
75 INJECTION, SOLUTION INTRAVASCULAR
Status: COMPLETED | OUTPATIENT
Start: 2025-07-11 | End: 2025-07-11

## 2025-07-11 RX ADMIN — SODIUM CHLORIDE 1000 ML: 0.9 INJECTION, SOLUTION INTRAVENOUS at 12:47

## 2025-07-11 RX ADMIN — ONDANSETRON 4 MG: 2 INJECTION, SOLUTION INTRAMUSCULAR; INTRAVENOUS at 12:46

## 2025-07-11 RX ADMIN — IOPAMIDOL 75 ML: 755 INJECTION, SOLUTION INTRAVENOUS at 13:40

## 2025-07-11 ASSESSMENT — PAIN DESCRIPTION - LOCATION: LOCATION: PELVIS

## 2025-07-11 ASSESSMENT — PAIN - FUNCTIONAL ASSESSMENT: PAIN_FUNCTIONAL_ASSESSMENT: 0-10

## 2025-07-11 ASSESSMENT — PAIN DESCRIPTION - ORIENTATION: ORIENTATION: LEFT

## 2025-07-11 ASSESSMENT — PAIN DESCRIPTION - PAIN TYPE: TYPE: ACUTE PAIN

## 2025-07-11 ASSESSMENT — PAIN SCALES - GENERAL: PAINLEVEL_OUTOF10: 5

## 2025-07-11 NOTE — ED PROVIDER NOTES
MEDICAL DECISION MAKING:    Patient was seen and evaluated.  She is well-appearing, nontoxic, but uncomfortable.  IV established, urine sent.  Given IV fluid and nausea medicine.  CT will be completed.      REASSESSMENT      Labs and urine reviewed.  CT showed 5 mm stone in the left UVJ.  No hydro-.  I did prescribe Flomax, nausea medicine and pain medicine.  Encouraged to follow-up with urology if you do not pass the stone over the weekend, return for worsening symptoms.  Patient is discharged in good condition.    CRITICAL CARE TIME       CONSULTS:  None    PROCEDURES:  Unless otherwise noted below, none     Procedures      FINAL IMPRESSION      1. Left ureteral stone Stable   2. Ureterolithiasis          DISPOSITION/PLAN   DISPOSITION Decision To Discharge 07/11/2025 03:08:41 PM      PATIENT REFERRED TO:  Lizett Longoria APRN - CNP  [address]  [facility] [address]  [phone]    Call       Rome Lloyd MD  [address], [address]  [facility] [address] [phone]    In 1 day        DISCHARGE MEDICATIONS:  New Prescriptions    ONDANSETRON (ZOFRAN) 4 MG TABLET    Take 1 tablet by mouth 3 times daily as needed for Nausea or Vomiting    OXYCODONE-ACETAMINOPHEN (PERCOCET) 5-325 MG PER TABLET    Take 1 tablet by mouth every 6 hours as needed for Pain for up to 3 days. Intended supply: 3 days. Take lowest dose possible to manage pain Max Daily Amount: 4 tablets    TAMSULOSIN (FLOMAX) 0.4 MG CAPSULE    Take 1 capsule by mouth daily     Controlled Substances Monitoring:     RX Monitoring Acute Pain Prescriptions   3/8/2023  10:47 AM Severe pain not adequately treated with lower dose.       (Please note that portions of this note were completed with a voice recognition program.  Efforts were made to edit the dictations but occasionally words are mis-transcribed.)    Electronically signed by FÁTIMA Johnson CNP on 7/11/2025 at 3:12 PM               Andreea Watson APRN -

## 2025-07-11 NOTE — DISCHARGE INSTRUCTIONS
Flomax as directed, nausea medicine and pain medicine as directed, increase fluid intake, if you do not pass the stone, call your urologist on Monday, return for worsening symptoms

## 2025-07-14 ENCOUNTER — TELEPHONE (OUTPATIENT)
Dept: UROLOGY | Age: 35
End: 2025-07-14

## 2025-07-14 NOTE — TELEPHONE ENCOUNTER
----- Message from SARI KIRKLAND MA sent at 7/14/2025  8:12 AM EDT -----        ----- Message -----  From: Diana Mauricio APRN - CNP  Sent: 7/14/2025   7:46 AM EDT  To: Kathie Mcmullen RN; Catrina Hernandez MA    5mm ureteral stone, apt today  ----- Message -----  From: Discharge Provider, Automatic  Sent: 7/12/2025   3:23 AM EDT  To: Rome Lloyd MD

## 2025-07-14 NOTE — PROGRESS NOTES
Patient got hit hard by a water balloon and she thinks that knocked the stone loose because she passed it.  She saved the stone and will bring it to her appointment on 7/21.

## 2025-07-21 ENCOUNTER — OFFICE VISIT (OUTPATIENT)
Dept: UROLOGY | Age: 35
End: 2025-07-21
Payer: COMMERCIAL

## 2025-07-21 VITALS
BODY MASS INDEX: 31.64 KG/M2 | RESPIRATION RATE: 18 BRPM | SYSTOLIC BLOOD PRESSURE: 128 MMHG | WEIGHT: 178.6 LBS | DIASTOLIC BLOOD PRESSURE: 86 MMHG

## 2025-07-21 DIAGNOSIS — N20.0 RENAL CALCULI: Primary | ICD-10-CM

## 2025-07-21 PROCEDURE — 99214 OFFICE O/P EST MOD 30 MIN: CPT | Performed by: PHYSICIAN ASSISTANT

## 2025-07-21 PROCEDURE — G8427 DOCREV CUR MEDS BY ELIG CLIN: HCPCS | Performed by: PHYSICIAN ASSISTANT

## 2025-07-21 PROCEDURE — G8417 CALC BMI ABV UP PARAM F/U: HCPCS | Performed by: PHYSICIAN ASSISTANT

## 2025-07-21 PROCEDURE — 1036F TOBACCO NON-USER: CPT | Performed by: PHYSICIAN ASSISTANT

## 2025-07-21 NOTE — PROGRESS NOTES
Linda Lennon is a 34 y.o. female in no acute distress. Linda Lennon  is alert and oriented to person, place, and time.      History  8/2020 right ESWL    7/2021 CT abdomen pelvis with IV contrast showed multiple bilateral renal calculi.  Largest stone on the right was approximately 3 mm.  There were two 4 mm stones on the left.  There are multiple punctate stones bilaterally.     3/2025 - right TLL    Today  Patient is here today for ER follow-up.  Patient went to the emergency room on 7/11/2025 with complaints of bladder spasm and dysuria.  She felt as though she was about to pass a kidney stone.  She did have a CT stone protocol done in the emergency room showing a 5 mm left UVJ stone with no associated hydronephrosis.  Patient has additional bilateral renal calculi, largest on the right measuring 3 mm.  Largest on the left measuring approximately 5.5 millimeters.  In the ER white blood cell count and kidney function testing was normal.  She did pass the stone.  She is currently symptom free.    Past Medical History:   Diagnosis Date    Anemia     GERD (gastroesophageal reflux disease)     Kidney stones     Postpartum depression     Pulmonary emboli (HCC)      Past Surgical History:   Procedure Laterality Date    CYSTOSCOPY Right 3/18/2025    CYSTOSCOPY URETEROSCOPY LASER-right ureteroscopy/thulium laser lithotripsy with right ureteral stent placement/exchange performed by Rome Lloyd MD at [facility] OR    EXTERNAL EAR SURGERY Left     LITHOTRIPSY Right 08/25/2020    ESWL EXTRACORPOREAL SHOCK WAVE LITHOTRIPSY     LITHOTRIPSY Right 8/25/2020    ESWL EXTRACORPOREAL SHOCK WAVE LITHOTRIPSY performed by Rome Lloyd MD at [facility] OR    OTHER SURGICAL HISTORY Right 5/26/2016    right cysto HLL with stent exchange    PAIN MANAGEMENT PROCEDURE N/A 8/17/2023    EPIDURAL STEROID INJECTION-LUMBAR L5-S1 INTULAMENAL EPIDURAL STEROID INJECTION performed by Armin Vasquez DO at [facility] OR    PAIN MANAGEMENT

## (undated) DEVICE — MERCY TIFFIN CYSTO-LF: Brand: MEDLINE INDUSTRIES, INC.

## (undated) DEVICE — GOWN,AURORA,NON-REINFORCED,2XL: Brand: MEDLINE

## (undated) DEVICE — GLOVE SURG SZ 8 L12IN FNGR THK87MIL WHT LTX FREE

## (undated) DEVICE — SOLUTION IV IRRIG WATER 1000ML POUR BRL 2F7114

## (undated) DEVICE — MASTISOL ADHESIVE LIQ 2/3ML

## (undated) DEVICE — AVANOS* TUOHY EPIDURAL NEEDLE: Brand: AVANOS

## (undated) DEVICE — FIBER THULIO PERFORMANCE 270 MICRON SLIM

## (undated) DEVICE — CURVED SHARP RF CANNULA, RADIOPAQUE MARKER: Brand: RADIOPAQUE RADIOFREQUENCY CANNULA

## (undated) DEVICE — APPLICATOR MEDICATED 3 CC SOLUTION HI LT ORNG CHLORAPREP 930415

## (undated) DEVICE — SET EXTN TBNG MINIBOR 20IN

## (undated) DEVICE — PAIN TRAY: Brand: MEDLINE INDUSTRIES, INC.

## (undated) DEVICE — GLOVE SURG SZ 8 CRM LTX FREE POLYISOPRENE POLYMER BEAD ANTI

## (undated) DEVICE — STRIP,CLOSURE,WOUND,MEDI-STRIP,1/2X4: Brand: MEDLINE

## (undated) DEVICE — URETEROSCOPE (SEE ITEM COMMENTS) DIGITAL URS FLX SU MODEL E NORM DEFL AXIS II

## (undated) DEVICE — Device: Brand: PORTEX

## (undated) DEVICE — NEEDLE SPNL 25GA L3.5IN BLU HUB S STL REG WALL FIT STYL W/

## (undated) DEVICE — SINGLE ACTION PUMPING SYSTEM

## (undated) DEVICE — PAD GRND FOR RF PAIN MGMT DISP

## (undated) DEVICE — SOLUTION IRRIG 3000ML 0.9% SOD CHL USP UROMATIC PLAS CONT

## (undated) DEVICE — AVANOS* QUINCKE NEEDLE: Brand: AVANOS

## (undated) DEVICE — ADAPTER URO SCP UROLOK LL

## (undated) DEVICE — SHEET,DRAPE,53X77,STERILE: Brand: MEDLINE

## (undated) DEVICE — SOLUTION IRRIG 1000ML 0.9% SOD CHL USP POUR PLAS BTL

## (undated) DEVICE — GUIDEWIRE VASC NITINOL HYDROPHIL STR 3 CM 0.035 INX150 CM STD NIT ZIPWIRE